# Patient Record
Sex: MALE | Race: BLACK OR AFRICAN AMERICAN | NOT HISPANIC OR LATINO | URBAN - METROPOLITAN AREA
[De-identification: names, ages, dates, MRNs, and addresses within clinical notes are randomized per-mention and may not be internally consistent; named-entity substitution may affect disease eponyms.]

---

## 2022-07-16 ENCOUNTER — INPATIENT (INPATIENT)
Facility: HOSPITAL | Age: 57
LOS: 22 days | Discharge: HOME | End: 2022-08-08
Attending: STUDENT IN AN ORGANIZED HEALTH CARE EDUCATION/TRAINING PROGRAM | Admitting: STUDENT IN AN ORGANIZED HEALTH CARE EDUCATION/TRAINING PROGRAM

## 2022-07-16 ENCOUNTER — TRANSCRIPTION ENCOUNTER (OUTPATIENT)
Age: 57
End: 2022-07-16

## 2022-07-16 VITALS
HEART RATE: 84 BPM | SYSTOLIC BLOOD PRESSURE: 137 MMHG | OXYGEN SATURATION: 100 % | HEIGHT: 73 IN | DIASTOLIC BLOOD PRESSURE: 74 MMHG | RESPIRATION RATE: 16 BRPM | TEMPERATURE: 98 F
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
## 2022-07-16 DIAGNOSIS — F20.0 PARANOID SCHIZOPHRENIA: ICD-10-CM

## 2022-07-16 DIAGNOSIS — Z90.89 ACQUIRED ABSENCE OF OTHER ORGANS: Chronic | ICD-10-CM

## 2022-07-16 DIAGNOSIS — Z29.9 ENCOUNTER FOR PROPHYLACTIC MEASURES, UNSPECIFIED: ICD-10-CM

## 2022-07-16 DIAGNOSIS — R74.01 ELEVATION OF LEVELS OF LIVER TRANSAMINASE LEVELS: ICD-10-CM

## 2022-07-16 DIAGNOSIS — S72.002A FRACTURE OF UNSPECIFIED PART OF NECK OF LEFT FEMUR, INITIAL ENCOUNTER FOR CLOSED FRACTURE: ICD-10-CM

## 2022-07-16 DIAGNOSIS — S72.009A FRACTURE OF UNSPECIFIED PART OF NECK OF UNSPECIFIED FEMUR, INITIAL ENCOUNTER FOR CLOSED FRACTURE: ICD-10-CM

## 2022-07-16 DIAGNOSIS — D69.6 THROMBOCYTOPENIA, UNSPECIFIED: ICD-10-CM

## 2022-07-16 LAB
ALBUMIN SERPL ELPH-MCNC: 4.1 G/DL — SIGNIFICANT CHANGE UP (ref 3.3–5)
ALP SERPL-CCNC: 47 U/L — SIGNIFICANT CHANGE UP (ref 40–120)
ALT FLD-CCNC: 64 U/L — HIGH (ref 4–41)
ANION GAP SERPL CALC-SCNC: 11 MMOL/L — SIGNIFICANT CHANGE UP (ref 7–14)
APTT BLD: 23.3 SEC — LOW (ref 27–36.3)
AST SERPL-CCNC: 112 U/L — HIGH (ref 4–40)
BILIRUB SERPL-MCNC: 1 MG/DL — SIGNIFICANT CHANGE UP (ref 0.2–1.2)
BLD GP AB SCN SERPL QL: NEGATIVE — SIGNIFICANT CHANGE UP
BUN SERPL-MCNC: 34 MG/DL — HIGH (ref 7–23)
CALCIUM SERPL-MCNC: 8.7 MG/DL — SIGNIFICANT CHANGE UP (ref 8.4–10.5)
CHLORIDE SERPL-SCNC: 104 MMOL/L — SIGNIFICANT CHANGE UP (ref 98–107)
CO2 SERPL-SCNC: 22 MMOL/L — SIGNIFICANT CHANGE UP (ref 22–31)
CREAT SERPL-MCNC: 0.93 MG/DL — SIGNIFICANT CHANGE UP (ref 0.5–1.3)
EGFR: 96 ML/MIN/1.73M2 — SIGNIFICANT CHANGE UP
FLUAV AG NPH QL: SIGNIFICANT CHANGE UP
FLUBV AG NPH QL: SIGNIFICANT CHANGE UP
GLUCOSE SERPL-MCNC: 117 MG/DL — HIGH (ref 70–99)
HCT VFR BLD CALC: 32.1 % — LOW (ref 39–50)
HGB BLD-MCNC: 10.3 G/DL — LOW (ref 13–17)
INR BLD: 1.17 RATIO — HIGH (ref 0.88–1.16)
MCHC RBC-ENTMCNC: 26.1 PG — LOW (ref 27–34)
MCHC RBC-ENTMCNC: 32.1 GM/DL — SIGNIFICANT CHANGE UP (ref 32–36)
MCV RBC AUTO: 81.3 FL — SIGNIFICANT CHANGE UP (ref 80–100)
NRBC # BLD: 0 /100 WBCS — SIGNIFICANT CHANGE UP
NRBC # FLD: 0 K/UL — SIGNIFICANT CHANGE UP
PLATELET # BLD AUTO: 105 K/UL — LOW (ref 150–400)
POTASSIUM SERPL-MCNC: 3.9 MMOL/L — SIGNIFICANT CHANGE UP (ref 3.5–5.3)
POTASSIUM SERPL-SCNC: 3.9 MMOL/L — SIGNIFICANT CHANGE UP (ref 3.5–5.3)
PROT SERPL-MCNC: 6.6 G/DL — SIGNIFICANT CHANGE UP (ref 6–8.3)
PROTHROM AB SERPL-ACNC: 13.6 SEC — HIGH (ref 10.5–13.4)
RBC # BLD: 3.95 M/UL — LOW (ref 4.2–5.8)
RBC # FLD: 14.2 % — SIGNIFICANT CHANGE UP (ref 10.3–14.5)
RH IG SCN BLD-IMP: POSITIVE — SIGNIFICANT CHANGE UP
RSV RNA NPH QL NAA+NON-PROBE: SIGNIFICANT CHANGE UP
SARS-COV-2 RNA SPEC QL NAA+PROBE: SIGNIFICANT CHANGE UP
SODIUM SERPL-SCNC: 137 MMOL/L — SIGNIFICANT CHANGE UP (ref 135–145)
WBC # BLD: 11.61 K/UL — HIGH (ref 3.8–10.5)
WBC # FLD AUTO: 11.61 K/UL — HIGH (ref 3.8–10.5)

## 2022-07-16 PROCEDURE — 71045 X-RAY EXAM CHEST 1 VIEW: CPT | Mod: 26

## 2022-07-16 PROCEDURE — 73560 X-RAY EXAM OF KNEE 1 OR 2: CPT | Mod: 26,50

## 2022-07-16 PROCEDURE — 99285 EMERGENCY DEPT VISIT HI MDM: CPT

## 2022-07-16 PROCEDURE — 99223 1ST HOSP IP/OBS HIGH 75: CPT

## 2022-07-16 PROCEDURE — 73552 X-RAY EXAM OF FEMUR 2/>: CPT | Mod: 26,50

## 2022-07-16 PROCEDURE — 73521 X-RAY EXAM HIPS BI 2 VIEWS: CPT | Mod: 26

## 2022-07-16 PROCEDURE — 99222 1ST HOSP IP/OBS MODERATE 55: CPT | Mod: 57

## 2022-07-16 DEVICE — K-WIRE STRYKER 3.2MM X 450MM: Type: IMPLANTABLE DEVICE | Site: LEFT | Status: FUNCTIONAL

## 2022-07-16 DEVICE — STRYKER TROCHANTERIC NAIL 11MM X 180MM 125 DEGREE: Type: IMPLANTABLE DEVICE | Site: LEFT | Status: FUNCTIONAL

## 2022-07-16 DEVICE — SCREW LOCK FULLY THREADED 5X37.5MM: Type: IMPLANTABLE DEVICE | Site: LEFT | Status: FUNCTIONAL

## 2022-07-16 DEVICE — SCREW LAG GAMMA 10.5X105MM: Type: IMPLANTABLE DEVICE | Site: LEFT | Status: FUNCTIONAL

## 2022-07-16 RX ORDER — MORPHINE SULFATE 50 MG/1
4 CAPSULE, EXTENDED RELEASE ORAL ONCE
Refills: 0 | Status: DISCONTINUED | OUTPATIENT
Start: 2022-07-16 | End: 2022-07-16

## 2022-07-16 RX ORDER — MORPHINE SULFATE 50 MG/1
4 CAPSULE, EXTENDED RELEASE ORAL EVERY 4 HOURS
Refills: 0 | Status: DISCONTINUED | OUTPATIENT
Start: 2022-07-16 | End: 2022-07-17

## 2022-07-16 RX ORDER — ACETAMINOPHEN 500 MG
650 TABLET ORAL EVERY 6 HOURS
Refills: 0 | Status: DISCONTINUED | OUTPATIENT
Start: 2022-07-16 | End: 2022-07-17

## 2022-07-16 RX ORDER — ACETAMINOPHEN 500 MG
975 TABLET ORAL ONCE
Refills: 0 | Status: COMPLETED | OUTPATIENT
Start: 2022-07-16 | End: 2022-07-16

## 2022-07-16 RX ORDER — OXYCODONE AND ACETAMINOPHEN 5; 325 MG/1; MG/1
1 TABLET ORAL EVERY 6 HOURS
Refills: 0 | Status: DISCONTINUED | OUTPATIENT
Start: 2022-07-16 | End: 2022-07-17

## 2022-07-16 RX ORDER — SENNA PLUS 8.6 MG/1
2 TABLET ORAL AT BEDTIME
Refills: 0 | Status: DISCONTINUED | OUTPATIENT
Start: 2022-07-16 | End: 2022-08-08

## 2022-07-16 RX ORDER — LANOLIN ALCOHOL/MO/W.PET/CERES
3 CREAM (GRAM) TOPICAL AT BEDTIME
Refills: 0 | Status: DISCONTINUED | OUTPATIENT
Start: 2022-07-16 | End: 2022-08-08

## 2022-07-16 RX ORDER — ONDANSETRON 8 MG/1
4 TABLET, FILM COATED ORAL EVERY 8 HOURS
Refills: 0 | Status: DISCONTINUED | OUTPATIENT
Start: 2022-07-16 | End: 2022-08-08

## 2022-07-16 RX ADMIN — Medication 650 MILLIGRAM(S): at 21:55

## 2022-07-16 RX ADMIN — Medication 975 MILLIGRAM(S): at 09:52

## 2022-07-16 RX ADMIN — MORPHINE SULFATE 4 MILLIGRAM(S): 50 CAPSULE, EXTENDED RELEASE ORAL at 05:11

## 2022-07-16 RX ADMIN — Medication 650 MILLIGRAM(S): at 21:27

## 2022-07-16 RX ADMIN — SENNA PLUS 2 TABLET(S): 8.6 TABLET ORAL at 23:03

## 2022-07-16 RX ADMIN — MORPHINE SULFATE 4 MILLIGRAM(S): 50 CAPSULE, EXTENDED RELEASE ORAL at 19:00

## 2022-07-16 RX ADMIN — MORPHINE SULFATE 4 MILLIGRAM(S): 50 CAPSULE, EXTENDED RELEASE ORAL at 18:40

## 2022-07-16 NOTE — BH CONSULTATION LIAISON ASSESSMENT NOTE - SUMMARY
Patient is a 55 y/o M w PSH of tonsillectomy, no prevenlant PMH, PPHx of schizophrenia, presents with acute left hip fracture. Patient has a hx of inpatient psychiatric admissions, as per chart review patient with multiple inpatient admission, patient has 2 admission to [facility] in 2008 and 2015. It is noted that patient has a hx of being on haldol 5mg TID and cogentin, however currently on no medications. Patient has no known SA, no hx of violence noted in chart review, no reported substance abuse. Patient not in current psychiatric treatment. Psychiatry called for capacity as patient is refusing recommended surgical repair of hip fracture.     PLAN  - at this time patient lacks capacity to refuse surgical intervention. would recommend family involvement in decision making.   - patient refusing standing medication  -- currently calm, behavior in control, chronic delusions  - antipsychotics can only be given if qtc < 500   - PRN for agitation, haldol 5mg q6hrs if qtc < 500  - no SI or HI. defer observational status to primary team - determine physical abilities and if at risk for elopement

## 2022-07-16 NOTE — PATIENT PROFILE ADULT - FALL HARM RISK - HARM RISK INTERVENTIONS

## 2022-07-16 NOTE — CONSULT NOTE ADULT - SUBJECTIVE AND OBJECTIVE BOX
Skip Golden MD  , Orthopaedic Spine Surgery  [facility]  Mercy Saucedo PA-C  Office: [phone]  Fax: [phone]    Spinal Fusion   Preoperative, Postoperative and Home Recovery Instructions   Introduction  The purpose of this guide is to provide you and your family with information regarding your medical condition and planned surgery. This information is part of your medical “Informed Consent”. Please read it  and follow the advice carefully. You should retain the guide for future reference and bring it with you to office appointments and to the hospital for reference.  Family Waiting  After surgery, Dr. Golden will meet with your family in the surgical waiting room unless otherwise arranged. Please have a representative available in the waiting room to gather the family upon completion of surgery.  After Your Operation  Pain  After surgery you will experience pain in the region of the incision. Some leg pain as well as tingling or numbness may also be present. Initially it may be of greater intensity than pre-operatively but will subside over time as the healing process occurs. This discomfort is caused from surgical retraction of tissue as well as inflammation and swelling of the previously compressed nerves. This may occur for several weeks and is NORMAL. Persistent pain should be reported to your physician.  Some patients experience a sore throat and swallowing difficulty after general anesthesia and surgery. This is from manipulation of tissue and the presence of the breathing tube for anesthesia. The sore throat usually will subside within a week. The swallowing difficulty usually takes longer. Using throat lozenges or lemon drops, sipping cool liquids, or sucking ice chips may soothe this pain.  Use of Pain Medication  Narcotic pain medication will be available for pain relief after surgery. Narcotics are very effective for pain relief  but may cause other side effects. The possible effects vary among patients and may include: sleepiness, nausea, constipation, flushing, sweating, and occasionally euphoria or confused feelings are possible. If these occur notify your nurse.   You may have a Patient Controlled Analgesia (PCA) pump. This is preferred by some but not all patients. When you depress a switch the pump will deliver narcotic pain medication directly into your IV without requiring repeated intramuscular injections. The PCA pump is closely monitored by the nursing and  anesthesiology staff. If you feel you may benefit from this method of narcotic medication delivery, ask your nurse for a PCA pump trial.  Activity  Feel free to move about in your bed. IF YOU ARE GIVEN A BRACE, then the head of your bed is elevated above 60 degrees, or you are out of bed, your TLSO brace must be on. The nurse or therapist will assist you in getting out of bed a few hours after surgery. You will be instructed to be up walking every 2 to 3 hours during the day and evening. The nurse will allow you to do this independently once you are steady and feel comfortable.  Early activity after surgery is extremely important to help prevent the complications of prolonged bed rest such as pneumonia and blood clots. It also promotes recovery, relieves muscle stiffness, allows for development of a well-organized scar, and improves your outlook.  Do not start any programs of exercise or physical therapy unless discussed with your doctor.  Diet  Your diet will begin with clear liquids, and be advanced to your normal daily diet as soon as your condition permits. Your IV will be removed as soon as we are reasonably certain it will no longer be required for medications and hydration.  Bowel and Bladder Function  During surgery you may have a catheter (tube) in your bladder to monitor your urine output. Upon its removal you may feel a stinging sensation for 2 to 3 days, which is  normal. Some patients may have difficulty urinating after surgery. If this occurs, notify your nurse who may assist you in voiding  techniques. This may require placing a catheter in your bladder.  After surgery, constipation frequently occurs from inactivity and the side effects of pain medication. Stool softeners and laxatives will be available from your nurse.  Respiratory Hygiene  Deep breathing is very important after surgery to maintain lung expansion and reduce the risk of pneumonia. You will be provided with an incentive spirometer and instructed about its use. This device should be used every 15 to 30 minutes during your wakeful hours initially, then every 1 to 2 hours as your activity returns to normal. This device is yours to take home. Continue to use it at home for at least 1 week after your discharge. (Use it during TV commercial breaks).  Smoking is absolutely forbidden. There is clear evidence that smoking dramatically increases your risk of post-operative complications. There is also evidence that smoking adversely effects bone healing and nerve recovery. Second hand smoke also applies.  Use of Your Brace  A brace may be provided prior to surgery. It serves as a reminder and keeps your back supported. It also reduces discomfort and facilitates healing. The brace should fit snugly, yet comfortably. It should allow only minimal motion. Do not “fight” the brace; cooperate with it. This will assist in bone healing and minimize discomfort and skin irritation.  Home Recovery  Follow-Up Appointment  Patients are generally discharged from the hospital 1 to 2 days after surgery. A follow-up appointment was made for Dr Golden's office 2 weeks from the date of surgery. At your first follow-up visit, you will be evaluated and the incision will be checked. You will then be seen at 6 weeks, 3 months, 6 months, 1 year and 2 years from surgery. X-rays will be taken at each visit to insure appropriate healing is  taking place.   Incision Care and Hygiene  After the 3rd post-operative day, you are encouraged to shower daily. Do not soak your incision. Pat the incision dry. Do not apply any ointments or creams. Cover daily, for the first 5 days, with a clean dressing. A supply will be provided upon discharge. Surgical tapes or Steri-strips may be present to aid in holding the skin edges together. Allow these to fall off on their own. After five days post-operatively you no longer need to wear a bandage. NO BATHS, HOT TUBS, OR POOLS FOR 6 WEEKS AFTER SURGERY, it will increase your risk of infection.   Inflammation  Please take your temperature every afternoon for the first week after you are discharged from the hospital. Call your physician if:  1. Your temperature, taken by thermometer, is more than 101.5 degrees,  OR  2. Your incision becomes reddened, swollen or any increase or change in drainage occurs.  Nutrition  A well-balanced diet is necessary for good healing and recovery. This includes food from the four basic food groups: dairy products, meat, vegetables and fruit. Use of narcotic pain medication and prolonged rest may cause constipation. Drinking plenty of fluids and eating high fiber foods (whole grains, raw fruits and vegetables) will help regain normal bowel function.  Home Pain and Medication  When we surgically relieve pressure from an inflamed, damaged nerve it does not recover instantaneously. The surgical procedure does not heal the nerve, only the body is capable of that. The goal of surgery is to create the best possible environment for the body to heal itself and to prevent further damage. This will take a variable length of time depending on the duration and degree of nerve damage and the body’s own healing abilities. Most of the healing occurs in the first few months. Pain, weakness, or numbness lasting more than six months will likely be permanent.  Everyone has a different pain tolerance that will  56yMale c/o L hip pain s/p mechanical fall 2 days ago while gardening. Patient denies head hit or LOC. Patient denies numbness or tingling in the LLE. Has a superficial abrasion over right knee. Patient denies any other injuries. Reports he wanted to stay home but called EMS due to the pain and only wanted them to provide pain meds, he did not want to come to the hospital. He is a community ambulator and denies any medical history but has a documented medical history of schizophrenia and bipolar disorder. Can only provide the phone number of his mother [phone] but there is no answer on repeated calls and messages left.     ROS: 10 point review of systems otherwise negative unless noted in HPI    PMH:  Paranoid schizophrenia    Bipolar 1 disorder      PSH:  No significant past surgical history      AH:    Meds: See med rec    T(C): 37 (07-16-22 @ 06:17)  HR: 83 (07-16-22 @ 06:17)  BP: 131/66 (07-16-22 @ 06:17)  RR: 16 (07-16-22 @ 06:17)  SpO2: 100% (07-16-22 @ 06:17)  Wt(kg): --                        10.3   11.61 )-----------( 105      ( 16 Jul 2022 05:15 )             32.1     07-16    137  |  104  |  34<H>  ----------------------------<  117<H>  3.9   |  22  |  0.93    Ca    8.7      16 Jul 2022 05:15    TPro  6.6  /  Alb  4.1  /  TBili  1.0  /  DBili  x   /  AST  112<H>  /  ALT  64<H>  /  AlkPhos  47  07-16    PT/INR - ( 16 Jul 2022 05:15 )   PT: 13.6 sec;   INR: 1.17 ratio         PTT - ( 16 Jul 2022 05:15 )  PTT:23.3 sec      PE  Gen: NAD, alert and oriented  Resp: Unlabored breathing  LLE: Skin intact, no ecchymosis,        SILT DP/SP/ Inna/Saph,        +EHL/FHL/TA/Gastroc,        Knee/ankle painless ROM,        hip ROM limited 2/2 pain,       DP+,        soft compartments, no calf ttp,        +log roll.      Secondary:  No TTP over bony landmarks, SILT BL, ROM intact BL, distal pulses palpable.  + superficial abrasion over right knee, no erythema, no discharge, no pain with ROM      Imaging:  XR demonstrating L hip IT fracture dictate the amount of pain medication required. A decreased dose and less frequent use of pain medication will occur during your recovery period. A gradual weaning of medications should begin as soon as possible, generally within 2 to 4 weeks. Conservative use of narcotic pain medication is advised. While using narcotic pain medication you SHOULD NOT drive. One should try non-narcotic medication, such as Tylenol and reserve narcotics for only the difficult times. Do not take anti-inflammatory medicines such as Celebrex, Ibuprofen, Meloxicam, Advil, Aleve, or Motrin, as these may affect your bone healing for 12 weeks following your surgery.   Narcotics will NOT be considered for refills at night or over the weekend, or holiday.  Home Activity  A well-balanced diet is necessary for good healing and recovery. This includes food from the four basic food groups: diary products, meat, vegetables and fruit.  The First Week  Early to bed, late to rise and frequent rest periods throughout the day. Get at least 8 hours of sleep each night. A disrupted sleep pattern is common after discharge from the hospital and will return to normal over time.   You may not drive, but you may be driven, for short distances, using proper restraints such as shoulder and lap belts FOR 4 WEEKS   No lifting of more than 10 pounds   May climb stairs with hand rail   Avoid sitting for longer than 20 minutes at a time   Begin a daily walking program with 1 to 2 blocks initially; schedule a daily time and increase distance daily.   Eat a regular, balanced diet.   Take medications as prescribed, using narcotics as needed. Avoid using NSAIDs such as Motrin or Advil. FOR 12 WEEKS.   The Second Week  Resume normal rising and retiring schedule, but continue to rest throughout the day.   Wear your brace as instructed.   You may not drive.   No lifting of anything weighing more than 10 pounds.   May climb stairs with hand rail   Continue scheduled walking,  increasing distance and frequency as able.   May resume sexual relations when comfortable.   Begin narcotic weaning as pain diminishes, relying mainly on non-narcotic medications  The Third Week  Resume normal rising and retiring schedule, resting as needed.   May resume light work around the home; lifting not to exceed 10 pounds.   Continue scheduled walking.   Wear your brace as instructed.  The Fourth Week  Resume normal rising and retiring schedule, resting as needed.   May resume light work around the home; lifting not to exceed 10 pounds.   Continue scheduled walking.   Wear your brace as instructed.   Disability  Some patients may return to work sooner than others depending on their job, response to surgery, and ability to perform other lighter tasks in the work place. Physician approval is required  prior to returning to work.  If your employer requires documentation of your work status, our office will provide the necessary information to your employer or other concerned parties. All disability matters may be handled by contacting our office.

## 2022-07-16 NOTE — BH CONSULTATION LIAISON ASSESSMENT NOTE - NSBHCHARTREVIEWLAB_PSY_A_CORE FT
10.3   11.61 )-----------( 105      ( 16 Jul 2022 05:15 )             32.1   07-16    137  |  104  |  34<H>  ----------------------------<  117<H>  3.9   |  22  |  0.93    Ca    8.7      16 Jul 2022 05:15    TPro  6.6  /  Alb  4.1  /  TBili  1.0  /  DBili  x   /  AST  112<H>  /  ALT  64<H>  /  AlkPhos  47  07-16

## 2022-07-16 NOTE — H&P ADULT - ASSESSMENT
55 y/o M w PMH of paranoid schizophrenia (with multiple hospitalizations) presents with acute left hip fracture. Pt refusing surgical intervention but appears to have poor insight into condition. Admitted to medicine for further monitoring

## 2022-07-16 NOTE — BH CONSULTATION LIAISON ASSESSMENT NOTE - ATTENDING COMMENTS
Chart reviewed. Pt seen virtually with ACP. On exam he is pleasant, oriented, and cooperative. However grossly psychotic, delusional, thought disordered. Has poor insight into context and lacks capacity to refuse care at this time due to uncontrolled psychosis. Agree w/ above assessment/recs. Will continue to follow.

## 2022-07-16 NOTE — CONSULT NOTE ADULT - ATTENDING COMMENTS
Patient deemed unable to make medical decisions by psychiatry.  Mother has agreed to surgery and signed consent via phone and witness.   For L hip IM nail tomorrow.  Patient seen and discussed with resident.  Films reviewed.  Agree with assessment and plan.
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
## 2022-07-16 NOTE — BH CONSULTATION LIAISON ASSESSMENT NOTE - RISK ASSESSMENT
Risk: male gender; hx of med noncompliance;   Protective factors: no history of suicide attempts; no substance use hx; lives in supervised setting (mother)

## 2022-07-16 NOTE — ED PROVIDER NOTE - ATTENDING CONTRIBUTION TO CARE
agree with resident note    "55 y/o M w PMH of schizophrenia presents with leg pain since Wednesday. Pt was gardening on Wednesday and fell while pulling on a vine.  He fell on his left side and has since been unable to ambulate on left leg. In the same accident, pt also scraped his knee. The pain continued to progress, so he called EMS. In the fall, pt did not hit his head."    PE: well appearing; in no distress; VSS: CTAB/L; s1 s2 no m/r/g abd soft/NT/ND pelvis: stable ext: left leg shortened and externally rotated and pt endorses pain prox femur/hip    Imp: hip/prox femur fracture per exam; pre op labs, xray, ortho consult    ortho saw pt and pt is refusing surgery; attempted to reach mother but could not; pt is not demonstrating capacity in terms of insight in not getting surgery; will sign out needs to be admitted either to medicine to reach family/psych involvement or ortho if he agrees to surgery

## 2022-07-16 NOTE — H&P ADULT - PROBLEM SELECTOR PLAN 3
-as per history, likely mechanical fall  -denies preceding symptoms  -EKG wnl  -PT eval once acute ortho issues intervened on

## 2022-07-16 NOTE — ED ADULT NURSE NOTE - OBJECTIVE STATEMENT
57 y/o male presents to ED with c/o LUE hip s/p fall 2 days ago. Pt was doing yard work prior to falling. He denies being on blood thinners, head strike and LOC. He presents to ED with his LLE externally rotated and swelling noted to his left knee. Skin tear to the right knee. Pt reports 9/10 left hip pain and numbness to the left hip. + LLE sensation, + left pedal pulse, skin warm, dry and normal color. Pt denies fever, chills, SOB, HA, dizziness, lightheadedness. He is A&OX3, spontaneous respirations and equal chest rise. VSS on RA. Bed locked and in lowest position, side rails up and call bell within reach.

## 2022-07-16 NOTE — H&P ADULT - NSHPREVIEWOFSYSTEMS_GEN_ALL_CORE
CONSTITUTIONAL:  No weight loss, fever, chills, weakness or fatigue.  HEENT:  Eyes:  No visual loss, blurred vision, double vision or yellow sclerae. Ears, Nose, Throat:  No hearing loss, sneezing, congestion, runny nose or sore throat.  SKIN:  No rash or itching.  CARDIOVASCULAR:  No chest pain, chest pressure or chest discomfort. No palpitations or edema.  RESPIRATORY:  No shortness of breath, cough or sputum.  GASTROINTESTINAL:  No anorexia, nausea, vomiting or diarrhea. No abdominal pain or blood.  GENITOURINARY:  Denies hematuria, dysuria.   NEUROLOGICAL:  No headache, dizziness, syncope, paralysis, ataxia, numbness or tingling in the extremities. No change in bowel or bladder control.  MUSCULOSKELETAL: +hip pain No muscle, back pain.  PSYCHIATRIC:  No history of depression or anxiety.  ENDOCRINOLOGIC:  No reports of sweating, cold or heat intolerance. No polyuria or polydipsia.  ALLERGIES:  No history of asthma, hives, eczema or rhinitis.

## 2022-07-16 NOTE — H&P ADULT - NSHPPHYSICALEXAM_GEN_ALL_CORE
GENERAL APPEARANCE: Well developed, well nourished, alert and cooperative. NAD.   HEENT:  PERRL, EOMI. External auditory canals normal, hearing grossly intact.  NECK: Neck supple, non-tender without lymphadenopathy, masses or thyromegaly.  CARDIAC: Normal S1 and S2. No S3, S4 or murmurs. Rhythm is regular.  LUNGS: Clear to auscultation and percussion without rales, rhonchi, wheezing or diminished breath sounds.  ABDOMEN: Positive bowel sounds. Soft, nondistended, nontender. No guarding or rebound.   MUSCULOSKELETAL: Limited ROM of left hip, pain on palpation   EXTREMITIES: Left leg shortened and externally rotated. No edema. Peripheral pulses intact. No varicosities.  NEUROLOGICAL: CN II-XII intact. Strength and sensation symmetric and intact throughout.   SKIN: abrasion on right knee with ace wrap   PSYCHIATRIC: AOx3. Oddly related. Circumferential and tangential.
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
## 2022-07-16 NOTE — ED PROVIDER NOTE - CLINICAL SUMMARY MEDICAL DECISION MAKING FREE TEXT BOX
57 y/o M w PMH of schizophrenia presents with inability to bear weight on left leg since fall Wednesday. Physical exam findings of shortened externally rotated leg suggest hip fracture. Xrays confirm fracture. Orthopedics consulted. Pain management, pre-op labs, COVID Swab. Pt will need admission

## 2022-07-16 NOTE — BH CONSULTATION LIAISON ASSESSMENT NOTE - CURRENT MEDICATION
MEDICATIONS  (STANDING):  senna 2 Tablet(s) Oral at bedtime    MEDICATIONS  (PRN):  acetaminophen     Tablet .. 650 milliGRAM(s) Oral every 6 hours PRN Temp greater or equal to 38C (100.4F), Mild Pain (1 - 3)  aluminum hydroxide/magnesium hydroxide/simethicone Suspension 30 milliLiter(s) Oral every 4 hours PRN Dyspepsia  melatonin 3 milliGRAM(s) Oral at bedtime PRN Insomnia  morphine  - Injectable 4 milliGRAM(s) IV Push every 4 hours PRN Severe Pain (7 - 10)  ondansetron Injectable 4 milliGRAM(s) IV Push every 8 hours PRN Nausea and/or Vomiting  oxycodone    5 mG/acetaminophen 325 mG 1 Tablet(s) Oral every 6 hours PRN Moderate Pain (4 - 6)

## 2022-07-16 NOTE — ED ADULT TRIAGE NOTE - CHIEF COMPLAINT QUOTE
Pt c/o left knee and left upper leg pain since wed. pt states he fell wed while working in the back yard. pt left knee noted to be very swollen. Pt was found  by ems on the floor where he was laying for approx 6 hrs. Pt right knee noted to scrapped. Pt denies hitting his head, LOC or any blood thinner use. No complaints of chest pain, headache, nausea, dizziness, vomiting  SOB, fever, chills verbalized..

## 2022-07-16 NOTE — ED PROVIDER NOTE - PHYSICAL EXAMINATION
Vital Signs Last 24 Hrs  T(C): 36.8 (16 Jul 2022 02:21), Max: 36.8 (16 Jul 2022 02:21)  T(F): 98.2 (16 Jul 2022 02:21), Max: 98.2 (16 Jul 2022 02:21)  HR: 84 (16 Jul 2022 02:21) (84 - 84)  BP: 137/74 (16 Jul 2022 02:21) (137/74 - 137/74)  BP(mean): --  RR: 16 (16 Jul 2022 02:21) (16 - 16)  SpO2: 100% (16 Jul 2022 02:21) (100% - 100%)    Parameters below as of 16 Jul 2022 02:21  Patient On (Oxygen Delivery Method): room air    General: Pt in mild distress due to pain, looks appropriate to age  Cards: S1, S2, no M/R/G  Resp: CTA b/l  Abd: soft, non-tender, non-distended, no masses  Ext: Right knee with abrasion, dry, no purulence. Otherwise pulses present, no edema, no cyanosis.   MSK: Left hip externally rotated, significant tendernes to palpation over the lateral thigh. Unable to evaluate strength/ROM due to pain  Neuro: AOx3, no focal neurological deficits   Psych: good mood, appropriate affect

## 2022-07-16 NOTE — H&P ADULT - NSICDXFAMILYHX_GEN_ALL_CORE_FT
FAMILY HISTORY:  Mother  Still living? Unknown  FH: deafness or hearing loss, Age at diagnosis: Age Unknown

## 2022-07-16 NOTE — ED ADULT NURSE REASSESSMENT NOTE - NS ED NURSE REASSESS COMMENT FT1
Received report from night shift RN Yenifer. Pt A&OX4, resting comfortably in stretcher. Ortho at bedside. Pt refusing surgery at this time. Verbalizes understanding of risks/benefits. Appears comfortable. R knee wrapped by ortho. Awaiting dispo.

## 2022-07-16 NOTE — ED PROVIDER NOTE - NS ED ROS FT
General: No fever, chills, weight loss  Cards: No CP, palpitations  Resp: No cough, SOB, CORONADO  GI: No N/V/C/D, abdominal pain, melena, hematochezia, hematemesis, melena  Ext: + left hip pain, + right knee abrasion, no swelling  : no dysuria, hematuria   Neuro: A&O x3, no focal neurological deficits   Psych: no depression, anxiety

## 2022-07-16 NOTE — H&P ADULT - PROBLEM SELECTOR PLAN 2
-appears decompensated. Not on medications  -currently calm and re-directable  -f/u BH recs  -haldol prn for acute agitation

## 2022-07-16 NOTE — H&P ADULT - PROBLEM SELECTOR PLAN 5
-no prior labs for comparison  -alcohol use? Pt denies but poor historian  -check acute viral panel  -if persistently elevated, check abdominal US to evaluate

## 2022-07-16 NOTE — H&P ADULT - NSHPLABSRESULTS_GEN_ALL_CORE
(07-16 @ 05:15)                      10.3  11.61 )-----------( 105                 32.1    Neutrophils = -- (--%)  Lymphocytes = -- (--%)  Eosinophils = -- (--%)  Basophils = -- (--%)  Monocytes = -- (--%)  Bands = --%    07-16    137  |  104  |  34<H>  ----------------------------<  117<H>  3.9   |  22  |  0.93    Ca    8.7      16 Jul 2022 05:15    TPro  6.6  /  Alb  4.1  /  TBili  1.0  /  DBili  x   /  AST  112<H>  /  ALT  64<H>  /  AlkPhos  47  07-16    ( 16 Jul 2022 05:15 )   PT: 13.6 sec;   INR: 1.17 ratio;       PTT:23.3 sec    < from: Xray Chest 1 View AP/PA (07.16.22 @ 05:07) >    IMPRESSION:    No focal consolidation.    < end of copied text >    < from: Xray Femur 2 Views, Bilat (07.16.22 @ 05:07) >      IMPRESSION:  Acute comminuted intertrochanteric fracture of the LEFT proximal femur.    < end of copied text >        Labs and imaging reviewed  EKG: NSR, no acute ST changes. QTc 438

## 2022-07-16 NOTE — ED ADULT NURSE NOTE - NSIMPLEMENTINTERV_GEN_ALL_ED
Implemented All Fall Risk Interventions:  [place] to call system. Call bell, personal items and telephone within reach. Instruct patient to call for assistance. Room bathroom lighting operational. Non-slip footwear when patient is off stretcher. Physically safe environment: no spills, clutter or unnecessary equipment. Stretcher in lowest position, wheels locked, appropriate side rails in place. Provide visual cue, wrist band, yellow gown, etc. Monitor gait and stability. Monitor for mental status changes and reorient to person, place, and time. Review medications for side effects contributing to fall risk. Reinforce activity limits and safety measures with patient and family.

## 2022-07-16 NOTE — BH CONSULTATION LIAISON ASSESSMENT NOTE - NSBHMSEBEHAV_PSY_A_CORE
MANTOUX TUBERCULIN (a.k.a. TB) SKIN TEST      What is Tuberculosis/TB?  Tuberculosis/TB is an infectious disease, which is spread through the air by tiny germs when people cough, sneeze, speak or sing. TB germs are very small and can remain in the air for a long period of time. TB germs most oft  en settle in your lungs, but may also settle in other organs of your body. TB germs can be present in your body without making you ill. This is called TB INFECTION. TB infection cannot be spread to other people. When your  body’s defenses become weak and can no longer control the TB germs they multiply, this is called TB DISEASE. It can take month(s) to year(s) for TB infection to become TB disease. The TB SKIN TEST can show if a person has  been “infected” by TB germs.    How is the Mantoux Tuberculin/TB skin test given?  A very small amount of a product called Purified Protein Derivative (PPD) is injected just under the top layer of the skin on the forearm. Persons who have been infected by the TB germs usually react to the test by developing swelling at the injection site. The skin test results must be read 48 to 72 hours after given. Failure to return within this time frame means the test will need to be repeated.    Side effects…  Side effects from a skin test are rare and may include itching and discomfort at the injection site and very rarely the possibility of a blister, ulceration or necrosis (dead tissue) at the site if the injection.  
Cooperative

## 2022-07-16 NOTE — H&P ADULT - HISTORY OF PRESENT ILLNESS
57 y/o M w PMH of paranoid schizophrenia (with multiple hospitalizations) presents with leg pain since Wednesday. Pt was gardening on Wednesday and fell while pulling on a vine.  He fell on his left side and has since been unable to ambulate on left leg. In the same accident, pt also scraped his right knee. The pain continued to progress, so he called EMS. In the fall, pt did not hit his head. Denies LOC. Prior to incident, he was able to climb stairs and perform ADLs without chest pain or SOB. Denies cardiac history. Denies A/V hallucinations. Denies SI/HI.   Extensive discussion with pt regarding hip fracture seen on xray and need for emergent surgery. He did not believe he had a fracture and instead stated "It is a tendon strain, I’ve had this before. I’ll wait two weeks and get a second opinion before pursing surgery." He was not able to repeat back to me the risks involved with delaying surgery. On exam he was circumferential and tangential, and with possibly paranoid thinking. When asked if I could reach out to his mother (next of kin), he gave me permission but stated "I’m in an active case against her for all she has done to me." He asked I reach out to his step uncle Brett Wadsworth but was unable to provide a number to get in contact with him. When asked about schizophrenia diagnosis, he states it was a misdiagnosis and he is not taking medications at the time. I attempted to reach out to his mother Tiffanie without response. As per prior documentation, she is hard of hearing and only able to communicate via email/text.

## 2022-07-16 NOTE — CHART NOTE - NSCHARTNOTEFT_GEN_A_CORE
Patient seen by psychiatry. Deemed to no have capacity to make decision about surgical fixation of his fracture. Patient's mother Tiffanie Lazo was consented over the phone for surgery. She is hard of hearing to the point that she cannot hear her home phone ring and cannot hear her own cell phone if it is called. She is currently at home alone right now. In order to reach her, she needs to be texted on her cell phone at [phone] with a direct call back number. She will then be able to call back on the special hearing impaired phone that she has at home.  Patient is added on as the first case for tomorrow.    Updated recs:    - NPO after midnight for OR 7/17  - Going to OR for L IMN  - Pre-op labs: CBC, BMP, coags, T&S   - Non-weightbearing LLE  - Pain control

## 2022-07-16 NOTE — H&P ADULT - PROBLEM SELECTOR PLAN 1
-pt p/w acute left hip fracture. Plan for OR however pt refusing surgery. Does not appear to have insight into condition or need for treatment. Attempted to reach mother Tiffanie (next of kin) at multiple numbers and email, no response as of yet  - consulted for assessment of insight/capacity  -in regards to medical clearance, pt with METs at least >4. Denies chest pain or resp symptoms. No cardiac history. EKG non ischemic. RCRI score 0. Pt medically optimized for mod risk procedure  -will keep pt NPO for now pending further collateral/discussion with mother   -pain control  -bowel regimen  -dvt ppx if no plans for surgery

## 2022-07-16 NOTE — ED PROVIDER NOTE - OBJECTIVE STATEMENT
55 y/o M w PMH of schizophrenia presents with leg pain since Wednesday. Pt was gardening on Wednesday and fell while pulling on a vine.  He fell on his left side and has since been unable to ambulate on left leg. In the same accident, pt also scraped his knee. The pain continued to progress, so he called EMS. In the fall, pt did not hit his head.

## 2022-07-16 NOTE — BH CONSULTATION LIAISON ASSESSMENT NOTE - NSBHCHARTREVIEWVS_PSY_A_CORE FT
Vital Signs Last 24 Hrs  T(C): 37.6 (16 Jul 2022 10:11), Max: 37.6 (16 Jul 2022 10:11)  T(F): 99.6 (16 Jul 2022 10:11), Max: 99.6 (16 Jul 2022 10:11)  HR: 70 (16 Jul 2022 10:11) (70 - 84)  BP: 120/72 (16 Jul 2022 10:11) (120/72 - 137/74)  BP(mean): --  RR: 16 (16 Jul 2022 10:11) (16 - 16)  SpO2: 100% (16 Jul 2022 10:11) (100% - 100%)    Parameters below as of 16 Jul 2022 10:11  Patient On (Oxygen Delivery Method): room air

## 2022-07-16 NOTE — BH CONSULTATION LIAISON ASSESSMENT NOTE - OTHER PAST PSYCHIATRIC HISTORY (INCLUDE DETAILS REGARDING ONSET, COURSE OF ILLNESS, INPATIENT/OUTPATIENT TREATMENT)
History of Schizophrenia vs schizoaffective disorder, bipolar type. Long history of treatment at [facility]. Multiple hospitalizations (last at [facility] from 3/19/15) through 3/30/15 for paranoid decompensations and failing to care for self. No prior suicide attempts. No history of violence.

## 2022-07-16 NOTE — H&P ADULT - NSHPADDITIONALINFOADULT_GEN_ALL_CORE
able to reach Tiffanie Wadsworth on the following number [phone]. She had a family friend relay messages between us. Is asking we text  [phone] with any information as she cannot hear over the phone. She is in agreement with surgery and giving consent to proceed if pt deemed to lack insight/capacity to make medical decisions. able to reach Tiffanie Wadsworth on the following number [phone]. She had a family friend relay messages between us. Is asking we text  [phone] with any information as she cannot hear over the phone. She is in agreement with surgery and gives consent to proceed if pt deemed to lack insight/capacity to make medical decisions.

## 2022-07-17 LAB
ALBUMIN SERPL ELPH-MCNC: 3.6 G/DL — SIGNIFICANT CHANGE UP (ref 3.3–5)
ALP SERPL-CCNC: 46 U/L — SIGNIFICANT CHANGE UP (ref 40–120)
ALT FLD-CCNC: 51 U/L — HIGH (ref 4–41)
ANION GAP SERPL CALC-SCNC: 14 MMOL/L — SIGNIFICANT CHANGE UP (ref 7–14)
ANION GAP SERPL CALC-SCNC: 8 MMOL/L — SIGNIFICANT CHANGE UP (ref 7–14)
APTT BLD: 22.2 SEC — LOW (ref 27–36.3)
AST SERPL-CCNC: 56 U/L — HIGH (ref 4–40)
BASOPHILS # BLD AUTO: 0.01 K/UL — SIGNIFICANT CHANGE UP (ref 0–0.2)
BASOPHILS NFR BLD AUTO: 0.1 % — SIGNIFICANT CHANGE UP (ref 0–2)
BILIRUB DIRECT SERPL-MCNC: 0.2 MG/DL — SIGNIFICANT CHANGE UP (ref 0–0.3)
BILIRUB INDIRECT FLD-MCNC: 0.4 MG/DL — SIGNIFICANT CHANGE UP (ref 0–1)
BILIRUB SERPL-MCNC: 0.6 MG/DL — SIGNIFICANT CHANGE UP (ref 0.2–1.2)
BUN SERPL-MCNC: 24 MG/DL — HIGH (ref 7–23)
BUN SERPL-MCNC: 25 MG/DL — HIGH (ref 7–23)
CALCIUM SERPL-MCNC: 8.5 MG/DL — SIGNIFICANT CHANGE UP (ref 8.4–10.5)
CALCIUM SERPL-MCNC: 8.6 MG/DL — SIGNIFICANT CHANGE UP (ref 8.4–10.5)
CHLORIDE SERPL-SCNC: 104 MMOL/L — SIGNIFICANT CHANGE UP (ref 98–107)
CHLORIDE SERPL-SCNC: 107 MMOL/L — SIGNIFICANT CHANGE UP (ref 98–107)
CO2 SERPL-SCNC: 20 MMOL/L — LOW (ref 22–31)
CO2 SERPL-SCNC: 24 MMOL/L — SIGNIFICANT CHANGE UP (ref 22–31)
CREAT SERPL-MCNC: 0.73 MG/DL — SIGNIFICANT CHANGE UP (ref 0.5–1.3)
CREAT SERPL-MCNC: 0.73 MG/DL — SIGNIFICANT CHANGE UP (ref 0.5–1.3)
EGFR: 106 ML/MIN/1.73M2 — SIGNIFICANT CHANGE UP
EGFR: 106 ML/MIN/1.73M2 — SIGNIFICANT CHANGE UP
EOSINOPHIL # BLD AUTO: 0 K/UL — SIGNIFICANT CHANGE UP (ref 0–0.5)
EOSINOPHIL NFR BLD AUTO: 0 % — SIGNIFICANT CHANGE UP (ref 0–6)
FERRITIN SERPL-MCNC: 220 NG/ML — SIGNIFICANT CHANGE UP (ref 30–400)
FOLATE SERPL-MCNC: 11.5 NG/ML — SIGNIFICANT CHANGE UP (ref 3.1–17.5)
GLUCOSE SERPL-MCNC: 123 MG/DL — HIGH (ref 70–99)
GLUCOSE SERPL-MCNC: 95 MG/DL — SIGNIFICANT CHANGE UP (ref 70–99)
HAV IGM SER-ACNC: SIGNIFICANT CHANGE UP
HBV CORE IGM SER-ACNC: SIGNIFICANT CHANGE UP
HBV SURFACE AG SER-ACNC: SIGNIFICANT CHANGE UP
HCT VFR BLD CALC: 28.5 % — LOW (ref 39–50)
HCT VFR BLD CALC: 31 % — LOW (ref 39–50)
HCV AB S/CO SERPL IA: 0.07 S/CO — SIGNIFICANT CHANGE UP (ref 0–0.99)
HCV AB SERPL-IMP: SIGNIFICANT CHANGE UP
HGB BLD-MCNC: 9.4 G/DL — LOW (ref 13–17)
HGB BLD-MCNC: 9.7 G/DL — LOW (ref 13–17)
IANC: 7.33 K/UL — SIGNIFICANT CHANGE UP (ref 1.8–7.4)
IMM GRANULOCYTES NFR BLD AUTO: 0.6 % — SIGNIFICANT CHANGE UP (ref 0–1.5)
INR BLD: 1.13 RATIO — SIGNIFICANT CHANGE UP (ref 0.88–1.16)
IRON SATN MFR SERPL: 11 % — LOW (ref 14–50)
IRON SATN MFR SERPL: 25 UG/DL — LOW (ref 45–165)
LYMPHOCYTES # BLD AUTO: 0.53 K/UL — LOW (ref 1–3.3)
LYMPHOCYTES # BLD AUTO: 6.6 % — LOW (ref 13–44)
MCHC RBC-ENTMCNC: 26.4 PG — LOW (ref 27–34)
MCHC RBC-ENTMCNC: 26.6 PG — LOW (ref 27–34)
MCHC RBC-ENTMCNC: 31.3 GM/DL — LOW (ref 32–36)
MCHC RBC-ENTMCNC: 33 GM/DL — SIGNIFICANT CHANGE UP (ref 32–36)
MCV RBC AUTO: 80.5 FL — SIGNIFICANT CHANGE UP (ref 80–100)
MCV RBC AUTO: 84.2 FL — SIGNIFICANT CHANGE UP (ref 80–100)
MONOCYTES # BLD AUTO: 0.16 K/UL — SIGNIFICANT CHANGE UP (ref 0–0.9)
MONOCYTES NFR BLD AUTO: 2 % — SIGNIFICANT CHANGE UP (ref 2–14)
NEUTROPHILS # BLD AUTO: 7.33 K/UL — SIGNIFICANT CHANGE UP (ref 1.8–7.4)
NEUTROPHILS NFR BLD AUTO: 90.7 % — HIGH (ref 43–77)
NRBC # BLD: 0 /100 WBCS — SIGNIFICANT CHANGE UP
NRBC # BLD: 0 /100 WBCS — SIGNIFICANT CHANGE UP
NRBC # FLD: 0 K/UL — SIGNIFICANT CHANGE UP
NRBC # FLD: 0 K/UL — SIGNIFICANT CHANGE UP
PLATELET # BLD AUTO: 111 K/UL — LOW (ref 150–400)
PLATELET # BLD AUTO: 113 K/UL — LOW (ref 150–400)
POTASSIUM SERPL-MCNC: 4.1 MMOL/L — SIGNIFICANT CHANGE UP (ref 3.5–5.3)
POTASSIUM SERPL-MCNC: 4.1 MMOL/L — SIGNIFICANT CHANGE UP (ref 3.5–5.3)
POTASSIUM SERPL-SCNC: 4.1 MMOL/L — SIGNIFICANT CHANGE UP (ref 3.5–5.3)
POTASSIUM SERPL-SCNC: 4.1 MMOL/L — SIGNIFICANT CHANGE UP (ref 3.5–5.3)
PROT SERPL-MCNC: 6.8 G/DL — SIGNIFICANT CHANGE UP (ref 6–8.3)
PROTHROM AB SERPL-ACNC: 13.1 SEC — SIGNIFICANT CHANGE UP (ref 10.5–13.4)
RBC # BLD: 3.54 M/UL — LOW (ref 4.2–5.8)
RBC # BLD: 3.68 M/UL — LOW (ref 4.2–5.8)
RBC # FLD: 14.3 % — SIGNIFICANT CHANGE UP (ref 10.3–14.5)
RBC # FLD: 14.4 % — SIGNIFICANT CHANGE UP (ref 10.3–14.5)
SODIUM SERPL-SCNC: 136 MMOL/L — SIGNIFICANT CHANGE UP (ref 135–145)
SODIUM SERPL-SCNC: 141 MMOL/L — SIGNIFICANT CHANGE UP (ref 135–145)
TIBC SERPL-MCNC: 222 UG/DL — SIGNIFICANT CHANGE UP (ref 220–430)
UIBC SERPL-MCNC: 197 UG/DL — SIGNIFICANT CHANGE UP (ref 110–370)
VIT B12 SERPL-MCNC: 684 PG/ML — SIGNIFICANT CHANGE UP (ref 200–900)
WBC # BLD: 8.08 K/UL — SIGNIFICANT CHANGE UP (ref 3.8–10.5)
WBC # BLD: 9.56 K/UL — SIGNIFICANT CHANGE UP (ref 3.8–10.5)
WBC # FLD AUTO: 8.08 K/UL — SIGNIFICANT CHANGE UP (ref 3.8–10.5)
WBC # FLD AUTO: 9.56 K/UL — SIGNIFICANT CHANGE UP (ref 3.8–10.5)

## 2022-07-17 PROCEDURE — 99233 SBSQ HOSP IP/OBS HIGH 50: CPT

## 2022-07-17 PROCEDURE — 99223 1ST HOSP IP/OBS HIGH 75: CPT

## 2022-07-17 PROCEDURE — 27245 TREAT THIGH FRACTURE: CPT | Mod: LT

## 2022-07-17 RX ORDER — HYDROMORPHONE HYDROCHLORIDE 2 MG/ML
1 INJECTION INTRAMUSCULAR; INTRAVENOUS; SUBCUTANEOUS
Refills: 0 | Status: DISCONTINUED | OUTPATIENT
Start: 2022-07-17 | End: 2022-07-17

## 2022-07-17 RX ORDER — CEFAZOLIN SODIUM 1 G
2000 VIAL (EA) INJECTION EVERY 8 HOURS
Refills: 0 | Status: COMPLETED | OUTPATIENT
Start: 2022-07-17 | End: 2022-07-18

## 2022-07-17 RX ORDER — POLYETHYLENE GLYCOL 3350 17 G/17G
17 POWDER, FOR SOLUTION ORAL DAILY
Refills: 0 | Status: DISCONTINUED | OUTPATIENT
Start: 2022-07-17 | End: 2022-07-20

## 2022-07-17 RX ORDER — ACETAMINOPHEN 500 MG
975 TABLET ORAL EVERY 8 HOURS
Refills: 0 | Status: DISCONTINUED | OUTPATIENT
Start: 2022-07-17 | End: 2022-08-08

## 2022-07-17 RX ORDER — SODIUM CHLORIDE 9 MG/ML
1000 INJECTION INTRAMUSCULAR; INTRAVENOUS; SUBCUTANEOUS
Refills: 0 | Status: DISCONTINUED | OUTPATIENT
Start: 2022-07-17 | End: 2022-07-18

## 2022-07-17 RX ORDER — ONDANSETRON 8 MG/1
4 TABLET, FILM COATED ORAL ONCE
Refills: 0 | Status: DISCONTINUED | OUTPATIENT
Start: 2022-07-17 | End: 2022-07-17

## 2022-07-17 RX ORDER — OXYCODONE HYDROCHLORIDE 5 MG/1
5 TABLET ORAL EVERY 4 HOURS
Refills: 0 | Status: DISCONTINUED | OUTPATIENT
Start: 2022-07-17 | End: 2022-07-23

## 2022-07-17 RX ORDER — FENTANYL CITRATE 50 UG/ML
25 INJECTION INTRAVENOUS
Refills: 0 | Status: DISCONTINUED | OUTPATIENT
Start: 2022-07-17 | End: 2022-07-17

## 2022-07-17 RX ORDER — HALOPERIDOL DECANOATE 100 MG/ML
5 INJECTION INTRAMUSCULAR ONCE
Refills: 0 | Status: COMPLETED | OUTPATIENT
Start: 2022-07-17 | End: 2023-06-15

## 2022-07-17 RX ORDER — ENOXAPARIN SODIUM 100 MG/ML
40 INJECTION SUBCUTANEOUS EVERY 24 HOURS
Refills: 0 | Status: DISCONTINUED | OUTPATIENT
Start: 2022-07-17 | End: 2022-08-08

## 2022-07-17 RX ORDER — HALOPERIDOL DECANOATE 100 MG/ML
5 INJECTION INTRAMUSCULAR ONCE
Refills: 0 | Status: COMPLETED | OUTPATIENT
Start: 2022-07-17 | End: 2022-07-17

## 2022-07-17 RX ORDER — OXYCODONE HYDROCHLORIDE 5 MG/1
10 TABLET ORAL EVERY 4 HOURS
Refills: 0 | Status: DISCONTINUED | OUTPATIENT
Start: 2022-07-17 | End: 2022-07-24

## 2022-07-17 RX ORDER — HYDROMORPHONE HYDROCHLORIDE 2 MG/ML
0.5 INJECTION INTRAMUSCULAR; INTRAVENOUS; SUBCUTANEOUS
Refills: 0 | Status: DISCONTINUED | OUTPATIENT
Start: 2022-07-17 | End: 2022-07-17

## 2022-07-17 RX ADMIN — OXYCODONE HYDROCHLORIDE 5 MILLIGRAM(S): 5 TABLET ORAL at 18:48

## 2022-07-17 RX ADMIN — Medication 975 MILLIGRAM(S): at 14:28

## 2022-07-17 RX ADMIN — Medication 650 MILLIGRAM(S): at 05:40

## 2022-07-17 RX ADMIN — ENOXAPARIN SODIUM 40 MILLIGRAM(S): 100 INJECTION SUBCUTANEOUS at 18:37

## 2022-07-17 RX ADMIN — Medication 100 MILLIGRAM(S): at 18:37

## 2022-07-17 RX ADMIN — Medication 975 MILLIGRAM(S): at 22:48

## 2022-07-17 RX ADMIN — Medication 650 MILLIGRAM(S): at 05:10

## 2022-07-17 RX ADMIN — HALOPERIDOL DECANOATE 5 MILLIGRAM(S): 100 INJECTION INTRAMUSCULAR at 08:24

## 2022-07-17 RX ADMIN — SENNA PLUS 2 TABLET(S): 8.6 TABLET ORAL at 22:48

## 2022-07-17 RX ADMIN — SODIUM CHLORIDE 75 MILLILITER(S): 9 INJECTION INTRAMUSCULAR; INTRAVENOUS; SUBCUTANEOUS at 12:20

## 2022-07-17 RX ADMIN — OXYCODONE HYDROCHLORIDE 5 MILLIGRAM(S): 5 TABLET ORAL at 20:10

## 2022-07-17 NOTE — PROGRESS NOTE ADULT - PROBLEM SELECTOR PLAN 5
-no prior labs for comparison  -alcohol use? Pt denies but poor historian, twice denied  -check acute viral panel  -if persistently elevated, check abdominal US to evaluate

## 2022-07-17 NOTE — PROGRESS NOTE ADULT - SUBJECTIVE AND OBJECTIVE BOX
Ortho Progress Note    S: Patient seen and examined. No acute events overnight. Patient still complaining of pain. Going to OR today for IMN. Received Haldol 5mg IM to sedate for OR.      O:  Physical Exam:  Gen: Laying in bed, NAD  Resp: Unlabored breathing  Ext: RLE          EHL/FHL/TA/Sol intact          + SILT DP/SP/SHULTZ/Sa/T          +DP, extremity WWP    Vital Signs Last 24 Hrs  T(C): 36.9 (17 Jul 2022 05:22), Max: 37.6 (16 Jul 2022 10:11)  T(F): 98.4 (17 Jul 2022 05:22), Max: 99.6 (16 Jul 2022 10:11)  HR: 74 (17 Jul 2022 05:22) (70 - 81)  BP: 128/75 (17 Jul 2022 05:22) (120/72 - 128/75)  BP(mean): --  RR: 18 (17 Jul 2022 05:22) (16 - 18)  SpO2: 100% (17 Jul 2022 05:22) (100% - 100%)    Parameters below as of 17 Jul 2022 05:22  Patient On (Oxygen Delivery Method): room air                              9.4    9.56  )-----------( 111      ( 17 Jul 2022 00:56 )             28.5                         10.3   11.61 )-----------( 105      ( 16 Jul 2022 05:15 )             32.1       07-17    136  |  104  |  25<H>  ----------------------------<  95  4.1   |  24  |  0.73        PT/INR - ( 17 Jul 2022 00:56 )   PT: 13.1 sec;   INR: 1.13 ratio         PTT - ( 17 Jul 2022 00:56 )  PTT:22.2 sec         Ortho Progress Note    S: Patient seen and examined. No acute events overnight. Patient still complaining of pain. Going to OR today for IMN. Received Haldol 5mg IM to sedate for OR.      O:  Physical Exam:  Gen: Laying in bed, NAD  Resp: Unlabored breathing  Ext: LLE          EHL/FHL/TA/Sol intact          + SILT DP/SP/SHULTZ/Sa/T          +DP, extremity WWP    Vital Signs Last 24 Hrs  T(C): 36.9 (17 Jul 2022 05:22), Max: 37.6 (16 Jul 2022 10:11)  T(F): 98.4 (17 Jul 2022 05:22), Max: 99.6 (16 Jul 2022 10:11)  HR: 74 (17 Jul 2022 05:22) (70 - 81)  BP: 128/75 (17 Jul 2022 05:22) (120/72 - 128/75)  BP(mean): --  RR: 18 (17 Jul 2022 05:22) (16 - 18)  SpO2: 100% (17 Jul 2022 05:22) (100% - 100%)    Parameters below as of 17 Jul 2022 05:22  Patient On (Oxygen Delivery Method): room air                              9.4    9.56  )-----------( 111      ( 17 Jul 2022 00:56 )             28.5                         10.3   11.61 )-----------( 105      ( 16 Jul 2022 05:15 )             32.1       07-17    136  |  104  |  25<H>  ----------------------------<  95  4.1   |  24  |  0.73        PT/INR - ( 17 Jul 2022 00:56 )   PT: 13.1 sec;   INR: 1.13 ratio         PTT - ( 17 Jul 2022 00:56 )  PTT:22.2 sec

## 2022-07-17 NOTE — CHART NOTE - NSCHARTNOTEFT_GEN_A_CORE
Post Operative Note  Patient: CORINNE FARMER 56y (1965) Male   MRN: [id number]  Location: [facility]  Visit: 07-16-22 Inpatient  Date: 07-17-22 @ 13:53    Procedure: S/P L femur IMN    Subjective:   Doing well, pain controlled, tolerating PO.     Objective:  Vitals: T(F): 97.7 (07-17-22 @ 13:00), Max: 98.6 (07-16-22 @ 17:32)  HR: 88 (07-17-22 @ 13:30)  BP: 132/73 (07-17-22 @ 13:30) (121/70 - 142/80)  RR: 13 (07-17-22 @ 13:30)  SpO2: 100% (07-17-22 @ 13:30)  Vent Settings:     In:   07-16-22 @ 07:01  -  07-17-22 @ 07:00  --------------------------------------------------------  IN: 0 mL    07-17-22 @ 07:01  -  07-17-22 @ 13:53  --------------------------------------------------------  IN: 0 mL      IV Fluids: sodium chloride 0.9%. 1000 milliLiter(s) (75 mL/Hr) IV Continuous <Continuous>      Out:   07-16-22 @ 07:01  -  07-17-22 @ 07:00  --------------------------------------------------------  OUT: 400 mL    07-17-22 @ 07:01  -  07-17-22 @ 13:53  --------------------------------------------------------  OUT: 500 mL      EBL:     Voided Urine:   07-16-22 @ 07:01  -  07-17-22 @ 07:00  --------------------------------------------------------  OUT: 400 mL    07-17-22 @ 07:01  -  07-17-22 @ 13:53  --------------------------------------------------------  OUT: 500 mL      Brewer Catheter: no   Drains: no      Physical Examination:  General: NAD, resting comfortably in bed  Respiratory: Nonlabored respirations, normal CW expansion.  LLE:  dressings c/d/i  motor intact TA/GSC/EHL  SILT s/s/sp/dp/t  WWP, 2+ DP      Imaging:  No post-op imaging studies    Assessment:  56yMale patient S/P L femur IMN for L IT fx    Plan:  - IV Abx: Ancef periop  - Pain control  - Labs: CBC in RR and AM  - Activity: WBAT, PT/OT/OOB  - DVT ppx: Lovenox in house, dc on  BID x6wks    Date/Time: 07-17-22 @ 13:53

## 2022-07-17 NOTE — PROGRESS NOTE ADULT - PROBLEM SELECTOR PLAN 4
-mild thrombocytopenia noted  -check b12, folate, hep c  -check peripheral smear if continues to downtrend  -hold aspirin for now

## 2022-07-17 NOTE — PROGRESS NOTE ADULT - ASSESSMENT
ASSESSMENT/PLAN:   CORINNE FARMER is a 56yMale presenting with L hip IT, with plan to go to OR for IMN     - NPO for OR   - Pain control  -  NWB [id number]

## 2022-07-17 NOTE — CHART NOTE - NSCHARTNOTEFT_GEN_A_CORE
Psychiatry resident came to see patient today, but patient was in operating room for hip surgery, as planned. Psychiatry will continue to follow.

## 2022-07-17 NOTE — PROGRESS NOTE ADULT - SUBJECTIVE AND OBJECTIVE BOX
[facility] Division of Hospital Medicine  Dhara Hicks MD  Pager (DREW, 8A-5P): [phone]  Other Times:  [phone]      SUBJECTIVE / OVERNIGHT EVENTS: Pt denies pain currently. Able to recount the events of the fall- says he was "pulling some beltran" and then went down. Denies SOB, chest pain, dizziness prior to the event. Urinating without issue post op, no BM since admission. Then started to say that  "the [employer] has pills that will make you stop peeing and stop pooping...they're giving them to the [facility] news anchors." Asked pt if he was taking is psych medications and he said "No not currently."    MEDICATIONS  (STANDING):  acetaminophen     Tablet .. 975 milliGRAM(s) Oral every 8 hours  ceFAZolin   IVPB 2000 milliGRAM(s) IV Intermittent every 8 hours  enoxaparin Injectable 40 milliGRAM(s) SubCutaneous every 24 hours  polyethylene glycol 3350 17 Gram(s) Oral daily  senna 2 Tablet(s) Oral at bedtime  sodium chloride 0.9%. 1000 milliLiter(s) (75 mL/Hr) IV Continuous <Continuous>    MEDICATIONS  (PRN):  aluminum hydroxide/magnesium hydroxide/simethicone Suspension 30 milliLiter(s) Oral every 4 hours PRN Dyspepsia  fentaNYL    Injectable 25 MICROGram(s) IV Push every 5 minutes PRN Moderate Pain (4 - 6)  HYDROmorphone  Injectable 0.5 milliGRAM(s) IV Push every 10 minutes PRN Moderate Pain (4 - 6)  HYDROmorphone  Injectable 1 milliGRAM(s) IV Push every 10 minutes PRN Severe Pain (7 - 10)  melatonin 3 milliGRAM(s) Oral at bedtime PRN Insomnia  ondansetron Injectable 4 milliGRAM(s) IV Push once PRN Nausea and/or Vomiting  ondansetron Injectable 4 milliGRAM(s) IV Push every 8 hours PRN Nausea and/or Vomiting  ondansetron Injectable 4 milliGRAM(s) IV Push once PRN Nausea and/or Vomiting  oxyCODONE    IR 10 milliGRAM(s) Oral every 4 hours PRN Severe Pain (7 - 10)  oxyCODONE    IR 5 milliGRAM(s) Oral every 4 hours PRN Moderate Pain (4 - 6)      I&O's Summary    16 Jul 2022 07:01  -  17 Jul 2022 07:00  --------------------------------------------------------  IN: 0 mL / OUT: 400 mL / NET: -400 mL    17 Jul 2022 07:01  -  17 Jul 2022 17:34  --------------------------------------------------------  IN: 705 mL / OUT: 675 mL / NET: 30 mL        PHYSICAL EXAM:  Vital Signs Last 24 Hrs  T(C): 36.4 (17 Jul 2022 16:00), Max: 36.9 (17 Jul 2022 05:22)  T(F): 97.5 (17 Jul 2022 16:00), Max: 98.4 (17 Jul 2022 05:22)  HR: 73 (17 Jul 2022 16:00) (71 - 94)  BP: 133/69 (17 Jul 2022 16:00) (128/75 - 142/80)  BP(mean): 88 (17 Jul 2022 16:00) (86 - 102)  RR: 13 (17 Jul 2022 16:00) (10 - 19)  SpO2: 100% (17 Jul 2022 16:00) (96% - 100%)    Parameters below as of 17 Jul 2022 15:00  Patient On (Oxygen Delivery Method): room air      CONSTITUTIONAL: NAD, thin  EYES: PERRLA; conjunctiva and sclera clear  ENMT: Moist oral mucosa, no pharyngeal injection or exudates; normal dentition  RESPIRATORY: Normal respiratory effort; lungs are clear to auscultation bilaterally  CARDIOVASCULAR: Regular rate and rhythm, normal S1 and S2, no murmur/rub/gallop; No lower extremity edema; Peripheral pulses are 2+ bilaterally  ABDOMEN: Nontender to palpation, normoactive bowel sounds  PSYCH: A+O to person, place, and time  MSKL: DP pulses 2+ bilaterally, extremities warm, dressing in place of lateral L upper thigh s/p IMN  SKIN: No rashes; no palpable lesions    LABS:                        9.7    8.08  )-----------( 113      ( 17 Jul 2022 12:30 )             31.0     07-17    141  |  107  |  24<H>  ----------------------------<  123<H>  4.1   |  20<L>  |  0.73    Ca    8.6      17 Jul 2022 12:30    TPro  6.6  /  Alb  4.1  /  TBili  1.0  /  DBili  x   /  AST  112<H>  /  ALT  64<H>  /  AlkPhos  47  07-16    PT/INR - ( 17 Jul 2022 00:56 )   PT: 13.1 sec;   INR: 1.13 ratio         PTT - ( 17 Jul 2022 00:56 )  PTT:22.2 sec            RADIOLOGY & ADDITIONAL TESTS:  Results Reviewed:   Imaging Personally Reviewed:  Electrocardiogram Personally Reviewed:    COORDINATION OF CARE:  Care Discussed with Consultants/Other Providers [Y/N]:  Prior or Outpatient Records Reviewed [Y/N]:   [facility] Division of Hospital Medicine  Dhara Hicks MD  Pager (DREW, 8A-5P): [phone]  Other Times:  [phone]      SUBJECTIVE / OVERNIGHT EVENTS: Pt denies pain currently. Able to recount the events of the fall- says he was "pulling some beltran" and then went down. Denies SOB, chest pain, dizziness prior to the event. Urinating without issue post op, no BM since admission. Then started to say that  "the [employer] has pills that will make you stop peeing and stop pooping...they're giving them to the [employer] news anchors." Asked pt if he was taking is psych medications and he said "No not currently."    MEDICATIONS  (STANDING):  acetaminophen     Tablet .. 975 milliGRAM(s) Oral every 8 hours  ceFAZolin   IVPB 2000 milliGRAM(s) IV Intermittent every 8 hours  enoxaparin Injectable 40 milliGRAM(s) SubCutaneous every 24 hours  polyethylene glycol 3350 17 Gram(s) Oral daily  senna 2 Tablet(s) Oral at bedtime  sodium chloride 0.9%. 1000 milliLiter(s) (75 mL/Hr) IV Continuous <Continuous>    MEDICATIONS  (PRN):  aluminum hydroxide/magnesium hydroxide/simethicone Suspension 30 milliLiter(s) Oral every 4 hours PRN Dyspepsia  fentaNYL    Injectable 25 MICROGram(s) IV Push every 5 minutes PRN Moderate Pain (4 - 6)  HYDROmorphone  Injectable 0.5 milliGRAM(s) IV Push every 10 minutes PRN Moderate Pain (4 - 6)  HYDROmorphone  Injectable 1 milliGRAM(s) IV Push every 10 minutes PRN Severe Pain (7 - 10)  melatonin 3 milliGRAM(s) Oral at bedtime PRN Insomnia  ondansetron Injectable 4 milliGRAM(s) IV Push once PRN Nausea and/or Vomiting  ondansetron Injectable 4 milliGRAM(s) IV Push every 8 hours PRN Nausea and/or Vomiting  ondansetron Injectable 4 milliGRAM(s) IV Push once PRN Nausea and/or Vomiting  oxyCODONE    IR 10 milliGRAM(s) Oral every 4 hours PRN Severe Pain (7 - 10)  oxyCODONE    IR 5 milliGRAM(s) Oral every 4 hours PRN Moderate Pain (4 - 6)      I&O's Summary    16 Jul 2022 07:01  -  17 Jul 2022 07:00  --------------------------------------------------------  IN: 0 mL / OUT: 400 mL / NET: -400 mL    17 Jul 2022 07:01  -  17 Jul 2022 17:34  --------------------------------------------------------  IN: 705 mL / OUT: 675 mL / NET: 30 mL        PHYSICAL EXAM:  Vital Signs Last 24 Hrs  T(C): 36.4 (17 Jul 2022 16:00), Max: 36.9 (17 Jul 2022 05:22)  T(F): 97.5 (17 Jul 2022 16:00), Max: 98.4 (17 Jul 2022 05:22)  HR: 73 (17 Jul 2022 16:00) (71 - 94)  BP: 133/69 (17 Jul 2022 16:00) (128/75 - 142/80)  BP(mean): 88 (17 Jul 2022 16:00) (86 - 102)  RR: 13 (17 Jul 2022 16:00) (10 - 19)  SpO2: 100% (17 Jul 2022 16:00) (96% - 100%)    Parameters below as of 17 Jul 2022 15:00  Patient On (Oxygen Delivery Method): room air      CONSTITUTIONAL: NAD, thin  EYES: PERRLA; conjunctiva and sclera clear  ENMT: Moist oral mucosa, no pharyngeal injection or exudates; normal dentition  RESPIRATORY: Normal respiratory effort; lungs are clear to auscultation bilaterally  CARDIOVASCULAR: Regular rate and rhythm, normal S1 and S2, no murmur/rub/gallop; No lower extremity edema; Peripheral pulses are 2+ bilaterally  ABDOMEN: Nontender to palpation, normoactive bowel sounds  PSYCH: A+O to person, place, and time  MSKL: DP pulses 2+ bilaterally, extremities warm, dressing in place of lateral L upper thigh s/p IMN  SKIN: No rashes; no palpable lesions    LABS:                        9.7    8.08  )-----------( 113      ( 17 Jul 2022 12:30 )             31.0     07-17    141  |  107  |  24<H>  ----------------------------<  123<H>  4.1   |  20<L>  |  0.73    Ca    8.6      17 Jul 2022 12:30    TPro  6.6  /  Alb  4.1  /  TBili  1.0  /  DBili  x   /  AST  112<H>  /  ALT  64<H>  /  AlkPhos  47  07-16    PT/INR - ( 17 Jul 2022 00:56 )   PT: 13.1 sec;   INR: 1.13 ratio         PTT - ( 17 Jul 2022 00:56 )  PTT:22.2 sec            RADIOLOGY & ADDITIONAL TESTS:  Results Reviewed:   Imaging Personally Reviewed:  Electrocardiogram Personally Reviewed:    COORDINATION OF CARE:  Care Discussed with Consultants/Other Providers [Y/N]: Ortho, psych  Prior or Outpatient Records Reviewed [Y/N]:

## 2022-07-17 NOTE — PROGRESS NOTE ADULT - ASSESSMENT
57 y/o M w PMH of paranoid schizophrenia (with multiple hospitalizations) presents with acute left hip fracture, s/p IMN 7/17. Psych consult for medication mgmt of schizophrenia.

## 2022-07-18 ENCOUNTER — TRANSCRIPTION ENCOUNTER (OUTPATIENT)
Age: 57
End: 2022-07-18

## 2022-07-18 DIAGNOSIS — D72.829 ELEVATED WHITE BLOOD CELL COUNT, UNSPECIFIED: ICD-10-CM

## 2022-07-18 LAB
ANION GAP SERPL CALC-SCNC: 9 MMOL/L — SIGNIFICANT CHANGE UP (ref 7–14)
BUN SERPL-MCNC: 22 MG/DL — SIGNIFICANT CHANGE UP (ref 7–23)
CALCIUM SERPL-MCNC: 8.2 MG/DL — LOW (ref 8.4–10.5)
CHLORIDE SERPL-SCNC: 104 MMOL/L — SIGNIFICANT CHANGE UP (ref 98–107)
CO2 SERPL-SCNC: 25 MMOL/L — SIGNIFICANT CHANGE UP (ref 22–31)
CREAT SERPL-MCNC: 0.79 MG/DL — SIGNIFICANT CHANGE UP (ref 0.5–1.3)
EGFR: 104 ML/MIN/1.73M2 — SIGNIFICANT CHANGE UP
GLUCOSE SERPL-MCNC: 110 MG/DL — HIGH (ref 70–99)
HCT VFR BLD CALC: 26.1 % — LOW (ref 39–50)
HGB BLD-MCNC: 8.2 G/DL — LOW (ref 13–17)
MAGNESIUM SERPL-MCNC: 2.2 MG/DL — SIGNIFICANT CHANGE UP (ref 1.6–2.6)
MCHC RBC-ENTMCNC: 26.5 PG — LOW (ref 27–34)
MCHC RBC-ENTMCNC: 31.4 GM/DL — LOW (ref 32–36)
MCV RBC AUTO: 84.2 FL — SIGNIFICANT CHANGE UP (ref 80–100)
NRBC # BLD: 0 /100 WBCS — SIGNIFICANT CHANGE UP
NRBC # FLD: 0 K/UL — SIGNIFICANT CHANGE UP
PHOSPHATE SERPL-MCNC: 2.3 MG/DL — LOW (ref 2.5–4.5)
PLATELET # BLD AUTO: 116 K/UL — LOW (ref 150–400)
POTASSIUM SERPL-MCNC: 3.9 MMOL/L — SIGNIFICANT CHANGE UP (ref 3.5–5.3)
POTASSIUM SERPL-SCNC: 3.9 MMOL/L — SIGNIFICANT CHANGE UP (ref 3.5–5.3)
RBC # BLD: 3.1 M/UL — LOW (ref 4.2–5.8)
RBC # FLD: 14.3 % — SIGNIFICANT CHANGE UP (ref 10.3–14.5)
SODIUM SERPL-SCNC: 138 MMOL/L — SIGNIFICANT CHANGE UP (ref 135–145)
WBC # BLD: 14.04 K/UL — HIGH (ref 3.8–10.5)
WBC # FLD AUTO: 14.04 K/UL — HIGH (ref 3.8–10.5)

## 2022-07-18 PROCEDURE — 99233 SBSQ HOSP IP/OBS HIGH 50: CPT

## 2022-07-18 PROCEDURE — 99222 1ST HOSP IP/OBS MODERATE 55: CPT

## 2022-07-18 PROCEDURE — 99233 SBSQ HOSP IP/OBS HIGH 50: CPT | Mod: GC

## 2022-07-18 RX ORDER — MAGNESIUM OXIDE 400 MG ORAL TABLET 241.3 MG
400 TABLET ORAL
Refills: 0 | Status: DISCONTINUED | OUTPATIENT
Start: 2022-07-18 | End: 2022-07-18

## 2022-07-18 RX ORDER — SENNA PLUS 8.6 MG/1
2 TABLET ORAL
Qty: 0 | Refills: 0 | DISCHARGE
Start: 2022-07-18

## 2022-07-18 RX ORDER — SODIUM,POTASSIUM PHOSPHATES 278-250MG
1 POWDER IN PACKET (EA) ORAL ONCE
Refills: 0 | Status: COMPLETED | OUTPATIENT
Start: 2022-07-18 | End: 2022-07-18

## 2022-07-18 RX ORDER — ACETAMINOPHEN 500 MG
3 TABLET ORAL
Qty: 0 | Refills: 0 | DISCHARGE
Start: 2022-07-18

## 2022-07-18 RX ORDER — POLYETHYLENE GLYCOL 3350 17 G/17G
17 POWDER, FOR SOLUTION ORAL
Qty: 0 | Refills: 0 | DISCHARGE
Start: 2022-07-18

## 2022-07-18 RX ORDER — HALOPERIDOL DECANOATE 100 MG/ML
5 INJECTION INTRAMUSCULAR EVERY 6 HOURS
Refills: 0 | Status: DISCONTINUED | OUTPATIENT
Start: 2022-07-18 | End: 2022-08-03

## 2022-07-18 RX ORDER — ASPIRIN/CALCIUM CARB/MAGNESIUM 324 MG
1 TABLET ORAL
Qty: 84 | Refills: 0
Start: 2022-07-18 | End: 2022-08-28

## 2022-07-18 RX ORDER — LANOLIN ALCOHOL/MO/W.PET/CERES
1 CREAM (GRAM) TOPICAL
Qty: 0 | Refills: 0 | DISCHARGE
Start: 2022-07-18

## 2022-07-18 RX ORDER — ASPIRIN/CALCIUM CARB/MAGNESIUM 324 MG
1 TABLET ORAL
Qty: 0 | Refills: 0 | DISCHARGE

## 2022-07-18 RX ORDER — RISPERIDONE 4 MG/1
1 TABLET ORAL AT BEDTIME
Refills: 0 | Status: DISCONTINUED | OUTPATIENT
Start: 2022-07-18 | End: 2022-07-21

## 2022-07-18 RX ADMIN — Medication 975 MILLIGRAM(S): at 05:24

## 2022-07-18 RX ADMIN — ENOXAPARIN SODIUM 40 MILLIGRAM(S): 100 INJECTION SUBCUTANEOUS at 17:24

## 2022-07-18 RX ADMIN — RISPERIDONE 1 MILLIGRAM(S): 4 TABLET ORAL at 21:44

## 2022-07-18 RX ADMIN — Medication 100 MILLIGRAM(S): at 01:50

## 2022-07-18 RX ADMIN — Medication 975 MILLIGRAM(S): at 12:08

## 2022-07-18 RX ADMIN — Medication 975 MILLIGRAM(S): at 21:44

## 2022-07-18 RX ADMIN — SENNA PLUS 2 TABLET(S): 8.6 TABLET ORAL at 21:44

## 2022-07-18 RX ADMIN — Medication 1 PACKET(S): at 08:55

## 2022-07-18 RX ADMIN — POLYETHYLENE GLYCOL 3350 17 GRAM(S): 17 POWDER, FOR SOLUTION ORAL at 12:08

## 2022-07-18 NOTE — PHYSICAL THERAPY INITIAL EVALUATION ADULT - ADL SKILLS, REHAB EVAL
PHARYNGITIS (Strep Throat)    Your rapid-strep test was positive in the office.     Please take Azithromycin daily with food x 5 days.    You can take Tylenol or Motrin as needed for pain & fevers.    Drink plenty of fluids ~64 oz of water daily. Get lots of sleep.    Avoid sharing cups/plates/utensils, etc. to prevent spreading infection.    Throw out your toothbrush in 2 days after starting antibiotics, get a fresh one. If anyone in your home shares a toothbrush fraser with yours- have them throw out their toothbrush now & get a fresh one to keep separate from yours until antibiotics are finished.    Follow up with your PCP in 1 week if symptoms are not resolving.      What is pharyngitis? Pharyngitis, or sore throat, is inflammation of the tissues and structures in your child's pharynx (throat).    What causes pharyngitis?    A virus such as the cold or flu virus causes viral pharyngitis. Pharyngitis is common in adolescents who have an illness called infectious mononucleosis (mono). Mono is caused by the Radha-Barr virus.     Bacteria cause bacterial pharyngitis. The most common type of bacteria that causes pharyngitis is group A streptococcus (strep throat).  How is pharyngitis spread to other people? Pharyngitis can spread when an infected person coughs or sneezes. Pharyngitis can also be spread if the person shares food and drinks. A carrier can also spread pharyngitis. A carrier is a person who has the bacteria in his or her throat but does not have symptoms. Germs are easily spread in schools,  centers, work, and at home.    CALL YOUR PRIMARY CARE PROVIDER IF:  new symptoms occur.  the fever comes back after being normal for 2 days.  the fever has not gone away in 2 days.  your sore throat is not better in 2 days.  you have severe difficulty swallowing and is drooling.  your are short of breath.  your child\"s drooling is getting worse.    Eat only soft foods.  Gargle with warm salt water for 5  minutes every 4 hours.  Gargle with warm water for 5 minutes every 4 hour.  Good had washing is one of the best ways to control the spread of germs.  Make sure you drink plenty of fluids    SEEK IMMEDIATE MEDICAL ATTENTION IF:  you have painful joints.  you  shows signs of dehydration.  your dehydration is getting worse.  your swallowing difficulty is worse.    Take steps to prevent passing germs to others.    Please take the antibiotic as prescribed.  Do NOT stop taking the antibiotic early even if you feel better unless it makes you sick.  If the antibiotic makes you sick, call your primary care provider for a different antibiotic.  Take ibuprofen or acetaminophen for pain and/or fever.  Discard your  tooth brush in 3 days and replace with a new one.  Avoid contact with others until your child has been on an antibiotic for at least 24 hours.    Please follow up with your primary care provider.    Patient Education     Coronavirus Disease 2019 (COVID-19): Overview  Coronavirus disease 2019 (COVID-19) is a respiratory illness. It's caused by a new (novel) coronavirus. There are many types of coronavirus. Coronaviruses are a very common cause of colds and bronchitis. They may sometimes cause lung infection (pneumonia). Symptoms can range from mild to severe. Some people have no symptoms. These viruses are also found in some animals.   All 50 states in the [place] have reported cases of COVID-19. Most states report \"community spread\" of COVID-19. This means the source of the illness is not known. COVID-19 is a rapidly-emerging infectious disease. This means that scientists are actively researching it. There are information updates regularly.   Public health officials are working to find the source. How the virus spreads is not yet fully understood, but it seems to spread and infect people fairly easily. Some people who have been infected in an area may not be sure how or where they were infected. The virus may be spread  through droplets of fluid that a person coughs or sneezes into the air. It may be spread if you touch a surface with the virus on it, such as a handle or object, and then touch your eyes, nose, or mouth.   For the latest information, visit the CDC website at www.cdc.gov/coronavirus/2019-ncov. Or call 349-SNJ-PIEL (480-052-8590).   What are the symptoms of COVID-19?  Some people have no symptoms or mild symptoms. Symptoms can also vary from person to person. As experts learn more about COVID-19, other symptoms are being reported. Symptoms may appear 2 to 14 days after contact with the virus:   · Fever  · Coughing  · Trouble breathing or feeling short of breath  · Sore throat  · Runny nose  · Headache and body aches  · Chills or repeated shaking with chills  · Fatigue  · Loss of appetite  · Nausea, vomiting, diarrhea, or abdominal pain  · Loss of sense of smell and taste  You can check your symptoms with the CDC’s Coronavirus Self-.   What are possible complications from COVID-19?  In many cases, this virus can cause infection (pneumonia) in both lungs. In some cases, this can cause death. Certain people are at higher risk for complications. This includes older adults and people with serious chronic health conditions such as heart or lung disease, diabetes, or kidney disease. It includes people with health conditions that suppress the immune system. And it includes people taking medicines that suppress the immune system.   As experts learn more about COVID-19, other complications are being reported that may be linked to COVID-19. Rarely, some children have developed severe complications called multisystem inflammatory syndrome in children (MIS-C). MIS-C seems to be similar to [place] disease, a rare condition causing inflammation of blood vessels and body organs. It's not yet known if MIS-C happens only in children, or if adults are also at risk. It's also not known if it's related to COVID-19, because many  children, but not all, have tested positive for the virus. Experts continue to study MIS-C. The CDC advises healthcare providers to report to local health departments any person under age 21 years old who is ill enough to be in the hospital and has all of the following:   · A fever over 100.4°F (38.0°C) for more than 24 hours and a positive SARS-CoV-2 test or exposure to the virus in the last 4 weeks  · Inflammation in at least 2 organs such as the heart, lungs, or kidneys with lab tests that show inflammation  · No other diagnoses besides COVID-19 explain the child's symptoms  How is COVID-19 diagnosed?  Your healthcare provider will ask about your symptoms. He or she will ask where you live, and about your recent travel, and any contact with sick people. If your healthcare provider thinks you may have COVID-19, he or she will consider whether to test you for COVID-19. This depends on the availability of testing in your area, and how sick you are. Follow all instructions from your healthcare provider. Guidelines for testing may change as more information about the virus becomes available. Currently, COVID-19 is diagnosed by:   · Nose-throat swab.  A swab is wiped inside your nose to the back of your throat. This is a viral test to tell you if you have a current COVID-19 infection.  If your healthcare provider thinks or confirms that you have COVID-19, you may have other tests. These tests may include:   · Antibody blood test.  Antibody tests are being looked at to find out if a person has previously been infected with the virus and may now have antibodies such as SARS AB IgG in their blood to give some immunity. The accuracy and availability of antibody tests vary. An antibody test may not be able to show if you have a current infection because it can take up to a few weeks after infection to make antibodies. It's not yet known how long immunity lasts after being infected with the virus.  · Sputum culture.  A small  sample of mucus coughed from your lungs (sputum) may be collected if you have a moist cough. It may be checked for the virus or to look for pneumonia.  · Imaging tests.  You may have a chest X-ray or CT scan.  How is COVID-19 treated?  There is currently no medicine proven to prevent or treat the virus. Some experimental medicines are being tested for COVID-19. Other medicines used to treat other conditions are being looked at for COVID-19, but these are not currently approved to treat it.   The most proven treatments right now are those to help your body while it fights the virus. This is known as supportive care. Supportive care may include:   · Getting rest.  This helps your body fight the illness.  · Staying hydrated.  Drinking liquids is the best way to prevent dehydration.. Try to drink 6 to 8 glasses of liquids every day, or as advised by your provider. Also check with your provider about which fluids are best for you. Don't drink fluids that contain caffeine or alcohol.  · Taking over-the-counter (OTC) pain medicine.  These are used to help ease pain and reduce fever. Follow your healthcare provider's instructions for which OTC medicine to use.  For severe illness, you may need to stay in the hospital. Care during severe illness may include:   · IV (intravenous) fluids.  These are given through a vein to help keep your body hydrated.  · Oxygen. You may be given supplemental oxygen or ventilation with a breathing machine (ventilator). This is done so you get enough oxygen in your body.  · Prone positioning.  Depending on how sick you are during your hospital stay, your healthcare team may turn you regularly on your stomach. This is called prone positioning. It helps increase the amount of oxygen you get to your lungs. Follow your healthcare team's instructions on position changes while you're in the hospital. Also follow their discharge advice on the best positions to help your breathing once you go  home.  People who have had COVID-19 and are fully recovered may be asked by their healthcare team to consider donating plasma. This is called COVID-19 convalescent plasma donation. Plasma from people fully recovered from COVID-19 may contain antibodies to help fight COVID-19 in people who are currently seriously ill with the disease. It's not fully known if the donated plasma will work well as a treatment, but the FDA is looking at it and has asked the [employer] [place] to help with plasma donation and collection. Talk with your provider to learn more about convalescent plasma donation and whether you qualify to donate.   Are you at risk for COVID-19?  You are at risk for COVID-19 if you have had close contact with someone with the virus, or if you live in or traveled to an area with cases of it. Close contact means being within about 6 feet of someone, or living in the same house or visiting a person who has or may have COVID-19. Some recent studies suggest that COVID-19 may be spread by people who are not showing symptoms.   Date last modified: 5/20/2020  [employer] last reviewed this educational content on 1/1/2020  © 7770-8153 [employer]. [address] [place] [address], [place] [address]. All rights reserved. This information is not intended as a substitute for professional medical care. Always follow your healthcare professional's instructions.            independent

## 2022-07-18 NOTE — OCCUPATIONAL THERAPY INITIAL EVALUATION ADULT - RANGE OF MOTION EXAMINATION, LOWER EXTREMITY
except Left Hip Flexion limited secondary to surgery/bilateral LE Active ROM was WFL  (within functional limits)

## 2022-07-18 NOTE — PHYSICAL THERAPY INITIAL EVALUATION ADULT - ADDITIONAL COMMENTS
Pt reports that he lives in a private house with his mother with ~5 steps to enter; (+)bilateral handrails; and a flight of stairs to negotiate to bedroom; (+)1 banister that according to the patient is not sturdy. Prior to hospital admission pt was completely independent and used no assistive device with ambulation. He was however given a single axis cane on Friday by his mother.    Pt left comfortable in bed, NAD, all lines intact, all precautions maintained, with call bell in reach, bed alarm on, and RN aware of PT evaluation and pt's pain.

## 2022-07-18 NOTE — PHYSICAL THERAPY INITIAL EVALUATION ADULT - MANUAL MUSCLE TESTING RESULTS, REHAB EVAL
Bilateral UE 5/5, right LE 3+/5, Left Hamstring 3-/5, left quad 2/5, left anterior tibialis 3/5 Bilateral UE 5/5, right LE 3+/5, Left Hamstring 2/5, left quad 2/5, left anterior tibialis 3/5

## 2022-07-18 NOTE — DISCHARGE NOTE PROVIDER - NSDCCPTREATMENT_GEN_ALL_CORE_FT
PRINCIPAL PROCEDURE  Procedure: Intramedullary nailing of left femur  Findings and Treatment:       SECONDARY PROCEDURE  Procedure: XR hip, 2 views  Findings and Treatment: XR KNEE 1-2 VIEWS, FEMUR 2 VIEWS, HIPS BI WITH PELV 2V                        PROCEDURE DATE:  07/16/2022    IMPRESSION:  Acute comminuted intertrochanteric fracture of the LEFT proximal femur.

## 2022-07-18 NOTE — DISCHARGE NOTE PROVIDER - HOSPITAL COURSE
55 y/o M w PMH of paranoid schizophrenia (with multiple hospitalizations) presents with acute left hip fracture, s/p IMN 7/17.     On admission, patient found to not have capacity to make medical decisions per primary and psych evaluations. Mother is Tiffanie Wadsworth who is next of kin and was able to give consent for IMN of left femur. Patient s/p IMN on 7/17 with no complications. Evaluated by PT and recommended [facility] for strength training. Per ortho, patient to get aspirin 325 BID for 4-6 weeks. Recommend outpatient followup with orthopedic for continued care. Per psych, no current intervention as patient stable during hospitalization. Patient otherwise medically stable for discharge to [facility].   57 y/o M w PMH of paranoid schizophrenia (with multiple hospitalizations) presents with acute left hip fracture, s/p IMN 7/17.     On admission, patient found to not have capacity to make medical decisions per primary and psych evaluations. Mother is Tiffanie Wadsworth who is next of kin and was able to give consent for IMN of left femur. Patient s/p IMN on 7/17 with no complications. Evaluated by PT and recommended [facility] for strength training. Per ortho, patient to get aspirin 325 BID for 4-6 weeks. Recommend outpatient followup with orthopedic for continued care. Per psych, patient started on risperdal 1mg qhs; patient otherwise noncompliant with psych medications at home. Patient otherwise medically stable for discharge to acute rehab.   55 y/o M w PMH of paranoid schizophrenia (with multiple hospitalizations) presents with acute left hip fracture, s/p IMN 7/17.     On admission, patient found to not have capacity to make medical decisions per primary and psych evaluations. Mother is Tiffanie Wadsworth who is next of kin and was able to give consent for IMN of left femur. Patient s/p IMN on 7/17 with no complications. Evaluated by PT and recommended [facility] for strength training. Per ortho, patient to get aspirin 325 BID for 4-6 weeks. Recommend outpatient followup with orthopedic for continued care; Dr. Stafford in 1-2 after discharge; RIP emailed to coordinate discharge appointments. Per psych, patient started on risperdal 1mg qhs; patient otherwise noncompliant with psych medications at home. Patient compliant with risperdal in the inpatient setting and would benefit from continuing psych therapy in rehab and in the outpatient setting. Patient otherwise medically stable for discharge to acute rehab.   57 y/o M w PMH of paranoid schizophrenia (with multiple hospitalizations) presents with acute left hip fracture, s/p IMN 7/17.     On admission, patient found to not have capacity to make medical decisions per primary and psych evaluations. Mother is Tiffanie Wadsworth who is next of kin and was able to give consent for IMN of left femur. Patient s/p IMN on 7/17 with no complications. Evaluated by PT and recommended acute rehab for strength training. Per ortho, patient to get aspirin 325 BID for 4-6 weeks. Recommend outpatient followup with orthopedic for continued care; Dr. Stafford in 1-2 after discharge; RIP emailed to coordinate discharge appointments. Per psych, patient started on risperdal 1mg qhs; patient otherwise noncompliant with psych medications at home. Patient compliant with risperdal in the inpatient setting and would benefit from continuing psych therapy in rehab and in the outpatient setting. Patient also with Hgb 8.4-7.8 after surgery which was believed to be due to hypovolemia 2/2 anesthetics. Had no symptoms otherwise; PE with no signs of compartment syndrome of the leg. No indications to transfuse at the time. Iron studies, B12, and folate all wnl; counseled patient on importance of screening colonoscopy in the outpatient setting. RIP emailed for followup appointments with PCP and GI. Patient otherwise medically stable for discharge to acute rehab.   57 y/o M w PMH of paranoid schizophrenia (with multiple hospitalizations) presents with acute left hip fracture, s/p IMN 7/17.     On admission, patient found to not have capacity to make medical decisions per primary and psych evaluations. Mother is Tiffanie Wadsworth who is next of kin and was able to give consent for IMN of left femur. Patient s/p IMN on 7/17 with no complications. Evaluated by PT and recommended acute rehab for strength training. Per ortho, patient to get aspirin 325 BID for 4-6 weeks. Recommend outpatient followup with orthopedic for continued care; Dr. Stafford in 1-2 after discharge; RIP emailed to coordinate discharge appointments. Pt evaluated by psychiatry and offered risperdal 1mg QHS, but patient declined. Per psych, patient is calm, AAOx3, and not a danger to himself or others and therefore, has capacity to decline.    Patient also with Hgb 8.4-7.8 after surgery which was believed to be due to hypovolemia 2/2 anesthetics. Had no symptoms otherwise; PE with no signs of compartment syndrome of the leg. No indications to transfuse at the time. Iron studies, B12, and folate all wnl.     Since surgery, patient has been compliant with physical therapy and continues to improve with daily PT. PT cleared pt for d/c home with outpatient PT 7/31. Pt medically stable for d/c.    57 y/o M w PMH of paranoid schizophrenia (with multiple hospitalizations) presents with acute left hip fracture, s/p IMN 7/17.     On admission, patient found to not have capacity to make medical decisions per primary and psych evaluations. Mother is Tiffanie Wadsworth who is next of kin and was able to give consent for IMN of left femur. Patient s/p IMN on 7/17 with no complications. Evaluated by PT and recommended acute rehab for strength training. Per ortho, patient to get aspirin 325 BID for 4-6 weeks. Recommend outpatient followup with orthopedic for continued care; Dr. Stafford in 1-2 after discharge; RIP emailed to coordinate discharge appointments. Pt evaluated by psychiatry and offered risperdal 1mg QHS, but patient declined. Per psych, patient is calm, AAOx3, and not a danger to himself or others and therefore, has capacity to decline.    Patient also with Hgb 8.4-7.8 after surgery which was believed to be due to hypovolemia 2/2 anesthetics. Had no symptoms otherwise; PE with no signs of compartment syndrome of the leg. No indications to transfuse at the time. Iron studies, B12, and folate all wnl.     Since surgery, patient has been compliant with physical therapy and continues to improve with daily PT. PT cleared pt for d/c home with outpatient PT 7/31. Per psych, he is not eligible for [facility] hospitalization. Per is mom, he is not welcomed at home as she fears for her safety. He is refusing discharge to shelter and is not eligible for [employer] coverage with is new insurance. He states that he would like to go to a hotel, thus he will be discharged.
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
## 2022-07-18 NOTE — DISCHARGE NOTE PROVIDER - CARE PROVIDER_API CALL
Lety Stafford (MD; MPH)  Orthopaedic Surgery  [address], [address]  [place] [address]  Phone: [phone]  Fax: [phone]  Follow Up Time: 2 weeks

## 2022-07-18 NOTE — PROGRESS NOTE ADULT - PROBLEM SELECTOR PLAN 4
-mild thrombocytopenia noted  -check b12, folate, hep c  -check peripheral smear if continues to downtrend  -hold aspirin for now -mild thrombocytopenia noted  -B12 and folate wnl  -check peripheral smear if continues to downtrend  -hold aspirin for now
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
## 2022-07-18 NOTE — PHYSICAL THERAPY INITIAL EVALUATION ADULT - DIAGNOSIS, PT EVAL
Pt admitted for fall; X-ray (+)Acute comminuted intertrochanteric fx of left proximal Femur; s/p Left Hip IMN on 07/17/2022; pt presents with decreased strength, decreased balance, and difficulty with ambulation.

## 2022-07-18 NOTE — DISCHARGE NOTE PROVIDER - NSDCFUADDAPPT_GEN_ALL_CORE_FT
For outpatient follow up with the psychiatric team, please make an appointment at [phone] with LISA FAY

## 2022-07-18 NOTE — PROGRESS NOTE ADULT - SUBJECTIVE AND OBJECTIVE BOX
SUBJECTIVE  No acute events overnight. Pain controlled.    OBJECTIVE  Vital Signs Last 24 Hrs  T(C): 36.8 (17 Jul 2022 20:20), Max: 36.9 (17 Jul 2022 05:22)  T(F): 98.2 (17 Jul 2022 20:20), Max: 98.4 (17 Jul 2022 05:22)  HR: 74 (17 Jul 2022 20:20) (66 - 94)  BP: 110/64 (17 Jul 2022 20:20) (110/64 - 142/80)  BP(mean): 88 (17 Jul 2022 19:00) (71 - 102)  RR: 18 (17 Jul 2022 20:20) (10 - 19)  SpO2: 99% (17 Jul 2022 20:20) (96% - 100%)  Parameters below as of 17 Jul 2022 20:20  Patient On (Oxygen Delivery Method): room air    PHYSICAL EXAM  Gen: Lying in bed, NAD  Resp: No increased WOB  LLE:  Dressing c/d/i, compartments soft, no calf TTP b/l  Motor: TA/EHL/GS/FHL intact  Sensory: DP/SP/Tib/Inna/Saph SILT  +DP pulse, WWP    LABS                        9.7    8.08  )-----------( 113      ( 17 Jul 2022 12:30 )             31.0     07-17    141  |  107  |  24<H>  ----------------------------<  123<H>  4.1   |  20<L>  |  0.73    Ca    8.6      17 Jul 2022 12:30    TPro  6.8  /  Alb  3.6  /  TBili  0.6  /  DBili  0.2  /  AST  56<H>  /  ALT  51<H>  /  AlkPhos  46  07-17    PT/INR - ( 17 Jul 2022 00:56 )   PT: 13.1 sec;   INR: 1.13 ratio      PTT - ( 17 Jul 2022 00:56 )  PTT:22.2 sec    ASSESSMENT & PLAN  56yMale s/p L femoral IMN on 7/17/22.  -WBAT LLE  -pain control  -ice/cold compress  -incentive spirometry  -DVT ppx  -PT/OT SUBJECTIVE  No acute events overnight. Pain controlled.    OBJECTIVE  Vital Signs Last 24 Hrs  T(C): 36.8 (17 Jul 2022 20:20), Max: 36.9 (17 Jul 2022 05:22)  T(F): 98.2 (17 Jul 2022 20:20), Max: 98.4 (17 Jul 2022 05:22)  HR: 74 (17 Jul 2022 20:20) (66 - 94)  BP: 110/64 (17 Jul 2022 20:20) (110/64 - 142/80)  BP(mean): 88 (17 Jul 2022 19:00) (71 - 102)  RR: 18 (17 Jul 2022 20:20) (10 - 19)  SpO2: 99% (17 Jul 2022 20:20) (96% - 100%)  Parameters below as of 17 Jul 2022 20:20  Patient On (Oxygen Delivery Method): room air    PHYSICAL EXAM  Gen: Lying in bed, NAD  Resp: No increased WOB  LLE:  Dressing c/d/i, compartments soft, no calf TTP b/l  Motor: TA/EHL/GS/FHL intact  Sensory: DP/SP/Tib/Inna/Saph SILT  +DP pulse, WWP    LABS                        9.7    8.08  )-----------( 113      ( 17 Jul 2022 12:30 )             31.0     07-17    141  |  107  |  24<H>  ----------------------------<  123<H>  4.1   |  20<L>  |  0.73    Ca    8.6      17 Jul 2022 12:30    TPro  6.8  /  Alb  3.6  /  TBili  0.6  /  DBili  0.2  /  AST  56<H>  /  ALT  51<H>  /  AlkPhos  46  07-17    PT/INR - ( 17 Jul 2022 00:56 )   PT: 13.1 sec;   INR: 1.13 ratio      PTT - ( 17 Jul 2022 00:56 )  PTT:22.2 sec    ASSESSMENT & PLAN  56yMale s/p L femoral IMN on 7/17/22.  -WBAT LLE  -pain control  -ice/cold compress  -incentive spirometry  -DVT ppx: Lovenox (when discharged, please send out on aspirin 325 BID for 4-6 weeks  -PT/OT SUBJECTIVE  No acute events overnight. Pain controlled.    OBJECTIVE  Vital Signs Last 24 Hrs  T(C): 36.8 (17 Jul 2022 20:20), Max: 36.9 (17 Jul 2022 05:22)  T(F): 98.2 (17 Jul 2022 20:20), Max: 98.4 (17 Jul 2022 05:22)  HR: 74 (17 Jul 2022 20:20) (66 - 94)  BP: 110/64 (17 Jul 2022 20:20) (110/64 - 142/80)  BP(mean): 88 (17 Jul 2022 19:00) (71 - 102)  RR: 18 (17 Jul 2022 20:20) (10 - 19)  SpO2: 99% (17 Jul 2022 20:20) (96% - 100%)  Parameters below as of 17 Jul 2022 20:20  Patient On (Oxygen Delivery Method): room air    PHYSICAL EXAM  Gen: Lying in bed, NAD  Resp: No increased WOB  LLE:  Dressings c/d/i, compartments soft, no calf TTP b/l  Motor: TA/EHL/GS/FHL intact  Sensory: DP/SP/Tib/Inna/Saph SILT  +DP pulse, WWP    LABS                        9.7    8.08  )-----------( 113      ( 17 Jul 2022 12:30 )             31.0     07-17    141  |  107  |  24<H>  ----------------------------<  123<H>  4.1   |  20<L>  |  0.73    Ca    8.6      17 Jul 2022 12:30    TPro  6.8  /  Alb  3.6  /  TBili  0.6  /  DBili  0.2  /  AST  56<H>  /  ALT  51<H>  /  AlkPhos  46  07-17    PT/INR - ( 17 Jul 2022 00:56 )   PT: 13.1 sec;   INR: 1.13 ratio      PTT - ( 17 Jul 2022 00:56 )  PTT:22.2 sec    ASSESSMENT & PLAN  56yMale s/p L femoral IMN on 7/17/22.  -WBAT LLE  -pain control  -ice/cold compress  -incentive spirometry  -DVT ppx: Lovenox (when discharged, please send out on aspirin 325 BID for 4-6 weeks  -PT/OT

## 2022-07-18 NOTE — PROGRESS NOTE ADULT - PROBLEM SELECTOR PLAN 1
-pt p/w acute left hip fracture. Initially pt refusing surgery; then agreeable and spoke with mother. IMN on 7/17  - consulted for assessment of insight/capacity  -in regards to medical clearance, pt with METs at least >4. Denies chest pain or resp symptoms. No cardiac history. EKG non ischemic. RCRI score 0. Pt medically optimized for mod risk procedure  -pain control  -bowel regimen  -dvt ppx - lovenox -pt p/w acute left hip fracture. Initially pt refusing surgery; then agreeable and spoke with mother. IMN on 7/17  - c/w pain control  - c/w bowel regimen  - dvt ppx - lovenox  - pending OT recs  - PMNR suggesting rehab

## 2022-07-18 NOTE — OCCUPATIONAL THERAPY INITIAL EVALUATION ADULT - DIAGNOSIS, OT EVAL
Patient has the following symptoms  Patient called said he is having trouble sleeping. Patient said he had the Shingles injections and a Covid injection. Patient is wondering if it is due to the injections? Patient said he keeps getting up all night long for the past 3 days. Patient would like a call back to discuss.     The symptoms have been present for 3 days    Patient's pharmacy is [facility]    Patient's pharmacy verified in Epic Yes    Callback Number:  [phone]    Best Availability:  any    Can A Detailed Message Be Left? Yes    Additional Info:  no     s/p Intramedullary nailing of left femur; Decreased functional mobility; Decreased ADL management

## 2022-07-18 NOTE — DISCHARGE NOTE PROVIDER - NSDCCPCAREPLAN_GEN_ALL_CORE_FT
PRINCIPAL DISCHARGE DIAGNOSIS  Diagnosis: Fracture of hip  Assessment and Plan of Treatment: You came into the hospital after a fall and sustained fracture of your femur. We did an XR which helped us visualize the fracture. Our orthopedic surgeons performed an intermedullary nailing procedure of your L femur. You had no complications from the surgery and were recovering well. We had out PT, OT and PMNR evaluate you and they all recommened acute rehab for you to increased strength and to improve mobility. Please follow up with orthopedic surgery for continued care after discharge.      SECONDARY DISCHARGE DIAGNOSES  Diagnosis: Paranoid schizophrenia  Assessment and Plan of Treatment: You came in with a chronic diagnosis of paranoid schizophrenia. You were evaluated by psychiatry who recommened started you on a medication called Risperdal since you were expressing many tangential thoughts and on evaluations appeared psychotic. You remained stable on that medication. We recommend that you continue taking this medication on discharge and following up with your psychiatrist outpatient.     PRINCIPAL DISCHARGE DIAGNOSIS  Diagnosis: Fracture of hip  Assessment and Plan of Treatment: You came into the hospital after a fall and sustained fracture of your femur. We did an XR which helped us visualize the fracture. Our orthopedic surgeons performed an intermedullary nailing procedure of your L femur. You had no complications from the surgery and were recovering well. We had out PT, OT and PMNR evaluate you and they all recommened acute rehab for you to increased strength and to improve mobility. Please follow up with orthopedic surgery Dr. Stafford in 1-2 weeks for continued care after discharge.      SECONDARY DISCHARGE DIAGNOSES  Diagnosis: Paranoid schizophrenia  Assessment and Plan of Treatment: You came in with a chronic diagnosis of paranoid schizophrenia. You were evaluated by psychiatry who recommened started you on a medication called Risperdal since you were expressing many tangential thoughts and on evaluations appeared psychotic. You remained stable on that medication. We recommend that you continue taking this medication on discharge and following up with your psychiatrist outpatient.    Diagnosis: Periodic health assessment, general screening, adult  Assessment and Plan of Treatment: We recommend that you schedule an appointment with GI in order to schedule a screening colonoscopy given your age > 50 and gender. Please make sure to follow up.     PRINCIPAL DISCHARGE DIAGNOSIS  Diagnosis: Fracture of hip  Assessment and Plan of Treatment: You came into the hospital after a fall and sustained fracture of your femur. We did an XR which helped us visualize the fracture. Our orthopedic surgeons performed an intermedullary nailing procedure of your L femur. You had no complications from the surgery and were recovering well. We had our PT, OT and PMNR evaluate you and they all recommened acute rehab for you to increased strength and to improve mobility. Please follow up with orthopedic surgery Dr. Stafford in 1-2 weeks for continued care after discharge.      SECONDARY DISCHARGE DIAGNOSES  Diagnosis: Paranoid schizophrenia  Assessment and Plan of Treatment: You came in with a chronic diagnosis of paranoid schizophrenia. You were evaluated by psychiatry who recommened started you on a medication called Risperdal, but you declined. We recommend that you follow up with your psychiatrist outpatient.    Diagnosis: Periodic health assessment, general screening, adult  Assessment and Plan of Treatment: We recommend that you schedule an appointment with GI in order to schedule a screening colonoscopy given your age > 50 and gender. Please make sure to follow up.

## 2022-07-18 NOTE — OCCUPATIONAL THERAPY INITIAL EVALUATION ADULT - MD ORDER
Occupational Therapy (OT) to evaluate and treat. Out of Bed to Chair. Per SIOBHAN Marin, pt is okay to participate in OT evaluation and perform activity as tolerated.

## 2022-07-18 NOTE — PHYSICAL THERAPY INITIAL EVALUATION ADULT - GAIT DEVIATIONS NOTED, PT EVAL
decreased jamal/increased time in double stance/decreased step length/decreased stride length/decreased weight-shifting ability

## 2022-07-18 NOTE — OCCUPATIONAL THERAPY INITIAL EVALUATION ADULT - GENERAL OBSERVATIONS, REHAB EVAL
Pt. received semisupine in bed. No acute distress. Patient agreed to evaluation from Occupational Therapist. +Clean dry intact dressing to Left Hip, +Right LE Ace Wrap, +Heplock.

## 2022-07-18 NOTE — PROGRESS NOTE ADULT - PROBLEM SELECTOR PLAN 5
-no prior labs for comparison  -alcohol use? Pt denies but poor historian, twice denied  -check acute viral panel  -if persistently elevated, check abdominal US to evaluate -no prior labs for comparison  -alcohol use? Pt denies but poor historian, twice denied  - Hep panel negative  - downtrending AST/ALT

## 2022-07-18 NOTE — PROGRESS NOTE ADULT - ASSESSMENT
55 y/o M w PMH of paranoid schizophrenia (with multiple hospitalizations) presents with acute left hip fracture, s/p IMN 7/17. Psych consult for medication mgmt of schizophrenia.

## 2022-07-18 NOTE — PROGRESS NOTE ADULT - SUBJECTIVE AND OBJECTIVE BOX
Dalia Singleton  PGY-1 Resident Physician   Pager [phone]/ [phone]    Patient is a 56y old  Male who presents with a chief complaint of hip pain (18 Jul 2022 07:34)      SUBJECTIVE / OVERNIGHT EVENTS:  Patient seen and evaluated at bedside.  Patient has no complaints this morning. Says he is feeling well. Only has leg pain when he tries to raise his leg. States that he is having "inflammation" of his leg and that he will be better once the inflammation is resolved. Asking to call the "[employer] for toilet pills" and "[employer] for IV Ca." Left incentive spirometer with him at bedside after explaining how he can go about using it. Patient able to demonstrate how to use and states he will use throughout the day.  Denies any fevers, chills, CP, or SOB.    Vital Signs Last 24 Hrs  T(C): 36.9 (18 Jul 2022 05:25), Max: 36.9 (18 Jul 2022 05:25)  T(F): 98.4 (18 Jul 2022 05:25), Max: 98.4 (18 Jul 2022 05:25)  HR: 86 (18 Jul 2022 05:25) (66 - 94)  BP: 113/66 (18 Jul 2022 05:25) (110/64 - 142/80)  BP(mean): 88 (17 Jul 2022 19:00) (71 - 102)  RR: 16 (18 Jul 2022 05:25) (10 - 19)  SpO2: 100% (18 Jul 2022 05:25) (96% - 100%)    Parameters below as of 18 Jul 2022 05:25  Patient On (Oxygen Delivery Method): room air        PHYSICAL EXAM:  GENERAL: NAD, well-developed  CHEST/LUNG: Clear to auscultation bilaterally; No wheeze  HEART: Regular rate and rhythm; Normal S1 S2, No murmurs, rubs, or gallops  ABDOMEN: Soft, Nontender, Nondistended; Bowel sounds present  EXTREMITIES:  L leg generally larger than R leg with bandage on lateral thigh. General   PSYCH: AAOx3    LABS:                        8.2    14.04 )-----------( 116      ( 18 Jul 2022 05:25 )             26.1     Hgb Trend: 8.2<--, 9.7<--, 9.4<--, 10.3<--  07-18    138  |  104  |  22  ----------------------------<  110<H>  3.9   |  25  |  0.79    Ca    8.2<L>      18 Jul 2022 05:25  Phos  2.3     07-18  Mg     2.20     07-18    TPro  6.8  /  Alb  3.6  /  TBili  0.6  /  DBili  0.2  /  AST  56<H>  /  ALT  51<H>  /  AlkPhos  46  07-17    Creatinine Trend: 0.79<--, 0.73<--, 0.73<--, 0.93<--  LIVER FUNCTIONS - ( 17 Jul 2022 12:30 )  Alb: 3.6 g/dL / Pro: 6.8 g/dL / ALK PHOS: 46 U/L / ALT: 51 U/L / AST: 56 U/L / GGT: x           PT/INR - ( 17 Jul 2022 00:56 )   PT: 13.1 sec;   INR: 1.13 ratio         PTT - ( 17 Jul 2022 00:56 )  PTT:22.2 sec       Dalia Singleton  PGY-1 Resident Physician   Pager [phone]/ [phone]    Patient is a 56y old  Male who presents with a chief complaint of hip pain (18 Jul 2022 07:34)      SUBJECTIVE / OVERNIGHT EVENTS:  Patient seen and evaluated at bedside.  Patient has no complaints this morning. Says he is feeling well. Only has leg pain when he tries to raise his leg. States that he is having "inflammation" of his leg and that he will be better once the inflammation is resolved. Asking to call the "[employer] for toilet pills" and "[employer] for IV Ca." Left incentive spirometer with him at bedside after explaining how he can go about using it. Patient able to demonstrate how to use and states he will use throughout the day.  Denies any fevers, chills, CP, or SOB.    Vital Signs Last 24 Hrs  T(C): 36.9 (18 Jul 2022 05:25), Max: 36.9 (18 Jul 2022 05:25)  T(F): 98.4 (18 Jul 2022 05:25), Max: 98.4 (18 Jul 2022 05:25)  HR: 86 (18 Jul 2022 05:25) (66 - 94)  BP: 113/66 (18 Jul 2022 05:25) (110/64 - 142/80)  BP(mean): 88 (17 Jul 2022 19:00) (71 - 102)  RR: 16 (18 Jul 2022 05:25) (10 - 19)  SpO2: 100% (18 Jul 2022 05:25) (96% - 100%)    Parameters below as of 18 Jul 2022 05:25  Patient On (Oxygen Delivery Method): room air        PHYSICAL EXAM:  GENERAL: NAD, well-developed  CHEST/LUNG: Clear to auscultation bilaterally; No wheeze  HEART: Regular rate and rhythm; Normal S1 S2, No murmurs, rubs, or gallops  ABDOMEN: Soft, Nontender, Nondistended; Bowel sounds present  EXTREMITIES:  L leg generally larger than R leg with bandage on lateral thigh. Generally, not warm or erythematous appearing. Has scrape wound on R knee from fall; currently wrapped ; not draining blood or pus.   PSYCH: AAOx3    LABS:                        8.2    14.04 )-----------( 116      ( 18 Jul 2022 05:25 )             26.1     Hgb Trend: 8.2<--, 9.7<--, 9.4<--, 10.3<--  07-18    138  |  104  |  22  ----------------------------<  110<H>  3.9   |  25  |  0.79    Ca    8.2<L>      18 Jul 2022 05:25  Phos  2.3     07-18  Mg     2.20     07-18    TPro  6.8  /  Alb  3.6  /  TBili  0.6  /  DBili  0.2  /  AST  56<H>  /  ALT  51<H>  /  AlkPhos  46  07-17    Creatinine Trend: 0.79<--, 0.73<--, 0.73<--, 0.93<--  LIVER FUNCTIONS - ( 17 Jul 2022 12:30 )  Alb: 3.6 g/dL / Pro: 6.8 g/dL / ALK PHOS: 46 U/L / ALT: 51 U/L / AST: 56 U/L / GGT: x           PT/INR - ( 17 Jul 2022 00:56 )   PT: 13.1 sec;   INR: 1.13 ratio         PTT - ( 17 Jul 2022 00:56 )  PTT:22.2 sec

## 2022-07-18 NOTE — PROGRESS NOTE ADULT - PROBLEM SELECTOR PLAN 3
-as per history, likely mechanical fall  -denies preceding symptoms  -EKG wnl  -PT eval once acute ortho issues intervened on - WBC 14.04 this AM likely reactive 2/2 surgery  - currently not endorsing any symptoms of infection  - incentive spirometer at bedside and patient educated on use

## 2022-07-18 NOTE — OCCUPATIONAL THERAPY INITIAL EVALUATION ADULT - PERTINENT HX OF CURRENT PROBLEM, REHAB EVAL
Pt is a 56 year old male with hx of paranoid schizophrenia (with multiple hospitalizations), who presented to [facility] on 7/16/22 with leg pain s/p fall last week. Xray of Femur on 7/16/22 displayed acute comminuted intertrochanteric fracture of the Left proximal femur. Pt is now s/p Intramedullary nailing of left femur on 7/17/22.

## 2022-07-18 NOTE — BH CONSULTATION LIAISON PROGRESS NOTE - NSBHASSESSMENTFT_PSY_ALL_CORE
Patient is a 55 y/o M w PSH of tonsillectomy, no prevenlant PMH, PPHx of schizophrenia, presents with acute left hip fracture. Patient has a hx of inpatient psychiatric admissions, as per chart review patient with multiple inpatient admission, patient has 2 admission to [facility] in 2008 and 2015. It is noted that patient has a hx of being on haldol 5mg TID and cogentin, however currently on no medications. Patient has no known SA, no hx of violence noted in chart review, no reported substance abuse. Patient not in current psychiatric treatment. Psychiatry called for capacity as patient is refusing recommended surgical repair of hip fracture. Determined patient lackedapacity to refuse surgical intervention, recommend family involvement in decision making.    7/18: Patient received surgery, though he continues to disagree with fact that he needed it. He is agreeable of dispo to rehab. Attempted to engage in discussion with patient to start antipsychotic, though he continues to lack insight/judgement, denies psych history. Would offer antipsychotic though he may refuse.    PLAN:  - START Risperdal 1mg qhs (though patient is likely to refuse, he would benefit from antipsychotic)  - Patient currently calm, behavior in control, chronic delusions  - antipsychotics can only be given if qtc < 500   - PRN for agitation, haldol 5mg q6hrs if qtc < 500  - no SI or HI. defer observational status to primary team - determine physical abilities and if at risk for  - Patient currently does not meet criteria for inpatient psychiatric hospitalization, as he is not an acute danger to himself or others

## 2022-07-18 NOTE — OCCUPATIONAL THERAPY INITIAL EVALUATION ADULT - LIVES WITH, PROFILE
Pt. reports he lives with his mother in a house with 5 steps to enter. Once inside, pt. reports he has a full flight of steps to negotiate to reach 2nd floor where main bedroom and bathroom are located. Per pt., he has a bathtub in his bathroom.

## 2022-07-18 NOTE — PHYSICAL THERAPY INITIAL EVALUATION ADULT - RANGE OF MOTION EXAMINATION, REHAB EVAL
left knee flexion ~60 degrees/bilateral upper extremity ROM was WFL (within functional limits)/Right LE ROM was WFL (within functional limits)

## 2022-07-18 NOTE — DISCHARGE NOTE PROVIDER - NSDCMRMEDTOKEN_GEN_ALL_CORE_FT
acetaminophen 325 mg oral tablet: 3 tab(s) orally every 8 hours  aluminum hydroxide-magnesium hydroxide 200 mg-200 mg/5 mL oral suspension: 30 milliliter(s) orally every 4 hours, As needed, Dyspepsia  melatonin 3 mg oral tablet: 1 tab(s) orally once a day (at bedtime), As needed, Insomnia  polyethylene glycol 3350 oral powder for reconstitution: 17 gram(s) orally once a day  senna leaf extract oral tablet: 2 tab(s) orally once a day (at bedtime)   aluminum hydroxide-magnesium hydroxide 200 mg-200 mg/5 mL oral suspension: 30 milliliter(s) orally every 4 hours, As needed, Dyspepsia  melatonin 3 mg oral tablet: 1 tab(s) orally once a day (at bedtime), As needed, Insomnia  risperiDONE 1 mg oral tablet: 1 tab(s) orally once a day (at bedtime)  senna leaf extract oral tablet: 2 tab(s) orally once a day (at bedtime)   aluminum hydroxide-magnesium hydroxide 200 mg-200 mg/5 mL oral suspension: 30 milliliter(s) orally every 4 hours, As needed, Dyspepsia  melatonin 3 mg oral tablet: 1 tab(s) orally once a day (at bedtime), As needed, Insomnia  Multiple Vitamins oral tablet: 1 tab(s) orally once a day  pantoprazole 40 mg oral delayed release tablet: 1 tab(s) orally once a day (before a meal)  Physical Therapy :   senna leaf extract oral tablet: 2 tab(s) orally once a day (at bedtime)  Walker - 1 piece :    acetaminophen 325 mg oral tablet: 3 tab(s) orally every 8 hours  Adult Aspirin 325 mg oral tablet: 1 tab(s) orally 2 times a day   Physical Therapy :   Walker - 1 piece :

## 2022-07-18 NOTE — PHYSICAL THERAPY INITIAL EVALUATION ADULT - PASSIVE RANGE OF MOTION EXAMINATION, REHAB EVAL
bilateral upper extremity Passive ROM was WFL (within functional limits)/Right LE Passive ROM was WFL (within functional limits)

## 2022-07-18 NOTE — PHYSICAL THERAPY INITIAL EVALUATION ADULT - GENERAL OBSERVATIONS, REHAB EVAL
Pt encountered in semisupine position, no distress, AxOX4, with +IV, and left hip dressing dry/intact. Increased left knee valgum noted. Pt agreeable to participate in PT evaluation.

## 2022-07-18 NOTE — PROGRESS NOTE ADULT - PROBLEM SELECTOR PLAN 2
-appears decompensated. Not on medications  -currently calm and re-directable  -f/u BH recs  -haldol prn for acute agitation Currently stable and not a harm to self or others. Not using any medications at home.  -currently calm and re-directable  - Qt/Qtc 398/438 ms from EKG 7/16  - haldol 5mg prn for acute agitation  - risperdal 1mg at bedtime

## 2022-07-18 NOTE — PROGRESS NOTE ADULT - SUBJECTIVE AND OBJECTIVE BOX
POST ANESTHESIA EVALUATION    56y Male POSTOP DAY 1      MENTAL STATUS: Patient participation [x  ] Awake     [  ] Arousable     [  ] Sedated    AIRWAY PATENCY: [ x ] Satisfactory  [  ] Other:     Vital Signs Last 24 Hrs  T(C): 36.9 (18 Jul 2022 05:25), Max: 36.9 (18 Jul 2022 05:25)  T(F): 98.4 (18 Jul 2022 05:25), Max: 98.4 (18 Jul 2022 05:25)  HR: 86 (18 Jul 2022 05:25) (66 - 94)  BP: 113/66 (18 Jul 2022 05:25) (110/64 - 142/80)  BP(mean): 88 (17 Jul 2022 19:00) (71 - 102)  RR: 16 (18 Jul 2022 05:25) (10 - 19)  SpO2: 100% (18 Jul 2022 05:25) (96% - 100%)    Parameters below as of 18 Jul 2022 05:25  Patient On (Oxygen Delivery Method): room air      I&O's Summary    17 Jul 2022 07:01  -  18 Jul 2022 07:00  --------------------------------------------------------  IN: 1245 mL / OUT: 1525 mL / NET: -280 mL          NAUSEA/ VOMITTING:  [ x ] NONE  [  ] CONTROLLED [  ] OTHER     PAIN: [ x ] CONTROLLED WITH CURRENT REGIMEN  [  ] OTHER    [  x] NO APPARENT ANESTHESIA COMPLICATIONS      Comments:

## 2022-07-18 NOTE — PROGRESS NOTE ADULT - PROBLEM SELECTOR PLAN 6
lovenox - Fluids: NA  - Electrolytes: Will replete to maintain K>4, Phos>3, and Mag>2  - Nutrition: regular  - Activity: with PT support  - DVT Prophylaxis: lovenox  - Stress Ulcer/GI Prophylaxis: NA  - Disposition: CLAU - Fluids: NA  - Electrolytes: Will replete to maintain K>4, Phos>3, and Mag>2  - Nutrition: regular  - Activity: with PT support  - DVT Prophylaxis: lovenox  - Stress Ulcer/GI Prophylaxis: NA  - Disposition: CLAU  - Communication: spoke with mother Tiffanie and Anahi Gallegos (caretaker) at bedside and gave updates about patient and course. Currently, they are requesting CLAU placement at [facility] which is closer to [facility]. Explained that CLAU placement would be determined by insurance eligbility and they would likely hear from  or  about them. Per Tiffanie and Anahi, future communication can be through Anahi for ease as Tiffanie is hard of hearing. Anahi Gallegos: [phone]

## 2022-07-19 LAB
ANION GAP SERPL CALC-SCNC: 8 MMOL/L — SIGNIFICANT CHANGE UP (ref 7–14)
BUN SERPL-MCNC: 18 MG/DL — SIGNIFICANT CHANGE UP (ref 7–23)
CALCIUM SERPL-MCNC: 8.4 MG/DL — SIGNIFICANT CHANGE UP (ref 8.4–10.5)
CHLORIDE SERPL-SCNC: 105 MMOL/L — SIGNIFICANT CHANGE UP (ref 98–107)
CO2 SERPL-SCNC: 27 MMOL/L — SIGNIFICANT CHANGE UP (ref 22–31)
CREAT SERPL-MCNC: 0.64 MG/DL — SIGNIFICANT CHANGE UP (ref 0.5–1.3)
EGFR: 110 ML/MIN/1.73M2 — SIGNIFICANT CHANGE UP
GLUCOSE SERPL-MCNC: 123 MG/DL — HIGH (ref 70–99)
HCT VFR BLD CALC: 25.6 % — LOW (ref 39–50)
HGB BLD-MCNC: 8 G/DL — LOW (ref 13–17)
MAGNESIUM SERPL-MCNC: 2.1 MG/DL — SIGNIFICANT CHANGE UP (ref 1.6–2.6)
MCHC RBC-ENTMCNC: 26.2 PG — LOW (ref 27–34)
MCHC RBC-ENTMCNC: 31.3 GM/DL — LOW (ref 32–36)
MCV RBC AUTO: 83.9 FL — SIGNIFICANT CHANGE UP (ref 80–100)
NRBC # BLD: 0 /100 WBCS — SIGNIFICANT CHANGE UP
NRBC # FLD: 0 K/UL — SIGNIFICANT CHANGE UP
PHOSPHATE SERPL-MCNC: 2.4 MG/DL — LOW (ref 2.5–4.5)
PLATELET # BLD AUTO: 145 K/UL — LOW (ref 150–400)
POTASSIUM SERPL-MCNC: 3.6 MMOL/L — SIGNIFICANT CHANGE UP (ref 3.5–5.3)
POTASSIUM SERPL-SCNC: 3.6 MMOL/L — SIGNIFICANT CHANGE UP (ref 3.5–5.3)
RBC # BLD: 3.05 M/UL — LOW (ref 4.2–5.8)
RBC # FLD: 14.4 % — SIGNIFICANT CHANGE UP (ref 10.3–14.5)
SODIUM SERPL-SCNC: 140 MMOL/L — SIGNIFICANT CHANGE UP (ref 135–145)
WBC # BLD: 8.74 K/UL — SIGNIFICANT CHANGE UP (ref 3.8–10.5)
WBC # FLD AUTO: 8.74 K/UL — SIGNIFICANT CHANGE UP (ref 3.8–10.5)

## 2022-07-19 PROCEDURE — 99221 1ST HOSP IP/OBS SF/LOW 40: CPT

## 2022-07-19 PROCEDURE — 99232 SBSQ HOSP IP/OBS MODERATE 35: CPT | Mod: GC

## 2022-07-19 RX ORDER — POLYETHYLENE GLYCOL 3350 17 G/17G
17 POWDER, FOR SOLUTION ORAL
Qty: 510 | Refills: 0
Start: 2022-07-19 | End: 2022-08-17

## 2022-07-19 RX ORDER — SODIUM,POTASSIUM PHOSPHATES 278-250MG
1 POWDER IN PACKET (EA) ORAL ONCE
Refills: 0 | Status: COMPLETED | OUTPATIENT
Start: 2022-07-19 | End: 2022-07-19

## 2022-07-19 RX ORDER — ACETAMINOPHEN 500 MG
3 TABLET ORAL
Qty: 90 | Refills: 0
Start: 2022-07-19 | End: 2022-07-28

## 2022-07-19 RX ORDER — RISPERIDONE 4 MG/1
1 TABLET ORAL
Qty: 0 | Refills: 0 | DISCHARGE
Start: 2022-07-19

## 2022-07-19 RX ADMIN — ENOXAPARIN SODIUM 40 MILLIGRAM(S): 100 INJECTION SUBCUTANEOUS at 17:23

## 2022-07-19 RX ADMIN — Medication 975 MILLIGRAM(S): at 13:22

## 2022-07-19 RX ADMIN — RISPERIDONE 1 MILLIGRAM(S): 4 TABLET ORAL at 21:18

## 2022-07-19 RX ADMIN — SENNA PLUS 2 TABLET(S): 8.6 TABLET ORAL at 21:18

## 2022-07-19 RX ADMIN — Medication 975 MILLIGRAM(S): at 21:18

## 2022-07-19 RX ADMIN — Medication 1 PACKET(S): at 13:23

## 2022-07-19 RX ADMIN — POLYETHYLENE GLYCOL 3350 17 GRAM(S): 17 POWDER, FOR SOLUTION ORAL at 13:24

## 2022-07-19 RX ADMIN — Medication 975 MILLIGRAM(S): at 05:36

## 2022-07-19 NOTE — PROGRESS NOTE ADULT - SUBJECTIVE AND OBJECTIVE BOX
SUBJECTIVE  No acute events overnight. Pain controlled.    OBJECTIVE  Vital Signs Last 24 Hrs  T(C): 36.8 (18 Jul 2022 21:24), Max: 36.9 (18 Jul 2022 05:25)  T(F): 98.3 (18 Jul 2022 21:24), Max: 98.4 (18 Jul 2022 05:25)  HR: 92 (18 Jul 2022 21:24) (86 - 92)  BP: 130/69 (18 Jul 2022 21:24) (109/69 - 130/69)  RR: 18 (18 Jul 2022 21:24) (16 - 18)  SpO2: 100% (18 Jul 2022 21:24) (100% - 100%)  Parameters below as of 18 Jul 2022 21:24  Patient On (Oxygen Delivery Method): room air      PHYSICAL EXAM  Gen: Lying in bed, NAD  Resp: No increased WOB  LLE:  Dressings c/d/i, compartments soft, no calf TTP b/l  Motor: TA/EHL/GS/FHL intact  Sensory: DP/SP/Tib/Inna/Saph SILT  +DP pulse, WWP    LABS                          8.2    14.04 )-----------( 116      ( 18 Jul 2022 05:25 )             26.1                07-18    138  |  104  |  22  ----------------------------<  110<H>  3.9   |  25  |  0.79    Ca    8.2<L>      18 Jul 2022 05:25  Phos  2.3     07-18  Mg     2.20     07-18    TPro  6.8  /  Alb  3.6  /  TBili  0.6  /  DBili  0.2  /  AST  56<H>  /  ALT  51<H>  /  AlkPhos  46  07-17      ASSESSMENT & PLAN  56yMale s/p L femoral IMN on 7/17/22.  -WBAT LLE  -pain control  -ice/cold compress  -incentive spirometry  -DVT ppx: Lovenox (when discharged, please send out on aspirin 325 BID for 4-6 weeks)  -PT/OT

## 2022-07-19 NOTE — PROGRESS NOTE ADULT - PROBLEM SELECTOR PLAN 3
- WBC 14.04 this AM likely reactive 2/2 surgery  - currently not endorsing any symptoms of infection  - incentive spirometer at bedside and patient educated on use -mild thrombocytopenia noted  -B12 and folate wnl  - appears to be improved with platelets 145 today

## 2022-07-19 NOTE — PROGRESS NOTE ADULT - SUBJECTIVE AND OBJECTIVE BOX
Dalia Singleton  PGY-1 Resident Physician   Pager [phone]/ [phone]    Patient is a 56y old  Male who presents with a chief complaint of hip pain (19 Jul 2022 14:45)      SUBJECTIVE / OVERNIGHT EVENTS:  Patient seen and evaluated at bedside.  patient has no complaints this morning. Says he is feeling well; says pain is well controlled. Asked about when PT would be able to work with him; advised him that the schedule is usually every other day.   Denies any fevers, chills, CP, or SOB.    Vital Signs Last 24 Hrs  T(C): 37.2 (19 Jul 2022 13:00), Max: 37.2 (19 Jul 2022 13:00)  T(F): 98.9 (19 Jul 2022 13:00), Max: 98.9 (19 Jul 2022 13:00)  HR: 72 (19 Jul 2022 13:00) (72 - 92)  BP: 112/60 (19 Jul 2022 13:00) (112/60 - 131/69)  BP(mean): --  RR: 17 (19 Jul 2022 13:00) (17 - 18)  SpO2: 100% (19 Jul 2022 13:00) (100% - 100%)    Parameters below as of 19 Jul 2022 13:00  Patient On (Oxygen Delivery Method): room air        PHYSICAL EXAM:  GENERAL: NAD, well-developed  CHEST/LUNG: Clear to auscultation bilaterally; No wheeze  HEART: Regular rate and rhythm; Normal S1 S2, No murmurs, rubs, or gallops  ABDOMEN: Soft, Nontender, Nondistended; Bowel sounds present  EXTREMITIES:  L leg generally larger than R leg with bandage on lateral thigh. Generally, not warm or erythematous appearing. Has scrape wound on R knee from fall; currently wrapped ; not draining blood or pus.   PSYCH: AAOx3    LABS:                        8.0    8.74  )-----------( 145      ( 19 Jul 2022 06:00 )             25.6     Hgb Trend: 8.0<--, 8.2<--, 9.7<--, 9.4<--, 10.3<--  07-19    140  |  105  |  18  ----------------------------<  123<H>  3.6   |  27  |  0.64    Ca    8.4      19 Jul 2022 06:00  Phos  2.4     07-19  Mg     2.10     07-19      Creatinine Trend: 0.64<--, 0.79<--, 0.73<--, 0.73<--, 0.93<--

## 2022-07-19 NOTE — PROGRESS NOTE ADULT - PROBLEM SELECTOR PLAN 6
- Fluids: NA  - Electrolytes: Will replete to maintain K>4, Phos>3, and Mag>2  - Nutrition: regular  - Activity: with PT support  - DVT Prophylaxis: lovenox  - Stress Ulcer/GI Prophylaxis: NA  - Disposition: CLAU  - Communication: spoke with mother Tiffanie and Anahi Gallegos (caretaker) at bedside and gave updates about patient and course. Currently, they are requesting CLAU placement at [facility] which is closer to [facility]. Explained that CLAU placement would be determined by insurance eligbility and they would likely hear from  or  about them. Per Tiffanie and Anahi, future communication can be through Anahi for ease as Tiffanie is hard of hearing. Anahi Gallegos: [phone]

## 2022-07-19 NOTE — PROGRESS NOTE ADULT - PROBLEM SELECTOR PLAN 4
-mild thrombocytopenia noted  -B12 and folate wnl  -check peripheral smear if continues to downtrend  -hold aspirin for now - Fluids: NA  - Electrolytes: Will replete to maintain K>4, Phos>3, and Mag>2  - Nutrition: regular  - Activity: with PT support  - DVT Prophylaxis: lovenox  - Stress Ulcer/GI Prophylaxis: NA  - Disposition: acute rehab

## 2022-07-19 NOTE — PROGRESS NOTE ADULT - PROBLEM SELECTOR PLAN 1
-pt p/w acute left hip fracture. Initially pt refusing surgery; then agreeable and spoke with mother. IMN on 7/17  - c/w pain control  - c/w bowel regimen  - dvt ppx - lovenox  - pending OT recs  - PMNR suggesting rehab -pt p/w acute left hip fracture. Initially pt refusing surgery; then agreeable and spoke with mother. IMN on 7/17  - c/w pain control  - c/w bowel regimen  - dvt ppx - lovenox  - pending OT recs  - PMNR suggesting acute rehab

## 2022-07-19 NOTE — PROGRESS NOTE ADULT - PROBLEM SELECTOR PLAN 2
Currently stable and not a harm to self or others. Not using any medications at home.  -currently calm and re-directable  - Qt/Qtc 398/438 ms from EKG 7/16  - haldol 5mg prn for acute agitation  - risperdal 1mg at bedtime Currently stable and not a harm to self or others. Not using any medications at home.  -currently calm and re-directable  - Qt/Qtc 398/438 ms from EKG 7/16  - haldol 5mg prn for acute agitation  - c/w risperdal 1mg at bedtime

## 2022-07-19 NOTE — PROGRESS NOTE ADULT - PROBLEM SELECTOR PLAN 5
-no prior labs for comparison  -alcohol use? Pt denies but poor historian, twice denied  - Hep panel negative  - downtrending AST/ALT

## 2022-07-19 NOTE — CONSULT NOTE ADULT - SUBJECTIVE AND OBJECTIVE BOX
Wound SURGERY CONSULT NOTE    HPI:55 y/o M w PMH of paranoid schizophrenia (with multiple hospitalizations) presents with leg pain since Wednesday. Pt was gardening on Wednesday and fell while pulling on a vine.  He fell on his left side and has since been unable to ambulate on left leg. In the same accident, pt also scraped his right knee. The pain continued to progress, so he called EMS. In the fall, pt did not hit his head. Denies LOC. Prior to incident, he was able to climb stairs and perform ADLs without chest pain or SOB. Denies cardiac history. Denies A/V hallucinations. Denies SI/HI. Extensive discussion with pt regarding hip fracture seen on xray and need for emergent surgery. He did not believe he had a fracture and instead stated "It is a tendon strain, I’ve had this before. I’ll wait two weeks and get a second opinion before pursing surgery." He was not able to repeat back to me the risks involved with delaying surgery. On exam he was circumferential and tangential, and with possibly paranoid thinking. When asked if I could reach out to his mother (next of kin), he gave me permission but stated "I’m in an active case against her for all she has done to me." He asked I reach out to his step uncle Brett Wadsworth but was unable to provide a number to get in contact with him. When asked about schizophrenia diagnosis, he states it was a misdiagnosis and he is not taking medications at the time. I attempted to reach out to his mother Tiffanie without response. As per prior documentation, she is hard of hearing and only able to communicate via email/text (16 Jul 2022 10:02).     Wound consult requested to assist w/ management of right knee trauma wound s/p fall on Wednesday. Patient reports bilateral elbow abrasions " now healed" Patient requesting an antiseptic for the elbows. Patient educated that elbow abrasions now healed and advised to keep clean and wash with soap and water. Patient verbalized understanding. Patient denies pain to right knee. Reports inability to walk. Denies pain to right knee.     Current Diet: Diet, Regular (07-17-22 @ 20:13)    PAST MEDICAL & SURGICAL HISTORY:  Paranoid schizophrenia      Bipolar 1 disorder      S/P tonsillectomy    REVIEW OF SYSTEMS: General/ Skin/: see HPI  All other systems negative    MEDICATIONS  (STANDING):  acetaminophen     Tablet .. 975 milliGRAM(s) Oral every 8 hours  enoxaparin Injectable 40 milliGRAM(s) SubCutaneous every 24 hours  polyethylene glycol 3350 17 Gram(s) Oral daily  risperiDONE   Tablet 1 milliGRAM(s) Oral at bedtime  senna 2 Tablet(s) Oral at bedtime    MEDICATIONS  (PRN):  aluminum hydroxide/magnesium hydroxide/simethicone Suspension 30 milliLiter(s) Oral every 4 hours PRN Dyspepsia  haloperidol     Tablet 5 milliGRAM(s) Oral every 6 hours PRN agitation  melatonin 3 milliGRAM(s) Oral at bedtime PRN Insomnia  ondansetron Injectable 4 milliGRAM(s) IV Push every 8 hours PRN Nausea and/or Vomiting  oxyCODONE    IR 10 milliGRAM(s) Oral every 4 hours PRN Severe Pain (7 - 10)  oxyCODONE    IR 5 milliGRAM(s) Oral every 4 hours PRN Moderate Pain (4 - 6)      Allergies    No Known Allergies    Intolerances    SOCIAL HISTORY: lives with mother. Patient denies smoking     FAMILY HISTORY:  FH: deafness or hearing loss (Mother)    PHYSICAL EXAM:  Vital Signs Last 24 Hrs  T(C): 37.2 (19 Jul 2022 13:00), Max: 37.2 (19 Jul 2022 13:00)  T(F): 98.9 (19 Jul 2022 13:00), Max: 98.9 (19 Jul 2022 13:00)  HR: 72 (19 Jul 2022 13:00) (72 - 92)  BP: 112/60 (19 Jul 2022 13:00) (112/60 - 131/69)  BP(mean): --  RR: 17 (19 Jul 2022 13:00) (17 - 18)  SpO2: 100% (19 Jul 2022 13:00) (100% - 100%)  BMI 22/3 kg/m2 (18 Jul 2022)  Parameters below as of 19 Jul 2022 13:00  Patient On (Oxygen Delivery Method): room air    Constitutional: NAD/ A&Ox3/ Alert  (+) low airloss support surface, (+) fluidized positioning devices, (+) complete cair boots   HEENT:  NC/AT, non icteric, mucosa moist, throat clear, trachea midline, neck supple  Cardiovascular: Rate regular   Respiratory: Equal chest expansion, non labored   Gastrointestinal soft   : wnl   Neurology: able to follow commands   Musculoskeletal:  limited FROM, no deformities/ contractures. Left thigh/knee with increase edema and ecchymosis. Left trochanter with intact dressing s/p  Intramedullary nailing of left femur on 7/17 by ortho.   Vascular: BLE equally warm, no cyanosis, clubbing, edema  DP/PT 2+ pulses palpable  no overt ischemia noted  Skin:  moist w/ good turgor  Bilateral elbows with healed skin abrasions, no open ulcers, no drainage.   Right knee trauma wound: 8cmx4.5cmx0.2cm. Unable to determine true anatomical depth secondary to necrotic tissue. Bone in close proximity, not visualized. Tissue type presenting as 70% thin yellow moist slough firmly attach and 30% pink dermis. Scant serous drainage. No odor. Periwound skin with hyperpigmentation. Not associated cellulitis. No increased warmth, no erythema. no induration- Medihoney and silicone foam dressing applied.   Back: wnl, risks for moisture associated dermatitis   Psych: calm, cooperative   LABS/ CULTURES/ RADIOLOGY:                        8.0    8.74  )-----------( 145      ( 19 Jul 2022 06:00 )             25.6       140  |  105  |  18  ----------------------------<  123      [07-19-22 @ 06:00]  3.6   |  27  |  0.64        Ca     8.4     [07-19-22 @ 06:00]      Mg     2.10     [07-19-22 @ 06:00]      Phos  2.4     [07-19-22 @ 06:00]    < from: Xray Knee 1 or 2 Views, Bilateral (07.16.22 @ 05:07) >  ACC: [id number] EXAM:  XR KNEE 1-2 VIEWS  BI                        ACC: [id number] EXAM:  XR FEMUR 2 VIEWS BI                        ACC: [id number] EXAM:  XR HIPS BI WITH PELV 2V                          PROCEDURE DATE:  07/16/2022      INTERPRETATION:  CLINICAL INFORMATION: Fall, left hip pain, left lower   extremity pain.    EXAM: One view of the pelvis, one view of the left hip, 2 views of the   bilateral femora, 3 views of the bilateral knees    COMPARISON: No similar prior studies available for comparison.    IMPRESSION:  Acute comminuted intertrochanteric fracture of the LEFT proximal femur.    --- End of Report ---        < end of copied text >

## 2022-07-20 DIAGNOSIS — D64.9 ANEMIA, UNSPECIFIED: ICD-10-CM

## 2022-07-20 LAB
ALBUMIN SERPL ELPH-MCNC: 3.2 G/DL — LOW (ref 3.3–5)
ALP SERPL-CCNC: 44 U/L — SIGNIFICANT CHANGE UP (ref 40–120)
ALT FLD-CCNC: 31 U/L — SIGNIFICANT CHANGE UP (ref 4–41)
ANION GAP SERPL CALC-SCNC: 8 MMOL/L — SIGNIFICANT CHANGE UP (ref 7–14)
AST SERPL-CCNC: 29 U/L — SIGNIFICANT CHANGE UP (ref 4–40)
BILIRUB SERPL-MCNC: 0.8 MG/DL — SIGNIFICANT CHANGE UP (ref 0.2–1.2)
BLD GP AB SCN SERPL QL: NEGATIVE — SIGNIFICANT CHANGE UP
BUN SERPL-MCNC: 17 MG/DL — SIGNIFICANT CHANGE UP (ref 7–23)
CALCIUM SERPL-MCNC: 8.8 MG/DL — SIGNIFICANT CHANGE UP (ref 8.4–10.5)
CHLORIDE SERPL-SCNC: 103 MMOL/L — SIGNIFICANT CHANGE UP (ref 98–107)
CO2 SERPL-SCNC: 27 MMOL/L — SIGNIFICANT CHANGE UP (ref 22–31)
CREAT SERPL-MCNC: 0.65 MG/DL — SIGNIFICANT CHANGE UP (ref 0.5–1.3)
EGFR: 110 ML/MIN/1.73M2 — SIGNIFICANT CHANGE UP
FERRITIN SERPL-MCNC: 217 NG/ML — SIGNIFICANT CHANGE UP (ref 30–400)
FOLATE SERPL-MCNC: 9.9 NG/ML — SIGNIFICANT CHANGE UP (ref 3.1–17.5)
GLUCOSE SERPL-MCNC: 103 MG/DL — HIGH (ref 70–99)
HCT VFR BLD CALC: 25.3 % — LOW (ref 39–50)
HCT VFR BLD CALC: 27.1 % — LOW (ref 39–50)
HGB BLD-MCNC: 7.8 G/DL — LOW (ref 13–17)
HGB BLD-MCNC: 8.4 G/DL — LOW (ref 13–17)
IRON SATN MFR SERPL: 21 % — SIGNIFICANT CHANGE UP (ref 14–50)
IRON SATN MFR SERPL: 43 UG/DL — LOW (ref 45–165)
IRON SATN MFR SERPL: 45 UG/DL — SIGNIFICANT CHANGE UP (ref 45–165)
MAGNESIUM SERPL-MCNC: 2 MG/DL — SIGNIFICANT CHANGE UP (ref 1.6–2.6)
MCHC RBC-ENTMCNC: 26 PG — LOW (ref 27–34)
MCHC RBC-ENTMCNC: 26.5 PG — LOW (ref 27–34)
MCHC RBC-ENTMCNC: 30.8 GM/DL — LOW (ref 32–36)
MCHC RBC-ENTMCNC: 31 GM/DL — LOW (ref 32–36)
MCV RBC AUTO: 83.9 FL — SIGNIFICANT CHANGE UP (ref 80–100)
MCV RBC AUTO: 86.1 FL — SIGNIFICANT CHANGE UP (ref 80–100)
NRBC # BLD: 0 /100 WBCS — SIGNIFICANT CHANGE UP
NRBC # BLD: 0 /100 WBCS — SIGNIFICANT CHANGE UP
NRBC # FLD: 0 K/UL — SIGNIFICANT CHANGE UP
NRBC # FLD: 0 K/UL — SIGNIFICANT CHANGE UP
PHOSPHATE SERPL-MCNC: 3.2 MG/DL — SIGNIFICANT CHANGE UP (ref 2.5–4.5)
PLATELET # BLD AUTO: 169 K/UL — SIGNIFICANT CHANGE UP (ref 150–400)
PLATELET # BLD AUTO: 210 K/UL — SIGNIFICANT CHANGE UP (ref 150–400)
POTASSIUM SERPL-MCNC: 4.3 MMOL/L — SIGNIFICANT CHANGE UP (ref 3.5–5.3)
POTASSIUM SERPL-SCNC: 4.3 MMOL/L — SIGNIFICANT CHANGE UP (ref 3.5–5.3)
PROT SERPL-MCNC: 5.9 G/DL — LOW (ref 6–8.3)
RBC # BLD: 2.94 M/UL — LOW (ref 4.2–5.8)
RBC # BLD: 3.23 M/UL — LOW (ref 4.2–5.8)
RBC # FLD: 14.7 % — HIGH (ref 10.3–14.5)
RBC # FLD: 14.8 % — HIGH (ref 10.3–14.5)
RH IG SCN BLD-IMP: POSITIVE — SIGNIFICANT CHANGE UP
SODIUM SERPL-SCNC: 138 MMOL/L — SIGNIFICANT CHANGE UP (ref 135–145)
TIBC SERPL-MCNC: 211 UG/DL — LOW (ref 220–430)
UIBC SERPL-MCNC: 166 UG/DL — SIGNIFICANT CHANGE UP (ref 110–370)
VIT B12 SERPL-MCNC: 1004 PG/ML — HIGH (ref 200–900)
WBC # BLD: 8.54 K/UL — SIGNIFICANT CHANGE UP (ref 3.8–10.5)
WBC # BLD: 9.91 K/UL — SIGNIFICANT CHANGE UP (ref 3.8–10.5)
WBC # FLD AUTO: 8.54 K/UL — SIGNIFICANT CHANGE UP (ref 3.8–10.5)
WBC # FLD AUTO: 9.91 K/UL — SIGNIFICANT CHANGE UP (ref 3.8–10.5)

## 2022-07-20 PROCEDURE — 99232 SBSQ HOSP IP/OBS MODERATE 35: CPT | Mod: GC

## 2022-07-20 PROCEDURE — 99232 SBSQ HOSP IP/OBS MODERATE 35: CPT

## 2022-07-20 PROCEDURE — 99233 SBSQ HOSP IP/OBS HIGH 50: CPT

## 2022-07-20 RX ORDER — POLYETHYLENE GLYCOL 3350 17 G/17G
17 POWDER, FOR SOLUTION ORAL
Refills: 0 | Status: DISCONTINUED | OUTPATIENT
Start: 2022-07-20 | End: 2022-08-08

## 2022-07-20 RX ADMIN — Medication 975 MILLIGRAM(S): at 06:17

## 2022-07-20 RX ADMIN — SENNA PLUS 2 TABLET(S): 8.6 TABLET ORAL at 21:40

## 2022-07-20 RX ADMIN — POLYETHYLENE GLYCOL 3350 17 GRAM(S): 17 POWDER, FOR SOLUTION ORAL at 17:22

## 2022-07-20 RX ADMIN — Medication 975 MILLIGRAM(S): at 21:40

## 2022-07-20 RX ADMIN — Medication 975 MILLIGRAM(S): at 13:33

## 2022-07-20 RX ADMIN — RISPERIDONE 1 MILLIGRAM(S): 4 TABLET ORAL at 21:39

## 2022-07-20 NOTE — PROGRESS NOTE ADULT - PROBLEM SELECTOR PLAN 2
Currently stable and not a harm to self or others. Not using any medications at home.  -currently calm and re-directable  - Qt/Qtc 398/438 ms from EKG 7/16  - haldol 5mg prn for acute agitation  - c/w risperdal 1mg at bedtime; has been compliant with risperdal since starting this medication inpatient Patient with downtrending Hgb 7.8 this AM in the setting of recent L IMN 7/17 and hgb 10.3 on presentation  - Patient with downtrending Hgb 7.8 this AM in the setting of recent L IMN 7/17 and hgb 10.3 on presentation.   likely 2/2 post operative hypovolemic status 2/2 anesthesia agents. Low suspicion for acute bleed at this time as patient has no symptoms   - trend CBC, active T&S  - f/u iron studies; would recommend screening colonoscopy on dc regardless of iron study results  - leg exam: negative for pain, poikilothermia, paresthesia, pulselessness and pallor. at this time, low concern for acute compartment symptom of the leg  - monitor  - transfuse if Hgb < 7; consent in chart

## 2022-07-20 NOTE — BH CONSULTATION LIAISON PROGRESS NOTE - NSBHFUPINTERVALHXFT_PSY_A_CORE
VSS, no PRNs overnight, no acute events. Patient compliant with standing medications.    Patient states that he feels fine, says his leg is feeling better after he tore his ligaments. He says that the [facility] courthouse nominated him as a plantiff, and said that talking with representatives there would be beneficial. He states that medical team made comments to him that were false, and that talking with the courthouse would help sort things out. Otherwise, he agrees with medical team's decision to go to rehab after d/c, says he is being treated well. He states he wants a list of rehab facilities. He said he feels well rested even though he is not getting much sleep (unable to quantify hours), as his sleep is interrupted in the hospital. He denies being on Risperdal, but says that the medications he is getting at calming and help him not get agitated. He denies AVH, SI/HI, paranoia, depression, anxiety.

## 2022-07-20 NOTE — PROGRESS NOTE ADULT - PROBLEM SELECTOR PLAN 1
-pt p/w acute left hip fracture. Initially pt refusing surgery; then agreeable and spoke with mother. IMN on 7/17  - c/w pain control  - c/w bowel regimen  - incentive spirometer at bedside and patient endorses using regularly  - dvt ppx - lovenox  - PT, OT and PMNR suggesting acute rehab   - pending placement -pt p/w acute left hip fracture. Initially pt refusing surgery; then agreeable and spoke with mother. IMN on 7/17  - c/w pain control  - c/w bowel regimen  - incentive spirometer at bedside and patient endorses using regularly  - dvt ppx - lovenox  - R knee scrape evaluated by wound care and currently dressed with recommendations for care  - PT, OT and PMNR suggesting acute rehab   - pending placement  - will send with aspirin 325 BID for 4-6 weeks on dc -pt p/w acute left hip fracture. Initially pt refusing surgery; then agreeable and spoke with mother. IMN on 7/17  - c/w pain control  - c/w bowel regimen  - incentive spirometer at bedside and patient endorses using regularly  - dvt ppx - lovenox  - R knee scrape evaluated by wound care and currently dressed with recommendations for care  - PT, OT and PMNR suggesting acute rehab   - pending placement; complicated by lack of insurance  - will send with aspirin 325 BID for 4-6 weeks on dc

## 2022-07-20 NOTE — PROGRESS NOTE ADULT - PROBLEM SELECTOR PLAN 5
- Fluids: NA  - Electrolytes: Will replete to maintain K>4, Phos>3, and Mag>2  - Nutrition: regular  - Activity: with PT support  - DVT Prophylaxis: lovenox  - Stress Ulcer/GI Prophylaxis: NA  - Disposition: acute rehab - Fluids: NA  - Electrolytes: Will replete to maintain K>4, Phos>3, and Mag>2  - Nutrition: regular  - Activity: with PT support  - DVT Prophylaxis: lovenox  - Stress Ulcer/GI Prophylaxis: NA  - Disposition: acute rehab  - Communication: spoke with mother Tiffanie and caretaker Anahi at bedside this afternoon. Explained updates in terms of pending rehab placement and that patient did not have insurance so process may take time. Family states that Medicaid card was given to ZACHARY (unsure if plan is active). Additionally, was able to consent Tiffanie for blood transfusion is necessary. Consent signed and in patient chart.

## 2022-07-20 NOTE — PROGRESS NOTE ADULT - SUBJECTIVE AND OBJECTIVE BOX
Patient is a 56y old  Male who presents with a chief complaint of hip pain (20 Jul 2022 07:30)     Interval history: per team patient does not have insurance.    Patient now on Risperdal per behavioral health.  States pain improving. Tolerated bedside PT yesterday, on schedule for today. Reports constipation.  Reported mild light headedness with standing yesterday.    REVIEW OF SYSTEMS  +L hip pain controlled  +constipation    PAST MEDICAL & SURGICAL HISTORY  Paranoid schizophrenia    Bipolar 1 disorder    No significant past surgical history    S/P tonsillectomy         CURRENT FUNCTIONAL STATUS  7/19  Bed Mobility  Bed Mobility Training Rehab Potential: good, to achieve stated therapy goals  Bed Mobility Training Sit-to-Supine: minimum assist (75% patient effort);  1 person assist;  nonverbal cues (demo/gestures);  verbal cues;  bed rails;  HOB elevated   Bed Mobility Training Limitations: impaired ability to control trunk for mobility;  decreased ability to use legs for bridging/pushing;  decreased flexibility;  decreased ROM;  abnormal muscle tone;  decreased strength;  impaired balance;  impaired coordination;  impaired postural control;  impaired motor control    Sit-Stand Transfer Training  Sit-to-Stand Transfer Training Rehab Potential: good, to achieve stated therapy goals  Sit-to-Stand Transfer Training Symptoms Noted During/After Treatment: fatigue  Transfer Training Sit-to-Stand Transfer: minimum assist (75% patient effort);  1 person assist;  nonverbal cues (demo/gestures);  verbal cues;  left LE WBAT   rolling walker  Transfer Training Stand-to-Sit Transfer: minimum assist (75% patient effort);  1 person assist;  nonverbal cues (demo/gestures);  verbal cues;  left LE WBAT   rolling walker  Sit-to-Stand Transfer Training Transfer Safety Analysis: decreased balance;  decreased sequencing ability;  decreased weight-shifting ability;  abnormal muscle tone;  decreased flexibility;  decreased ROM;  decreased strength;  impaired balance;  impaired coordination;  impaired motor control;  impaired postural control;  excessive left knee valgus    Gait Training  Gait Training Rehab Potential: fair, will monitor progress closely  Gait Training Symptoms Noted During/After Treatment: fatigue  Gait Training: moderate assist (50% patient effort);  1 person assist;  verbal cues;  nonverbal cues (demo/gestures);  left LE WBAT   rolling walker;  bed to chair ~3ft; 3 lateral steps along EOB  Gait Analysis: decreased jamal;  crouch;  decreased hip/knee flexion;  decreased velocity of limb motion;  shuffling;  decreased step length;  decreased stride length;  decreased weight-shifting ability;  shuffles left foot along floor for foot advancement; lack of left hip and knee flexion; poor weight shifting onto right LE; forward flexed posture;  abnormal muscle tone;  decreased flexibility;  decreased ROM;  decreased strength;  impaired balance;  impaired coordination;  impaired motor control;  impaired postural control  Gait Number of Times:: x 2  Type of Rest Type of Rest: sitting        FAMILY HISTORY    FH: deafness or hearing loss (Mother)        RECENT LABS/IMAGING  CBC Full  -  ( 20 Jul 2022 06:30 )  WBC Count : 8.54 K/uL  RBC Count : 2.94 M/uL  Hemoglobin : 7.8 g/dL  Hematocrit : 25.3 %  Platelet Count - Automated : 169 K/uL  Mean Cell Volume : 86.1 fL  Mean Cell Hemoglobin : 26.5 pg  Mean Cell Hemoglobin Concentration : 30.8 gm/dL  Auto Neutrophil # : x  Auto Lymphocyte # : x  Auto Monocyte # : x  Auto Eosinophil # : x  Auto Basophil # : x  Auto Neutrophil % : x  Auto Lymphocyte % : x  Auto Monocyte % : x  Auto Eosinophil % : x  Auto Basophil % : x    07-20    138  |  103  |  17  ----------------------------<  103<H>  4.3   |  27  |  0.65    Ca    8.8      20 Jul 2022 06:30  Phos  3.2     07-20  Mg     2.00     07-20    TPro  5.9<L>  /  Alb  3.2<L>  /  TBili  0.8  /  DBili  x   /  AST  29  /  ALT  31  /  AlkPhos  44  07-20        VITALS  T(C): 36.7 (07-20-22 @ 05:10), Max: 37.2 (07-19-22 @ 13:00)  HR: 82 (07-20-22 @ 05:10) (72 - 86)  BP: 115/67 (07-20-22 @ 05:10) (112/60 - 116/65)  RR: 18 (07-20-22 @ 05:10) (16 - 18)  SpO2: 98% (07-20-22 @ 05:10) (98% - 100%)  Wt(kg): --    ALLERGIES  No Known Allergies      MEDICATIONS   acetaminophen     Tablet .. 975 milliGRAM(s) Oral every 8 hours  aluminum hydroxide/magnesium hydroxide/simethicone Suspension 30 milliLiter(s) Oral every 4 hours PRN  bisacodyl Suppository 10 milliGRAM(s) Rectal daily  enoxaparin Injectable 40 milliGRAM(s) SubCutaneous every 24 hours  haloperidol     Tablet 5 milliGRAM(s) Oral every 6 hours PRN  melatonin 3 milliGRAM(s) Oral at bedtime PRN  ondansetron Injectable 4 milliGRAM(s) IV Push every 8 hours PRN  oxyCODONE    IR 10 milliGRAM(s) Oral every 4 hours PRN  oxyCODONE    IR 5 milliGRAM(s) Oral every 4 hours PRN  polyethylene glycol 3350 17 Gram(s) Oral two times a day  risperiDONE   Tablet 1 milliGRAM(s) Oral at bedtime  senna 2 Tablet(s) Oral at bedtime      ----------------------------------------------------------------------------------------   PHYSICAL EXAM  Constitutional - NAD, Comfortable  HEENT - NCAT, EOMI   Chest - no respiratory distress  Cardiovascular - RRR, S1S2   Abdomen -  Soft, NTND  Extremities - + dressing intact L hip, No calf tenderness   Neurologic Exam -                    Cognitive - Awake, Alert, AAO to self, place, date, year, situation     Communication - Fluent, No dysarthria     Cranial Nerves - CN 2-12 intact     Motor -                      LEFT    UE - ShAB 5/5, EF 5/5, EE 5/5, WE 5/5,  5/5                    RIGHT UE - ShAB 5/5, EF 5/5, EE 5/5, WE 5/5,  5/5                    LEFT    LE - HF 2-/5, KE 1/5, DF 5/5, PF 5/5                       RIGHT LE - HF 5/5, KE 5/5, DF 5/5, PF 5/5        Sensory - Intact to LT      Balance - WNL Static  Psychiatric - Mood stable, Affect WNL  ----------------------------------------------------------------------------------------  ASSESSMENT/PLAN   56 year old male s/p L femoral IMN  paranoia appears improved, started Risperdal 2 days ago.   patient reports he is working with bedside therapy, hopes to go to rehab soon.   WBAT LLE  Pain - oxy ir prn, tylenol, ice prn L hip  Hg trending down, monitor, possible transfusion planned per team.  bowel regimen, treatment for constipation  DVT PPX - lovenox  Diet: regular  continue bedside PT and OT   Rehab -recommend acute inpatient rehab when medically cleared, however per team patient may have insurance barrier due to lack of insurance.  Will monitor for improvement during admission.

## 2022-07-20 NOTE — PROGRESS NOTE ADULT - SUBJECTIVE AND OBJECTIVE BOX
SUBJECTIVE  No acute events overnight. Pain controlled.    OBJECTIVE  Vital Signs Last 24 Hrs  T(C): 36.9 (19 Jul 2022 21:19), Max: 37.2 (19 Jul 2022 13:00)  T(F): 98.4 (19 Jul 2022 21:19), Max: 98.9 (19 Jul 2022 13:00)  HR: 86 (19 Jul 2022 21:19) (72 - 86)  BP: 116/65 (19 Jul 2022 21:19) (112/60 - 131/69)  RR: 16 (19 Jul 2022 21:19) (16 - 18)  SpO2: 99% (19 Jul 2022 21:19) (99% - 100%)  Parameters below as of 19 Jul 2022 21:19  Patient On (Oxygen Delivery Method): room air      PHYSICAL EXAM  Gen: Lying in bed, NAD  Resp: No increased WOB  LLE:  Dressings c/d/i, compartments soft, no calf TTP b/l  Motor: TA/EHL/GS/FHL intact  Sensory: DP/SP/Tib/Inna/Saph SILT  +DP pulse, WWP    LABS                          8.0    8.74  )-----------( 145      ( 19 Jul 2022 06:00 )             25.6                         8.2    14.04 )-----------( 116      ( 18 Jul 2022 05:25 )             26.1              07-19    140  |  105  |  18  ----------------------------<  123<H>  3.6   |  27  |  0.64    Ca    8.4      19 Jul 2022 06:00  Phos  2.4     07-19  Mg     2.10     07-19      ASSESSMENT & PLAN  56yMale s/p L femoral IMN on 7/17/22.  -WBAT LLE  -pain control  -ice/cold compress  -incentive spirometry  -DVT ppx: Lovenox (when discharged, please send out on aspirin 325 BID for 4-6 weeks)  -PT/OT

## 2022-07-20 NOTE — PROGRESS NOTE ADULT - PROBLEM SELECTOR PLAN 3
-mild thrombocytopenia noted  -B12 and folate wnl  - appears to be improved with platelets 145 today -mild thrombocytopenia noted on admission   -B12 and folate wnl  - platelets 169 today  - resolved Currently stable and not a harm to self or others. Not using any medications at home.  -currently calm and re-directable  - Qt/Qtc 398/438 ms from EKG 7/16  - haldol 5mg prn for acute agitation  - c/w risperdal 1mg at bedtime; has been compliant with risperdal since starting this medication inpatient

## 2022-07-20 NOTE — PROGRESS NOTE ADULT - PROBLEM SELECTOR PLAN 4
- Fluids: NA  - Electrolytes: Will replete to maintain K>4, Phos>3, and Mag>2  - Nutrition: regular  - Activity: with PT support  - DVT Prophylaxis: lovenox  - Stress Ulcer/GI Prophylaxis: NA  - Disposition: acute rehab -mild thrombocytopenia noted on admission   -B12 and folate wnl  - platelets 169 today  - resolved

## 2022-07-20 NOTE — PROGRESS NOTE ADULT - SUBJECTIVE AND OBJECTIVE BOX
Dalia Singleton  PGY-1 Resident Physician   Pager [phone]/ [phone]    Patient is a 56y old  Male who presents with a chief complaint of hip pain (20 Jul 2022 07:30)      SUBJECTIVE / OVERNIGHT EVENTS:  Patient seen and evaluated at bedside.  Patient has no complaints this morning. Says he is feeling well; pain is well controlled. Says he feels like he is improving by 2% every day. Says he was using incentive spirometer yesterday but hasn't used it today; says he plans to use it later today.  Denies any fevers, chills, CP, or SOB.    Vital Signs Last 24 Hrs  T(C): 36.7 (20 Jul 2022 05:10), Max: 37.2 (19 Jul 2022 13:00)  T(F): 98 (20 Jul 2022 05:10), Max: 98.9 (19 Jul 2022 13:00)  HR: 82 (20 Jul 2022 05:10) (72 - 86)  BP: 115/67 (20 Jul 2022 05:10) (112/60 - 116/65)  BP(mean): --  RR: 18 (20 Jul 2022 05:10) (16 - 18)  SpO2: 98% (20 Jul 2022 05:10) (98% - 100%)    Parameters below as of 20 Jul 2022 05:10  Patient On (Oxygen Delivery Method): room air        PHYSICAL EXAM:  GENERAL: NAD, well-developed  CHEST/LUNG: Clear to auscultation bilaterally; No wheeze  HEART: Regular rate and rhythm; Normal S1 S2, No murmurs, rubs, or gallops  ABDOMEN: Soft, Nontender, Nondistended; Bowel sounds present  EXTREMITIES:  L leg generally larger than R leg with bandage on lateral thigh. Generally, not warm or erythematous appearing. Has scrape wound on R knee from fall; currently wrapped after wound care consult  PSYCH: AAOx3    LABS:                        7.8    8.54  )-----------( 169      ( 20 Jul 2022 06:30 )             25.3     Hgb Trend: 7.8<--, 8.0<--, 8.2<--, 9.7<--, 9.4<--  07-20    138  |  103  |  17  ----------------------------<  103<H>  4.3   |  27  |  0.65    Ca    8.8      20 Jul 2022 06:30  Phos  3.2     07-20  Mg     2.00     07-20    TPro  5.9<L>  /  Alb  3.2<L>  /  TBili  0.8  /  DBili  x   /  AST  29  /  ALT  31  /  AlkPhos  44  07-20    Creatinine Trend: 0.65<--, 0.64<--, 0.79<--, 0.73<--, 0.73<--, 0.93<--  LIVER FUNCTIONS - ( 20 Jul 2022 06:30 )  Alb: 3.2 g/dL / Pro: 5.9 g/dL / ALK PHOS: 44 U/L / ALT: 31 U/L / AST: 29 U/L / GGT: x                  Dalia Singleton  PGY-1 Resident Physician   Pager [phone]/ [phone]    Patient is a 56y old  Male who presents with a chief complaint of hip pain (20 Jul 2022 07:30)      SUBJECTIVE / OVERNIGHT EVENTS:  Patient seen and evaluated at bedside.  Patient has no complaints this morning. Says he is feeling well; pain is well controlled. Says he feels like he is improving by 2% every day. Says he was using incentive spirometer yesterday but hasn't used it today; says he plans to use it later today.  Denies any fevers, chills, CP, or SOB. Denies any hematuria. Has not had BM.    Vital Signs Last 24 Hrs  T(C): 36.7 (20 Jul 2022 05:10), Max: 37.2 (19 Jul 2022 13:00)  T(F): 98 (20 Jul 2022 05:10), Max: 98.9 (19 Jul 2022 13:00)  HR: 82 (20 Jul 2022 05:10) (72 - 86)  BP: 115/67 (20 Jul 2022 05:10) (112/60 - 116/65)  BP(mean): --  RR: 18 (20 Jul 2022 05:10) (16 - 18)  SpO2: 98% (20 Jul 2022 05:10) (98% - 100%)    Parameters below as of 20 Jul 2022 05:10  Patient On (Oxygen Delivery Method): room air        PHYSICAL EXAM:  GENERAL: NAD, well-developed  CHEST/LUNG: Clear to auscultation bilaterally; No wheeze  HEART: Regular rate and rhythm; Normal S1 S2, No murmurs, rubs, or gallops  ABDOMEN: Soft, Nontender, Nondistended; Bowel sounds present  EXTREMITIES:  L leg generally larger than R leg with bandage on lateral thigh; nonbloody. Generally, not warm or erythematous appearing. Has scrape wound on R knee from fall; currently wrapped after wound care consult. Able to slide L left along the bed and lifts max 1 cm off the bed. No tenderness to palpation of the leg.  PSYCH: AAOx3    LABS:                        7.8    8.54  )-----------( 169      ( 20 Jul 2022 06:30 )             25.3     Hgb Trend: 7.8<--, 8.0<--, 8.2<--, 9.7<--, 9.4<--  07-20    138  |  103  |  17  ----------------------------<  103<H>  4.3   |  27  |  0.65    Ca    8.8      20 Jul 2022 06:30  Phos  3.2     07-20  Mg     2.00     07-20    TPro  5.9<L>  /  Alb  3.2<L>  /  TBili  0.8  /  DBili  x   /  AST  29  /  ALT  31  /  AlkPhos  44  07-20    Creatinine Trend: 0.65<--, 0.64<--, 0.79<--, 0.73<--, 0.73<--, 0.93<--  LIVER FUNCTIONS - ( 20 Jul 2022 06:30 )  Alb: 3.2 g/dL / Pro: 5.9 g/dL / ALK PHOS: 44 U/L / ALT: 31 U/L / AST: 29 U/L / GGT: x                  Dalia Singleton  PGY-1 Resident Physician   Pager [phone]/ [phone]    Patient is a 56y old  Male who presents with a chief complaint of hip pain (20 Jul 2022 07:30)      SUBJECTIVE / OVERNIGHT EVENTS:  Patient seen and evaluated at bedside.  Patient has no complaints this morning. Says he is feeling well; pain is well controlled. Says he feels like he is improving by 2% every day. Says he was using incentive spirometer yesterday but hasn't used it today; says he plans to use it later today.  Denies any fevers, chills, CP, or SOB. Denies any hematuria. Has not had BM.   Patient visited again with team. Endorsing no concerns or complaints. Says pain is controlled and he only has issues if he tries to move his leg excessively. Was cooperative with exam and open to training ultrasound practice. Participated during exam making comments about findings.    Vital Signs Last 24 Hrs  T(C): 36.7 (20 Jul 2022 05:10), Max: 37.2 (19 Jul 2022 13:00)  T(F): 98 (20 Jul 2022 05:10), Max: 98.9 (19 Jul 2022 13:00)  HR: 82 (20 Jul 2022 05:10) (72 - 86)  BP: 115/67 (20 Jul 2022 05:10) (112/60 - 116/65)  BP(mean): --  RR: 18 (20 Jul 2022 05:10) (16 - 18)  SpO2: 98% (20 Jul 2022 05:10) (98% - 100%)    Parameters below as of 20 Jul 2022 05:10  Patient On (Oxygen Delivery Method): room air        PHYSICAL EXAM:  GENERAL: NAD, well-developed  CHEST/LUNG: Clear to auscultation bilaterally; No wheeze  HEART: Regular rate and rhythm; Normal S1 S2, No murmurs, rubs, or gallops  ABDOMEN: Soft, Nontender, Nondistended; Bowel sounds present  EXTREMITIES:  L leg generally larger than R leg with bandage on lateral thigh; nonbloody. Generally, not warm or erythematous appearing. Has scrape wound on R knee from fall; currently wrapped after wound care consult. Able to slide L left along the bed and lifts max 1 cm off the bed. No tenderness to palpation of the leg.  PSYCH: AAOx3    LABS:                        7.8    8.54  )-----------( 169      ( 20 Jul 2022 06:30 )             25.3     Hgb Trend: 7.8<--, 8.0<--, 8.2<--, 9.7<--, 9.4<--  07-20    138  |  103  |  17  ----------------------------<  103<H>  4.3   |  27  |  0.65    Ca    8.8      20 Jul 2022 06:30  Phos  3.2     07-20  Mg     2.00     07-20    TPro  5.9<L>  /  Alb  3.2<L>  /  TBili  0.8  /  DBili  x   /  AST  29  /  ALT  31  /  AlkPhos  44  07-20    Creatinine Trend: 0.65<--, 0.64<--, 0.79<--, 0.73<--, 0.73<--, 0.93<--  LIVER FUNCTIONS - ( 20 Jul 2022 06:30 )  Alb: 3.2 g/dL / Pro: 5.9 g/dL / ALK PHOS: 44 U/L / ALT: 31 U/L / AST: 29 U/L / GGT: x

## 2022-07-20 NOTE — BH CONSULTATION LIAISON PROGRESS NOTE - NSBHASSESSMENTFT_PSY_ALL_CORE
Patient is a 57 y/o M w PSH of tonsillectomy, no prevenlant PMH, PPHx of schizophrenia, presents with acute left hip fracture. Patient has a hx of inpatient psychiatric admissions, as per chart review patient with multiple inpatient admission, patient has 2 admission to [facility] in 2008 and 2015. It is noted that patient has a hx of being on haldol 5mg TID and cogentin, however currently on no medications. Patient has no known SA, no hx of violence noted in chart review, no reported substance abuse. Patient not in current psychiatric treatment. Psychiatry called for capacity as patient is refusing recommended surgical repair of hip fracture. Determined patient lackedapacity to refuse surgical intervention, recommend family involvement in decision making.    7/18: Patient received surgery, though he continues to disagree with fact that he needed it. He is agreeable of dispo to rehab. Attempted to engage in discussion with patient to start antipsychotic, though he continues to lack insight/judgement, denies psych history. Would offer antipsychotic though he may refuse.  7/20: Patient calm, cooperative, still perseverative on [facility], still somewhat oddly related, though remains in behavioral control. He is compliant with standing Risperdal and states meds are calming for him.     PLAN:  - INCREASE Risperdal to 2mg qhs   - Patient currently calm, behavior in control, chronic delusions  - antipsychotics can only be given if qtc < 500   - PRN for agitation, haldol 5mg q6hrs if qtc < 500  - no SI or HI. defer observational status to primary team - determine physical abilities and if at risk for  - Patient currently does not meet criteria for inpatient psychiatric hospitalization, as he is not an acute danger to himself or others Patient is a 57 y/o M w PSH of tonsillectomy, no prevalent PMH, PPHx of schizophrenia, presents with acute left hip fracture. Patient has a hx of inpatient psychiatric admissions, as per chart review patient with multiple inpatient admission, patient has 2 admission to [facility] in 2008 and 2015. It is noted that patient has a hx of being on haldol 5mg TID and cogentin, however currently on no medications. Patient has no known SA, no hx of violence noted in chart review, no reported substance abuse. Patient not in current psychiatric treatment. Psychiatry called for capacity as patient is refusing recommended surgical repair of hip fracture. Determined patient lacked capacity to refuse surgical intervention, recommend family involvement in decision making.    7/18: Patient received surgery, though he continues to disagree with fact that he needed it. He is agreeable of dispo to rehab. Attempted to engage in discussion with patient to start antipsychotic, though he continues to lack insight/judgement, denies psych history. Would offer antipsychotic though he may refuse.  7/20: Patient calm, cooperative, still perseverative on [facility], still somewhat oddly related, though remains in behavioral control. He is compliant with standing Risperdal and states meds are calming for him.     PLAN:  - INCREASE Risperdal to 2mg qhs   - Patient currently calm, behavior in control, chronic delusions  - antipsychotics can only be given if qtc < 500   - PRN for agitation, haldol 5mg q6hrs if qtc < 500  - no SI or HI. defer observational status to primary team - determine physical abilities and if at risk for  - Patient currently does not meet criteria for inpatient psychiatric hospitalization, as he is not an acute danger to himself or others Patient is a 57 y/o M w PSH of tonsillectomy, no prevalent PMH, PPHx of schizophrenia, presents with acute left hip fracture. Patient has a hx of inpatient psychiatric admissions, as per chart review patient with multiple inpatient admission, patient has 2 admission to [facility] in 2008 and 2015. It is noted that patient has a hx of being on haldol 5mg TID and cogentin, however currently on no medications. Patient has no known SA, no hx of violence noted in chart review, no reported substance abuse. Patient not in current psychiatric treatment. Psychiatry called for capacity as patient is refusing recommended surgical repair of hip fracture. Determined patient lacked capacity to refuse surgical intervention, recommend family involvement in decision making.    7/18: Patient received surgery, though he continues to disagree with fact that he needed it. He is agreeable of dispo to rehab. Attempted to engage in discussion with patient to start antipsychotic, though he continues to lack insight/judgement, denies psych history. Would offer antipsychotic though he may refuse.  7/20: Patient calm, cooperative, still perseverative on [facility], still somewhat oddly related, though remains in behavioral control. He is compliant with standing Risperdal and states meds are calming for him.     PLAN:  - INCREASE Risperdal to 2mg qhs   - Please obtain A1c, lipid panel   - Patient currently calm, behavior in control, chronic delusions  - antipsychotics can only be given if qtc < 500   - PRN for agitation, haldol 5mg q6hrs if qtc < 500  - no SI or HI. defer observational status to primary team - determine physical abilities and if at risk for  - Patient currently does not meet criteria for inpatient psychiatric hospitalization, as he is not an acute danger to himself or others Patient is a 55 y/o M w PSH of tonsillectomy, no prevalent PMH, PPHx of schizophrenia, presents with acute left hip fracture. Patient has a hx of inpatient psychiatric admissions, as per chart review patient with multiple inpatient admission, patient has 2 admission to [facility] in 2008 and 2015. It is noted that patient has a hx of being on haldol 5mg TID and cogentin, however currently on no medications. Patient has no known SA, no hx of violence noted in chart review, no reported substance abuse. Patient not in current psychiatric treatment. Psychiatry called for capacity as patient is refusing recommended surgical repair of hip fracture. Determined patient lacked capacity to refuse surgical intervention, recommend family involvement in decision making.    7/18: Patient received surgery, though he continues to disagree with fact that he needed it. He is agreeable of dispo to rehab. Attempted to engage in discussion with patient to start antipsychotic, though he continues to lack insight/judgement, denies psych history. Would offer antipsychotic though he may refuse.  7/20: Patient calm, cooperative, still perseverative on [facility], still somewhat oddly related, though remains in behavioral control. He is compliant with standing Risperdal and states meds are calming for him.     PLAN:  - INCREASE Risperdal to 2mg qhs   - Please obtain A1c, lipid panel   - Patient currently calm, behavior in control, chronic delusions  - antipsychotics can only be given if qtc < 500   - PRN for agitation, haldol 5mg q6hrs if qtc < 500  - no SI or HI. defer observational status to primary team - determine physical abilities and if at risk for  - Patient currently does not meet criteria for inpatient psychiatric hospitalization, as he is not an acute danger to himself or others    Dispo [] patient should have psychiatry see him while he is at rehab to help determine if risperidone can be further titrated as well as help further establish outpatient care

## 2022-07-21 LAB
ANION GAP SERPL CALC-SCNC: 9 MMOL/L — SIGNIFICANT CHANGE UP (ref 7–14)
BUN SERPL-MCNC: 15 MG/DL — SIGNIFICANT CHANGE UP (ref 7–23)
CALCIUM SERPL-MCNC: 8.6 MG/DL — SIGNIFICANT CHANGE UP (ref 8.4–10.5)
CHLORIDE SERPL-SCNC: 103 MMOL/L — SIGNIFICANT CHANGE UP (ref 98–107)
CO2 SERPL-SCNC: 25 MMOL/L — SIGNIFICANT CHANGE UP (ref 22–31)
CREAT SERPL-MCNC: 0.63 MG/DL — SIGNIFICANT CHANGE UP (ref 0.5–1.3)
EGFR: 111 ML/MIN/1.73M2 — SIGNIFICANT CHANGE UP
GLUCOSE SERPL-MCNC: 103 MG/DL — HIGH (ref 70–99)
HCT VFR BLD CALC: 24.3 % — LOW (ref 39–50)
HGB BLD-MCNC: 7.8 G/DL — LOW (ref 13–17)
MAGNESIUM SERPL-MCNC: 2 MG/DL — SIGNIFICANT CHANGE UP (ref 1.6–2.6)
MCHC RBC-ENTMCNC: 27 PG — SIGNIFICANT CHANGE UP (ref 27–34)
MCHC RBC-ENTMCNC: 32.1 GM/DL — SIGNIFICANT CHANGE UP (ref 32–36)
MCV RBC AUTO: 84.1 FL — SIGNIFICANT CHANGE UP (ref 80–100)
NRBC # BLD: 0 /100 WBCS — SIGNIFICANT CHANGE UP
NRBC # FLD: 0 K/UL — SIGNIFICANT CHANGE UP
PHOSPHATE SERPL-MCNC: 3.3 MG/DL — SIGNIFICANT CHANGE UP (ref 2.5–4.5)
PLATELET # BLD AUTO: 223 K/UL — SIGNIFICANT CHANGE UP (ref 150–400)
POTASSIUM SERPL-MCNC: 4.2 MMOL/L — SIGNIFICANT CHANGE UP (ref 3.5–5.3)
POTASSIUM SERPL-SCNC: 4.2 MMOL/L — SIGNIFICANT CHANGE UP (ref 3.5–5.3)
RBC # BLD: 2.89 M/UL — LOW (ref 4.2–5.8)
RBC # FLD: 15 % — HIGH (ref 10.3–14.5)
SODIUM SERPL-SCNC: 137 MMOL/L — SIGNIFICANT CHANGE UP (ref 135–145)
WBC # BLD: 9.6 K/UL — SIGNIFICANT CHANGE UP (ref 3.8–10.5)
WBC # FLD AUTO: 9.6 K/UL — SIGNIFICANT CHANGE UP (ref 3.8–10.5)

## 2022-07-21 PROCEDURE — 99232 SBSQ HOSP IP/OBS MODERATE 35: CPT

## 2022-07-21 PROCEDURE — 99232 SBSQ HOSP IP/OBS MODERATE 35: CPT | Mod: GC

## 2022-07-21 RX ORDER — RISPERIDONE 4 MG/1
2 TABLET ORAL AT BEDTIME
Refills: 0 | Status: DISCONTINUED | OUTPATIENT
Start: 2022-07-21 | End: 2022-07-21

## 2022-07-21 RX ORDER — RISPERIDONE 4 MG/1
1 TABLET ORAL AT BEDTIME
Refills: 0 | Status: DISCONTINUED | OUTPATIENT
Start: 2022-07-21 | End: 2022-08-08

## 2022-07-21 RX ADMIN — Medication 975 MILLIGRAM(S): at 05:45

## 2022-07-21 RX ADMIN — ENOXAPARIN SODIUM 40 MILLIGRAM(S): 100 INJECTION SUBCUTANEOUS at 17:16

## 2022-07-21 RX ADMIN — POLYETHYLENE GLYCOL 3350 17 GRAM(S): 17 POWDER, FOR SOLUTION ORAL at 17:16

## 2022-07-21 RX ADMIN — Medication 975 MILLIGRAM(S): at 22:58

## 2022-07-21 RX ADMIN — Medication 975 MILLIGRAM(S): at 12:15

## 2022-07-21 RX ADMIN — POLYETHYLENE GLYCOL 3350 17 GRAM(S): 17 POWDER, FOR SOLUTION ORAL at 05:45

## 2022-07-21 RX ADMIN — RISPERIDONE 1 MILLIGRAM(S): 4 TABLET ORAL at 22:58

## 2022-07-21 NOTE — PROGRESS NOTE ADULT - SUBJECTIVE AND OBJECTIVE BOX
SUBJECTIVE  No acute events overnight. Pain controlled.    OBJECTIVE  Vital Signs Last 24 Hrs  T(C): 36.8 (20 Jul 2022 21:54), Max: 36.8 (20 Jul 2022 21:54)  T(F): 98.3 (20 Jul 2022 21:54), Max: 98.3 (20 Jul 2022 21:54)  HR: 81 (20 Jul 2022 21:54) (81 - 92)  BP: 134/65 (20 Jul 2022 21:54) (115/67 - 134/65)  RR: 17 (20 Jul 2022 21:54) (17 - 18)  SpO2: 100% (20 Jul 2022 21:54) (98% - 100%)  Parameters below as of 20 Jul 2022 21:54  Patient On (Oxygen Delivery Method): room air      PHYSICAL EXAM  Gen: Lying in bed, NAD  Resp: No increased WOB  LLE:  Dressings c/d/i, compartments soft, no calf TTP b/l  Motor: TA/EHL/GS/FHL intact  Sensory: DP/SP/Tib/Inna/Saph SILT  +DP pulse, WWP    LABS                          8.4    9.91  )-----------( 210      ( 20 Jul 2022 15:11 )             27.1                        8.0    8.74  )-----------( 145      ( 19 Jul 2022 06:00 )             25.6              07-20    138  |  103  |  17  ----------------------------<  103<H>  4.3   |  27  |  0.65    Ca    8.8      20 Jul 2022 06:30  Phos  3.2     07-20  Mg     2.00     07-20    TPro  5.9<L>  /  Alb  3.2<L>  /  TBili  0.8  /  DBili  x   /  AST  29  /  ALT  31  /  AlkPhos  44  07-20      ASSESSMENT & PLAN  56yMale s/p L femoral IMN on 7/17/22.  -WBAT LLE  -recommend transfusing 1u PRBCs if hgb <8  -pain control  -ice/cold compress  -incentive spirometry  -DVT ppx: Lovenox (when discharged, please send out on aspirin 325 BID for 4-6 weeks)  -PT/OT

## 2022-07-21 NOTE — BH CONSULTATION LIAISON PROGRESS NOTE - NSBHFUPINTERVALHXFT_PSY_A_CORE
VSS, no PRNs overnight, no acute events. Patient compliant with standing medications.    Patient states he feels good, says he didn't get great sleep overnight due to interruptions from staff, but says that he feels well rested. Discussed home life, he says he spends most of his time reading, analyzing magazines about science innovation, says he sends suggestions about innovations to the writers. He states that his relationship with his mother is tenuous, as she went through 2 marriages, says he doesn't get involved with her much. He reports good appetite, no AVH, SI/HI, paranoia, depression, or anxiety. He continues to state that he tore his ligament and not his bone, but says his pain is improving greatly. Discussed Risperdal, which patient states he would not like to further increase.

## 2022-07-21 NOTE — PROGRESS NOTE ADULT - PROBLEM SELECTOR PLAN 3
Currently stable and not a harm to self or others. Not using any medications at home.  -currently calm and re-directable  - Qt/Qtc 398/438 ms from EKG 7/16  - haldol 5mg prn for acute agitation  - c/w risperdal 1mg at bedtime; has been compliant with risperdal since starting this medication inpatient Currently stable and not a harm to self or others. Not using any medications at home.  -currently calm and re-directable  - Qt/Qtc 398/438 ms from EKG 7/16  - haldol 5mg prn for acute agitation  - patient endorsed wanting to increase risperdal  - increase risperdal to 2mg qhs Currently stable and not a harm to self or others. Not using any medications at home.  -currently calm and re-directable  - Qt/Qtc 398/438 ms from EKG 7/16  - haldol 5mg prn for acute agitation  - patient endorsed wanting to increase risperdal  - c/w risperdal 1 mg qhs per psych  Outpatient psych followup

## 2022-07-21 NOTE — PROGRESS NOTE ADULT - SUBJECTIVE AND OBJECTIVE BOX
Patient is a 56y old  Male who presents with a chief complaint of hip pain (21 Jul 2022 07:23)      Interval history: hg decreased to 7.8.  Denies new symptoms, reports pain controlled and tolerating bedside therapy, states it is getting easier to  RLE.  States he is happy he wears scds at nights, wants increased "force for more power."  on Risperdal.      REVIEW OF SYSTEMS  +pain controlled, weakness    PAST MEDICAL & SURGICAL HISTORY  Paranoid schizophrenia    Bipolar 1 disorder    No significant past surgical history    S/P tonsillectomy         CURRENT FUNCTIONAL STATUS  Bed Mobility  Bed Mobility Training Rehab Potential: good, to achieve stated therapy goals  Bed Mobility Training Symptoms Noted During/After Treatment: none  Bed Mobility Training Scooting: minimum assist (75% patient effort);  1 person assist;  verbal cues  Bed Mobility Training Sit-to-Supine: pt left in chair  Bed Mobility Training Supine-to-Sit: minimum assist (75% patient effort);  1 person assist;  verbal cues  Bed Mobility Training Limitations: decreased strength;  impaired balance;  decreased ROM    Sit-Stand Transfer Training  Sit-to-Stand Transfer Training Rehab Potential: good, to achieve stated therapy goals  Sit-to-Stand Transfer Training Symptoms Noted During/After Treatment: none  Transfer Training Sit-to-Stand Transfer: minimum assist (75% patient effort);  1 person assist;  verbal cues;  weight-bearing as tolerated   rolling walker  Transfer Training Stand-to-Sit Transfer: minimum assist (75% patient effort);  1 person assist;  verbal cues;  weight-bearing as tolerated   rolling walker  Sit-to-Stand Transfer Training Transfer Safety Analysis: decreased balance;  decreased strength;  impaired balance;  decreased ROM;  decreased flexibility;  rolling walker    Gait Training  Gait Training Rehab Potential: good, to achieve stated therapy goals  Gait Training Symptoms Noted During/After Treatment: none  Gait Training: minimum assist (75% patient effort);  1 person assist;  verbal cues;  weight-bearing as tolerated   rolling walker;  50 feet  Gait Analysis: 3-point gait   decreased jamal;  increased time in double stance;  decreased step length;  decreased stride length;  decreased weight-shifting ability;  decreased strength;  impaired balance;  decreased ROM;  decreased flexibility;  cognitive, decreased safety awareness;  50 feet;  rolling walker    Therapeutic Exercise  Therapeutic Exercise Rehab Effort: good  Therapeutic Exercise Symptoms Noted During/After Treatment: none  Therapeutic Exercise Detail: Pt performed 2 sets of 10 reps of bilateral UE and LE active/active assisted ROM Exercises to improve strength for transfers and ambulation: shoulder flexion,  seated knee extension, and seated marches.       FAMILY HISTORY    FH: deafness or hearing loss (Mother)        RECENT LABS/IMAGING  CBC Full  -  ( 21 Jul 2022 06:55 )  WBC Count : 9.60 K/uL  RBC Count : 2.89 M/uL  Hemoglobin : 7.8 g/dL  Hematocrit : 24.3 %  Platelet Count - Automated : 223 K/uL  Mean Cell Volume : 84.1 fL  Mean Cell Hemoglobin : 27.0 pg  Mean Cell Hemoglobin Concentration : 32.1 gm/dL  Auto Neutrophil # : x  Auto Lymphocyte # : x  Auto Monocyte # : x  Auto Eosinophil # : x  Auto Basophil # : x  Auto Neutrophil % : x  Auto Lymphocyte % : x  Auto Monocyte % : x  Auto Eosinophil % : x  Auto Basophil % : x    07-21    137  |  103  |  15  ----------------------------<  103<H>  4.2   |  25  |  0.63    Ca    8.6      21 Jul 2022 06:55  Phos  3.3     07-21  Mg     2.00     07-21    TPro  5.9<L>  /  Alb  3.2<L>  /  TBili  0.8  /  DBili  x   /  AST  29  /  ALT  31  /  AlkPhos  44  07-20        VITALS  T(C): 36.8 (07-20-22 @ 21:54), Max: 36.8 (07-20-22 @ 21:54)  HR: 81 (07-20-22 @ 21:54) (81 - 92)  BP: 134/65 (07-20-22 @ 21:54) (115/68 - 134/65)  RR: 17 (07-20-22 @ 21:54) (17 - 17)  SpO2: 100% (07-20-22 @ 21:54) (98% - 100%)  Wt(kg): --    ALLERGIES  No Known Allergies      MEDICATIONS   acetaminophen     Tablet .. 975 milliGRAM(s) Oral every 8 hours  aluminum hydroxide/magnesium hydroxide/simethicone Suspension 30 milliLiter(s) Oral every 4 hours PRN  bisacodyl Suppository 10 milliGRAM(s) Rectal daily  enoxaparin Injectable 40 milliGRAM(s) SubCutaneous every 24 hours  haloperidol     Tablet 5 milliGRAM(s) Oral every 6 hours PRN  melatonin 3 milliGRAM(s) Oral at bedtime PRN  ondansetron Injectable 4 milliGRAM(s) IV Push every 8 hours PRN  oxyCODONE    IR 10 milliGRAM(s) Oral every 4 hours PRN  oxyCODONE    IR 5 milliGRAM(s) Oral every 4 hours PRN  polyethylene glycol 3350 17 Gram(s) Oral two times a day  risperiDONE   Tablet 1 milliGRAM(s) Oral at bedtime  senna 2 Tablet(s) Oral at bedtime      ----------------------------------------------------------------------------------------  PHYSICAL EXAM  Constitutional - NAD, Comfortable  HEENT - NCAT, EOMI   Chest - no respiratory distress  Cardiovascular - RRR, S1S2   Abdomen -  Soft, NTND  Extremities - + dressing intact L hip, No calf tenderness   + dressing R knee  Neurologic Exam -                    Cognitive - Awake, Alert, AAO to self, place, date, year, situation     Communication - Fluent, No dysarthria     Cranial Nerves - CN 2-12 intact     Motor -                      LEFT    UE - ShAB 5/5, EF 5/5, EE 5/5, WE 5/5,  5/5                    RIGHT UE - ShAB 5/5, EF 5/5, EE 5/5, WE 5/5,  5/5                    LEFT    LE - HF 2/5, KE 1/5, DF 5/5, PF 5/5                       RIGHT LE - HF 5/5, KE 5/5, DF 5/5, PF 5/5        Sensory - Intact to LT      Balance - WNL Static  Psychiatric - Mood stable, Affect WNL  ----------------------------------------------------------------------------------------  ASSESSMENT/PLAN   56 year old male s/p L femoral IMN  paranoia appears improved on Risperdal       WBAT LLE  Pain - oxy ir prn, tylenol, bowel regimen  Hg trending down, monitor, possible transfusion planned per team.  bowel regimen, treatment for constipation  DVT PPX - lovenox  Diet: regular  continue bedside PT and OT   Rehab -recommend acute inpatient rehab when medically cleared. Patient can tolerate 3 hours per day of therapy with medical supervision

## 2022-07-21 NOTE — PROGRESS NOTE ADULT - PROBLEM SELECTOR PLAN 5
- Fluids: NA  - Electrolytes: Will replete to maintain K>4, Phos>3, and Mag>2  - Nutrition: regular  - Activity: with PT support  - DVT Prophylaxis: lovenox  - Stress Ulcer/GI Prophylaxis: NA  - Disposition: acute rehab  - Communication: spoke with mother Tiffanie and caretaker Anahi at bedside this afternoon. Explained updates in terms of pending rehab placement and that patient did not have insurance so process may take time. Family states that Medicaid card was given to ZACHARY (unsure if plan is active). Additionally, was able to consent Tiffanie for blood transfusion is necessary. Consent signed and in patient chart.

## 2022-07-21 NOTE — BH CONSULTATION LIAISON PROGRESS NOTE - NSBHCONSULTFOLLOWAFTERCARE_PSY_A_CORE FT
Patient can follow up with LISA FAY at [phone] [] patient should have psychiatry see him while he is at rehab to help determine if risperidone can be further titrated as well as help further establish outpatient care    Patient can follow up with LISA FAY at [phone]

## 2022-07-21 NOTE — PROGRESS NOTE ADULT - PROBLEM SELECTOR PLAN 4
-mild thrombocytopenia noted on admission   -B12 and folate wnl  - platelets 169 today  - resolved - Fluids: NA  - Electrolytes: Will replete to maintain K>4, Phos>3, and Mag>2  - Nutrition: regular  - Activity: with PT support  - DVT Prophylaxis: lovenox  - Stress Ulcer/GI Prophylaxis: NA  - Disposition: acute rehab

## 2022-07-21 NOTE — BH CONSULTATION LIAISON PROGRESS NOTE - ATTENDING COMMENTS
Chart reviewed. I personally did not see the patient today but discussed the plan with Dr. Ken and agree with above. Hypertriglyceridemia Treatment: I explained this is common when taking isotretinoin. If this worsens they will contact us. They may try OTC ibuprofen.

## 2022-07-21 NOTE — PROGRESS NOTE ADULT - PROBLEM SELECTOR PROBLEM 5
Transaminitis
Transaminitis
Need for prophylactic measure
Transaminitis
Need for prophylactic measure

## 2022-07-21 NOTE — PROGRESS NOTE ADULT - PROBLEM SELECTOR PLAN 2
Patient with downtrending Hgb 7.8 this AM in the setting of recent L IMN 7/17 and hgb 10.3 on presentation.   likely 2/2 post operative hypovolemic status 2/2 anesthesia agents. Low suspicion for acute bleed at this time as patient has no symptoms   - trend CBC, active T&S  - f/u iron studies; would recommend screening colonoscopy on dc regardless of iron study results  - leg exam: negative for pain, poikilothermia, paresthesia, pulselessness and pallor. at this time, low concern for acute compartment symptom of the leg  - monitor  - transfuse if Hgb < 7; consent in chart Patient with downtrending Hgb 7.8 this AM in the setting of recent L IMN 7/17 and hgb 10.3 on presentation.   likely 2/2 post operative hypovolemic status 2/2 anesthesia agents. Low suspicion for acute bleed at this time as patient has no symptoms   - repeat CBC yest 8.4, this AM 7.8; no indication to transfuse at this time. patient remains asymptomatic  - iron studies wnl, B12 and folate wnl; would recommend screening colonoscopy on dc regardless of iron study results  - leg exam: negative for pain, poikilothermia, paresthesia, pulselessness and pallor. at this time, low concern for acute compartment symptom of the leg  - monitor  - transfuse if Hgb < 7; consent in chart

## 2022-07-21 NOTE — BH CONSULTATION LIAISON PROGRESS NOTE - NSBHASSESSMENTFT_PSY_ALL_CORE
Patient is a 57 y/o M w PSH of tonsillectomy, no prevalent PMH, PPHx of schizophrenia, presents with acute left hip fracture. Patient has a hx of inpatient psychiatric admissions, as per chart review patient with multiple inpatient admission, patient has 2 admission to [facility] in 2008 and 2015. It is noted that patient has a hx of being on haldol 5mg TID and cogentin, however currently on no medications. Patient has no known SA, no hx of violence noted in chart review, no reported substance abuse. Patient not in current psychiatric treatment. Psychiatry called for capacity as patient is refusing recommended surgical repair of hip fracture. Determined patient lacked capacity to refuse surgical intervention, recommend family involvement in decision making.    7/18: Patient received surgery, though he continues to disagree with fact that he needed it. He is agreeable of dispo to rehab. Attempted to engage in discussion with patient to start antipsychotic, though he continues to lack insight/judgement, denies psych history. Would offer antipsychotic though he may refuse.  7/20: Patient calm, cooperative, still perseverative on [facility], still somewhat oddly related, though remains in behavioral control. He is compliant with standing Risperdal and states meds are calming for him.   7/21: Patient continues to be oddly related, shows signs of grandiosity, though remains in behavioral control, no active hallucinations, SI, or mood disorder noted. Patient compliant with Risperdal, however seems reluctant on further increase.     PLAN:  - C/w Risperdal to 1mg qhs as patient reported that he would not like to increase it   - Please obtain A1c, lipid panel   - Patient currently calm, behavior in control, chronic delusions  - antipsychotics can only be given if qtc < 500   - PRN for agitation, haldol 5mg q6hrs if qtc < 500  - no SI or HI. defer observational status to primary team   - Patient currently does not meet criteria for inpatient psychiatric hospitalization, as he is not an acute danger to himself or others    Dispo [] patient should have psychiatry see him while he is at rehab to help determine if risperidone can be further titrated as well as help further establish outpatient care

## 2022-07-21 NOTE — PROGRESS NOTE ADULT - SUBJECTIVE AND OBJECTIVE BOX
Dalia Singleton  PGY-1 Resident Physician   Pager [phone]/ [phone]    Patient is a 56y old  Male who presents with a chief complaint of hip pain (21 Jul 2022 12:46)      SUBJECTIVE / OVERNIGHT EVENTS:  Patient seen and evaluated at bedside.    Denies any fevers, chills, CP, or SOB.    Vital Signs Last 24 Hrs  T(C): 36.9 (21 Jul 2022 13:41), Max: 36.9 (21 Jul 2022 13:41)  T(F): 98.4 (21 Jul 2022 13:41), Max: 98.4 (21 Jul 2022 13:41)  HR: 80 (21 Jul 2022 13:41) (80 - 81)  BP: 109/60 (21 Jul 2022 13:41) (109/60 - 134/65)  BP(mean): --  RR: 17 (21 Jul 2022 13:41) (17 - 17)  SpO2: 99% (21 Jul 2022 13:41) (99% - 100%)    Parameters below as of 21 Jul 2022 13:41  Patient On (Oxygen Delivery Method): room air        PHYSICAL EXAM:  GENERAL: NAD, well-developed  CHEST/LUNG: Clear to auscultation bilaterally; No wheeze  HEART: Regular rate and rhythm; Normal S1 S2, No murmurs, rubs, or gallops  ABDOMEN: Soft, Nontender, Nondistended; Bowel sounds present  EXTREMITIES:  L leg generally larger than R leg with bandage on lateral thigh; nonbloody. Generally, not warm or erythematous appearing. Has scrape wound on R knee from fall; currently wrapped after wound care consult. Able to slide L left along the bed and lifts max 1 cm off the bed. No tenderness to palpation of the leg.  PSYCH: AAOx3    LABS:                        7.8    9.60  )-----------( 223      ( 21 Jul 2022 06:55 )             24.3     Hgb Trend: 7.8<--, 8.4<--, 7.8<--, 8.0<--, 8.2<--  07-21    137  |  103  |  15  ----------------------------<  103<H>  4.2   |  25  |  0.63    Ca    8.6      21 Jul 2022 06:55  Phos  3.3     07-21  Mg     2.00     07-21    TPro  5.9<L>  /  Alb  3.2<L>  /  TBili  0.8  /  DBili  x   /  AST  29  /  ALT  31  /  AlkPhos  44  07-20    Creatinine Trend: 0.63<--, 0.65<--, 0.64<--, 0.79<--, 0.73<--, 0.73<--  LIVER FUNCTIONS - ( 20 Jul 2022 06:30 )  Alb: 3.2 g/dL / Pro: 5.9 g/dL / ALK PHOS: 44 U/L / ALT: 31 U/L / AST: 29 U/L / GGT: x

## 2022-07-21 NOTE — PROGRESS NOTE ADULT - PROBLEM SELECTOR PLAN 1
-pt p/w acute left hip fracture. Initially pt refusing surgery; then agreeable and spoke with mother. IMN on 7/17  - c/w pain control  - c/w bowel regimen  - incentive spirometer at bedside and patient endorses using regularly  - dvt ppx - lovenox  - R knee scrape evaluated by wound care and currently dressed with recommendations for care  - PT, OT and PMNR suggesting acute rehab   - pending placement; complicated by lack of insurance  - will send with aspirin 325 BID for 4-6 weeks on dc -pt p/w acute left hip fracture. Initially pt refusing surgery; then agreeable and spoke with mother. IMN on 7/17  - c/w pain control  - c/w bowel regimen  - incentive spirometer at bedside and patient endorses using regularly  - dvt ppx - lovenox  - R knee scrape evaluated by wound care and currently dressed; wound care recs appreciated  - PT, OT and PMNR suggesting acute rehab   - pending placement; complicated by lack of insurance  - will send with aspirin 325 BID for 4-6 weeks on dc

## 2022-07-22 LAB
A1C WITH ESTIMATED AVERAGE GLUCOSE RESULT: 5.2 % — SIGNIFICANT CHANGE UP (ref 4–5.6)
ANION GAP SERPL CALC-SCNC: 6 MMOL/L — LOW (ref 7–14)
BUN SERPL-MCNC: 15 MG/DL — SIGNIFICANT CHANGE UP (ref 7–23)
CALCIUM SERPL-MCNC: 8.6 MG/DL — SIGNIFICANT CHANGE UP (ref 8.4–10.5)
CHLORIDE SERPL-SCNC: 104 MMOL/L — SIGNIFICANT CHANGE UP (ref 98–107)
CHOLEST SERPL-MCNC: 122 MG/DL — SIGNIFICANT CHANGE UP
CO2 SERPL-SCNC: 28 MMOL/L — SIGNIFICANT CHANGE UP (ref 22–31)
CREAT SERPL-MCNC: 0.65 MG/DL — SIGNIFICANT CHANGE UP (ref 0.5–1.3)
EGFR: 110 ML/MIN/1.73M2 — SIGNIFICANT CHANGE UP
ESTIMATED AVERAGE GLUCOSE: 103 — SIGNIFICANT CHANGE UP
GLUCOSE SERPL-MCNC: 102 MG/DL — HIGH (ref 70–99)
HCT VFR BLD CALC: 24.8 % — LOW (ref 39–50)
HDLC SERPL-MCNC: 46 MG/DL — SIGNIFICANT CHANGE UP
HGB BLD-MCNC: 7.7 G/DL — LOW (ref 13–17)
LIPID PNL WITH DIRECT LDL SERPL: 59 MG/DL — SIGNIFICANT CHANGE UP
MAGNESIUM SERPL-MCNC: 2.1 MG/DL — SIGNIFICANT CHANGE UP (ref 1.6–2.6)
MCHC RBC-ENTMCNC: 26.9 PG — LOW (ref 27–34)
MCHC RBC-ENTMCNC: 31 GM/DL — LOW (ref 32–36)
MCV RBC AUTO: 86.7 FL — SIGNIFICANT CHANGE UP (ref 80–100)
NON HDL CHOLESTEROL: 76 MG/DL — SIGNIFICANT CHANGE UP
NRBC # BLD: 0 /100 WBCS — SIGNIFICANT CHANGE UP
NRBC # FLD: 0 K/UL — SIGNIFICANT CHANGE UP
PHOSPHATE SERPL-MCNC: 3.1 MG/DL — SIGNIFICANT CHANGE UP (ref 2.5–4.5)
PLATELET # BLD AUTO: 235 K/UL — SIGNIFICANT CHANGE UP (ref 150–400)
POTASSIUM SERPL-MCNC: 4.2 MMOL/L — SIGNIFICANT CHANGE UP (ref 3.5–5.3)
POTASSIUM SERPL-SCNC: 4.2 MMOL/L — SIGNIFICANT CHANGE UP (ref 3.5–5.3)
RBC # BLD: 2.86 M/UL — LOW (ref 4.2–5.8)
RBC # FLD: 15.2 % — HIGH (ref 10.3–14.5)
SODIUM SERPL-SCNC: 138 MMOL/L — SIGNIFICANT CHANGE UP (ref 135–145)
TRIGL SERPL-MCNC: 86 MG/DL — SIGNIFICANT CHANGE UP
WBC # BLD: 7.69 K/UL — SIGNIFICANT CHANGE UP (ref 3.8–10.5)
WBC # FLD AUTO: 7.69 K/UL — SIGNIFICANT CHANGE UP (ref 3.8–10.5)

## 2022-07-22 PROCEDURE — 99232 SBSQ HOSP IP/OBS MODERATE 35: CPT | Mod: GC

## 2022-07-22 RX ADMIN — Medication 975 MILLIGRAM(S): at 22:09

## 2022-07-22 RX ADMIN — ENOXAPARIN SODIUM 40 MILLIGRAM(S): 100 INJECTION SUBCUTANEOUS at 17:21

## 2022-07-22 RX ADMIN — SENNA PLUS 2 TABLET(S): 8.6 TABLET ORAL at 22:05

## 2022-07-22 RX ADMIN — RISPERIDONE 1 MILLIGRAM(S): 4 TABLET ORAL at 22:04

## 2022-07-22 RX ADMIN — OXYCODONE HYDROCHLORIDE 10 MILLIGRAM(S): 5 TABLET ORAL at 19:52

## 2022-07-22 RX ADMIN — Medication 10 MILLIGRAM(S): at 11:13

## 2022-07-22 RX ADMIN — Medication 975 MILLIGRAM(S): at 05:15

## 2022-07-22 RX ADMIN — Medication 975 MILLIGRAM(S): at 13:29

## 2022-07-22 RX ADMIN — POLYETHYLENE GLYCOL 3350 17 GRAM(S): 17 POWDER, FOR SOLUTION ORAL at 17:17

## 2022-07-22 RX ADMIN — POLYETHYLENE GLYCOL 3350 17 GRAM(S): 17 POWDER, FOR SOLUTION ORAL at 05:15

## 2022-07-22 RX ADMIN — OXYCODONE HYDROCHLORIDE 10 MILLIGRAM(S): 5 TABLET ORAL at 18:57

## 2022-07-22 RX ADMIN — Medication 975 MILLIGRAM(S): at 14:29

## 2022-07-22 NOTE — PROGRESS NOTE ADULT - PROBLEM SELECTOR PLAN 3
Currently stable and not a harm to self or others. Not using any medications at home.  -currently calm and re-directable  - Qt/Qtc 398/438 ms from EKG 7/16  - haldol 5mg prn for acute agitation  - c/w risperdal 1 mg qhs per psych  Outpatient psych followup Currently stable and not a harm to self or others. Not using any medications at home.  -currently calm and re-directable  - Qt/Qtc 398/438 ms from EKG 7/16  - haldol 5mg prn for acute agitation  - c/w risperdal 1 mg qhs per psych  - outpatient psych followup  - would recommend psych followup in rehab

## 2022-07-22 NOTE — PROGRESS NOTE ADULT - PROBLEM SELECTOR PLAN 1
-pt p/w acute left hip fracture. Initially pt refusing surgery; then agreeable and spoke with mother. IMN on 7/17  - c/w pain control  - c/w bowel regimen  - incentive spirometer at bedside and patient endorses using regularly  - dvt ppx - lovenox  - R knee scrape evaluated by wound care and currently dressed; wound care recs appreciated  - PT, OT and PMNR suggesting acute rehab   - pending placement; complicated by lack of insurance  - will send with aspirin 325 BID for 4-6 weeks on dc  - OP follow up w Dr. Stafford (orthopedics) in office 1-2 weeks after discharge -pt p/w acute left hip fracture. Initially pt refusing surgery; then agreeable and spoke with mother. IMN on 7/17  - c/w pain control  - c/w bowel regimen  - incentive spirometer at bedside and patient endorses using regularly  - dvt ppx - lovenox  - R knee scrape evaluated by wound care and currently dressed; wound care recs appreciated and followed   - PT, OT and PMNR suggesting acute rehab   - pending placement; complicated by lack of insurance  - will send with aspirin 325 BID for 4-6 weeks on dc  - OP follow up w Dr. Stafford (orthopedics) in office 1-2 weeks after discharge; RIP emailed for followup appt

## 2022-07-22 NOTE — PROGRESS NOTE ADULT - PROBLEM SELECTOR PLAN 1
-pt p/w acute left hip fracture. Initially pt refusing surgery; then agreeable and spoke with mother. IMN on 7/17  - c/w pain control  - c/w bowel regimen  - incentive spirometer at bedside and patient endorses using regularly  - dvt ppx - lovenox  - R knee scrape evaluated by wound care and currently dressed; wound care recs appreciated  - PT, OT and PMNR suggesting acute rehab   - pending placement; complicated by lack of insurance  - will send with aspirin 325 BID for 4-6 weeks on dc

## 2022-07-22 NOTE — PROGRESS NOTE ADULT - PROBLEM SELECTOR PLAN 4
- Fluids: NA  - Electrolytes: Will replete to maintain K>4, Phos>3, and Mag>2  - Nutrition: regular  - Activity: with PT support  - DVT Prophylaxis: lovenox  - Stress Ulcer/GI Prophylaxis: NA  - Disposition: acute rehab

## 2022-07-22 NOTE — PROGRESS NOTE ADULT - PROBLEM SELECTOR PLAN 2
Patient with Hgb --- this AM in the setting of recent L IMN 7/17 and hgb 10.3 on presentation.   likely 2/2 post operative hypovolemic status 2/2 anesthesia agents. Low suspicion for acute bleed at this time as patient has no symptoms   - repeat CBC yest 8.4, this AM 7.8; no indication to transfuse at this time. patient remains asymptomatic  - iron studies wnl, B12 and folate wnl; would recommend screening colonoscopy on dc regardless of iron study results  - leg exam: negative for pain, poikilothermia, paresthesia, pulselessness and pallor. at this time, low concern for acute compartment symptom of the leg  - monitor  - transfuse if Hgb < 7; consent in chart Patient with Hgb --- this AM in the setting of recent L IMN 7/17 and hgb 10.3 on presentation.   likely 2/2 post operative hypovolemic status 2/2 anesthesia agents. Low suspicion for acute bleed at this time as patient has no symptoms   - CBC this AM 7.7; no indication to transfuse at this time. patient remains asymptomatic  - iron studies wnl, B12 and folate wnl; would recommend screening colonoscopy on dc regardless of iron study results  - leg exam: negative for pain, poikilothermia, paresthesia, pulselessness and pallor. at this time, low concern for acute compartment symptom of the leg  - monitor  - transfuse if Hgb < 7; consent in chart

## 2022-07-22 NOTE — PROGRESS NOTE ADULT - PROBLEM SELECTOR PLAN 4
- Fluids: NA  - Electrolytes: Will replete to maintain K>4, Phos>3, and Mag>2  - Nutrition: regular  - Activity: with PT support  - DVT Prophylaxis: lovenox  - Stress Ulcer/GI Prophylaxis: NA  - Disposition: acute rehab - Fluids: NA  - Electrolytes: Will replete to maintain K>4, Phos>3, and Mag>2  - Nutrition: regular  - Activity: with PT support  - DVT Prophylaxis: lovenox  - Stress Ulcer/GI Prophylaxis: NA  - Disposition: acute rehab pending

## 2022-07-22 NOTE — PROGRESS NOTE ADULT - SUBJECTIVE AND OBJECTIVE BOX
Pt seen/examined. Doing well. Pain controlled. No acute overnight complaints or events. Got up and walked with PT yesterday    T(C): 36.8 (07-22-22 @ 05:33), Max: 36.9 (07-21-22 @ 13:41)  HR: 79 (07-22-22 @ 05:33) (79 - 83)  BP: 129/66 (07-22-22 @ 05:33) (109/60 - 129/66)  RR: 17 (07-22-22 @ 05:33) (17 - 17)  SpO2: 98% (07-22-22 @ 05:33) (98% - 99%)  Wt(kg): --  - Gen: NAD    PHYSICAL EXAM  Gen: Lying in bed, NAD  Resp: No increased WOB  LLE:  Dressings c/d/i, compartments soft, no calf TTP b/l  Motor: TA/EHL/GS/FHL intact  Sensory: DP/SP/Tib/Inna/Saph SILT  +DP pulse, WWP    ASSESSMENT & PLAN    56yMale s/p L femoral IMN on 7/17/22.  -WBAT LLE  -recommend transfusing 1u PRBCs if hgb <8  -pain control  -ice/cold compress  -incentive spirometry  -DVT ppx: Lovenox (when discharged, please send out on aspirin 325 BID for 4-6 weeks)  -PT/OT    Orthopaedics to sign off. Please page back with questions. F/u with Dr. Stafford in office 1-2 weeks after discharge.

## 2022-07-22 NOTE — PROGRESS NOTE ADULT - PROBLEM SELECTOR PLAN 3
Currently stable and not a harm to self or others. Not using any medications at home.  -currently calm and re-directable  - Qt/Qtc 398/438 ms from EKG 7/16  - haldol 5mg prn for acute agitation  - patient endorsed wanting to increase risperdal  - c/w risperdal 1 mg qhs per psych  Outpatient psych followup

## 2022-07-22 NOTE — PROGRESS NOTE ADULT - SUBJECTIVE AND OBJECTIVE BOX
Dalia Singleton  PGY-1 Resident Physician   Pager [phone]/ [phone]    Patient is a 56y old  Male who presents with a chief complaint of hip pain (22 Jul 2022 07:50)      SUBJECTIVE / OVERNIGHT EVENTS:  Patient seen and evaluated at bedside.  States he is feeling well today; has no complaints. Says he is not currently having any pain. Verbose on clarifying that he had "discharge" in the form of urination multiple times and defaecation once. Says he has been using the incentive spirometer at bedside.  Denies any fevers, chills, CP, or SOB.    Vital Signs Last 24 Hrs  T(C): 36.8 (22 Jul 2022 12:50), Max: 36.8 (21 Jul 2022 21:39)  T(F): 98.2 (22 Jul 2022 12:50), Max: 98.3 (21 Jul 2022 21:39)  HR: 92 (22 Jul 2022 12:50) (79 - 92)  BP: 124/74 (22 Jul 2022 12:50) (123/65 - 129/66)  BP(mean): --  RR: 18 (22 Jul 2022 12:50) (17 - 18)  SpO2: 100% (22 Jul 2022 12:50) (98% - 100%)    Parameters below as of 22 Jul 2022 12:50  Patient On (Oxygen Delivery Method): room air        PHYSICAL EXAM:  GENERAL: NAD, well-developed  CHEST/LUNG: Clear to auscultation bilaterally; No wheeze  HEART: Regular rate and rhythm; Normal S1 S2, No murmurs, rubs, or gallops  ABDOMEN: Soft, Nontender, Nondistended; Bowel sounds present  EXTREMITIES:  2+ Peripheral Pulses, No clubbing, cyanosis, or edema. Able to raise L leg a little over 1 cm this AM.   PSYCH: AAOx3    LABS:                        7.7    7.69  )-----------( 235      ( 22 Jul 2022 10:32 )             24.8     Hgb Trend: 7.7<--, 7.8<--, 8.4<--, 7.8<--, 8.0<--  07-22    138  |  104  |  15  ----------------------------<  102<H>  4.2   |  28  |  0.65    Ca    8.6      22 Jul 2022 10:32  Phos  3.1     07-22  Mg     2.10     07-22      Creatinine Trend: 0.65<--, 0.63<--, 0.65<--, 0.64<--, 0.79<--, 0.73<--

## 2022-07-22 NOTE — PROGRESS NOTE ADULT - PROBLEM SELECTOR PLAN 2
Patient with downtrending Hgb 7.8 this AM in the setting of recent L IMN 7/17 and hgb 10.3 on presentation.   likely 2/2 post operative hypovolemic status 2/2 anesthesia agents. Low suspicion for acute bleed at this time as patient has no symptoms   - repeat CBC yest 8.4, this AM 7.8; no indication to transfuse at this time. patient remains asymptomatic  - iron studies wnl, B12 and folate wnl; would recommend screening colonoscopy on dc regardless of iron study results  - leg exam: negative for pain, poikilothermia, paresthesia, pulselessness and pallor. at this time, low concern for acute compartment symptom of the leg  - monitor  - transfuse if Hgb < 7; consent in chart

## 2022-07-23 LAB
ANION GAP SERPL CALC-SCNC: 6 MMOL/L — LOW (ref 7–14)
BUN SERPL-MCNC: 18 MG/DL — SIGNIFICANT CHANGE UP (ref 7–23)
CALCIUM SERPL-MCNC: 8.6 MG/DL — SIGNIFICANT CHANGE UP (ref 8.4–10.5)
CHLORIDE SERPL-SCNC: 103 MMOL/L — SIGNIFICANT CHANGE UP (ref 98–107)
CO2 SERPL-SCNC: 25 MMOL/L — SIGNIFICANT CHANGE UP (ref 22–31)
CREAT SERPL-MCNC: 0.64 MG/DL — SIGNIFICANT CHANGE UP (ref 0.5–1.3)
EGFR: 110 ML/MIN/1.73M2 — SIGNIFICANT CHANGE UP
GLUCOSE SERPL-MCNC: 78 MG/DL — SIGNIFICANT CHANGE UP (ref 70–99)
HCT VFR BLD CALC: 25.9 % — LOW (ref 39–50)
HGB BLD-MCNC: 7.9 G/DL — LOW (ref 13–17)
MAGNESIUM SERPL-MCNC: 2.1 MG/DL — SIGNIFICANT CHANGE UP (ref 1.6–2.6)
MCHC RBC-ENTMCNC: 26.7 PG — LOW (ref 27–34)
MCHC RBC-ENTMCNC: 30.5 GM/DL — LOW (ref 32–36)
MCV RBC AUTO: 87.5 FL — SIGNIFICANT CHANGE UP (ref 80–100)
NRBC # BLD: 0 /100 WBCS — SIGNIFICANT CHANGE UP
NRBC # FLD: 0 K/UL — SIGNIFICANT CHANGE UP
PHOSPHATE SERPL-MCNC: 3.3 MG/DL — SIGNIFICANT CHANGE UP (ref 2.5–4.5)
PLATELET # BLD AUTO: 269 K/UL — SIGNIFICANT CHANGE UP (ref 150–400)
POTASSIUM SERPL-MCNC: 4.1 MMOL/L — SIGNIFICANT CHANGE UP (ref 3.5–5.3)
POTASSIUM SERPL-SCNC: 4.1 MMOL/L — SIGNIFICANT CHANGE UP (ref 3.5–5.3)
RBC # BLD: 2.96 M/UL — LOW (ref 4.2–5.8)
RBC # FLD: 15.9 % — HIGH (ref 10.3–14.5)
SARS-COV-2 RNA SPEC QL NAA+PROBE: SIGNIFICANT CHANGE UP
SODIUM SERPL-SCNC: 134 MMOL/L — LOW (ref 135–145)
WBC # BLD: 7.73 K/UL — SIGNIFICANT CHANGE UP (ref 3.8–10.5)
WBC # FLD AUTO: 7.73 K/UL — SIGNIFICANT CHANGE UP (ref 3.8–10.5)

## 2022-07-23 PROCEDURE — 99232 SBSQ HOSP IP/OBS MODERATE 35: CPT

## 2022-07-23 RX ADMIN — OXYCODONE HYDROCHLORIDE 10 MILLIGRAM(S): 5 TABLET ORAL at 04:15

## 2022-07-23 RX ADMIN — Medication 975 MILLIGRAM(S): at 05:54

## 2022-07-23 RX ADMIN — Medication 975 MILLIGRAM(S): at 14:25

## 2022-07-23 RX ADMIN — ENOXAPARIN SODIUM 40 MILLIGRAM(S): 100 INJECTION SUBCUTANEOUS at 19:24

## 2022-07-23 RX ADMIN — OXYCODONE HYDROCHLORIDE 10 MILLIGRAM(S): 5 TABLET ORAL at 03:35

## 2022-07-23 RX ADMIN — POLYETHYLENE GLYCOL 3350 17 GRAM(S): 17 POWDER, FOR SOLUTION ORAL at 05:53

## 2022-07-23 RX ADMIN — SENNA PLUS 2 TABLET(S): 8.6 TABLET ORAL at 21:56

## 2022-07-23 RX ADMIN — Medication 975 MILLIGRAM(S): at 06:07

## 2022-07-23 RX ADMIN — OXYCODONE HYDROCHLORIDE 5 MILLIGRAM(S): 5 TABLET ORAL at 13:00

## 2022-07-23 RX ADMIN — Medication 975 MILLIGRAM(S): at 15:25

## 2022-07-23 RX ADMIN — OXYCODONE HYDROCHLORIDE 5 MILLIGRAM(S): 5 TABLET ORAL at 12:00

## 2022-07-23 RX ADMIN — Medication 975 MILLIGRAM(S): at 21:56

## 2022-07-23 RX ADMIN — POLYETHYLENE GLYCOL 3350 17 GRAM(S): 17 POWDER, FOR SOLUTION ORAL at 19:24

## 2022-07-23 NOTE — PROGRESS NOTE ADULT - PROBLEM SELECTOR PLAN 1
-pt p/w acute left hip fracture. Initially pt refusing surgery; then agreeable and spoke with mother. IMN on 7/17  - c/w pain control  - c/w bowel regimen  - incentive spirometer at bedside and patient endorses using regularly  - dvt ppx - lovenox  - R knee scrape evaluated by wound care and currently dressed; wound care recs appreciated and followed   - PT, OT and PMNR suggesting acute rehab   - pending placement; complicated by lack of insurance  - will send with aspirin 325 BID for 4-6 weeks on dc  - OP follow up w Dr. Stafford (orthopedics) in office 1-2 weeks after discharge; RIP emailed for followup appt

## 2022-07-23 NOTE — PROGRESS NOTE ADULT - PROBLEM SELECTOR PLAN 2
Patient with Hgb --- this AM in the setting of recent L IMN 7/17 and hgb 10.3 on presentation.   likely 2/2 post operative hypovolemic status 2/2 anesthesia agents. Low suspicion for acute bleed at this time as patient has no symptoms   - CBC this AM 7.7; no indication to transfuse at this time. patient remains asymptomatic  - iron studies wnl, B12 and folate wnl; would recommend screening colonoscopy on dc regardless of iron study results  - leg exam: negative for pain, poikilothermia, paresthesia, pulselessness and pallor. at this time, low concern for acute compartment symptom of the leg  - monitor  - transfuse if Hgb < 7; consent in chart Patient with Hgb --- this AM in the setting of recent L IMN 7/17 and hgb 10.3 on presentation.   likely 2/2 post operative hypovolemic status 2/2 anesthesia agents. Low suspicion for acute bleed at this time as patient has no symptoms   - CBC this AM 7.9; no indication to transfuse at this time. patient remains asymptomatic  - iron studies wnl, B12 and folate wnl; would recommend screening colonoscopy on dc regardless of iron study results  - leg exam: negative for pain, poikilothermia, paresthesia, pulselessness and pallor. at this time, low concern for acute compartment symptom of the leg  - monitor  - transfuse if Hgb < 7; consent in chart

## 2022-07-23 NOTE — PROGRESS NOTE ADULT - PROBLEM SELECTOR PLAN 4
- Fluids: NA  - Electrolytes: Will replete to maintain K>4, Phos>3, and Mag>2  - Nutrition: regular  - Activity: with PT support  - DVT Prophylaxis: lovenox  - Stress Ulcer/GI Prophylaxis: NA  - Disposition: acute rehab pending

## 2022-07-23 NOTE — PROGRESS NOTE ADULT - PROBLEM SELECTOR PLAN 3
Currently stable and not a harm to self or others. Not using any medications at home.  -currently calm and re-directable  - Qt/Qtc 398/438 ms from EKG 7/16  - haldol 5mg prn for acute agitation  - c/w risperdal 1 mg qhs per psych  - outpatient psych followup  - would recommend psych followup in rehab

## 2022-07-23 NOTE — PROGRESS NOTE ADULT - SUBJECTIVE AND OBJECTIVE BOX
Dalia Singleton  PGY-1 Resident Physician   Pager [phone]/ [phone]    Patient is a 56y old  Male who presents with a chief complaint of hip pain (23 Jul 2022 07:30)      SUBJECTIVE / OVERNIGHT EVENTS:  Patient seen and evaluated at bedside.    Denies any fevers, chills, CP, or SOB.    Vital Signs Last 24 Hrs  T(C): 36.9 (23 Jul 2022 05:50), Max: 36.9 (23 Jul 2022 05:50)  T(F): 98.5 (23 Jul 2022 05:50), Max: 98.5 (23 Jul 2022 05:50)  HR: 71 (23 Jul 2022 05:50) (71 - 92)  BP: 114/69 (23 Jul 2022 05:50) (108/64 - 125/68)  BP(mean): --  RR: 17 (23 Jul 2022 05:50) (17 - 18)  SpO2: 100% (23 Jul 2022 05:50) (100% - 100%)    Parameters below as of 23 Jul 2022 05:50  Patient On (Oxygen Delivery Method): room air        PHYSICAL EXAM:  GENERAL: NAD, well-developed  CHEST/LUNG: Clear to auscultation bilaterally; No wheeze  HEART: Regular rate and rhythm; Normal S1 S2, No murmurs, rubs, or gallops  ABDOMEN: Soft, Nontender, Nondistended; Bowel sounds present  EXTREMITIES:  2+ Peripheral Pulses, No clubbing, cyanosis, or edema  PSYCH: AAOx3    LABS:                        7.9    7.73  )-----------( 269      ( 23 Jul 2022 05:15 )             25.9     Hgb Trend: 7.9<--, 7.7<--, 7.8<--, 8.4<--, 7.8<--  07-23    134<L>  |  103  |  18  ----------------------------<  78  4.1   |  25  |  0.64    Ca    8.6      23 Jul 2022 05:15  Phos  3.3     07-23  Mg     2.10     07-23      Creatinine Trend: 0.64<--, 0.65<--, 0.63<--, 0.65<--, 0.64<--, 0.79<--           Dalia Singleton  PGY-1 Resident Physician   Pager [phone]/ [phone]    Patient is a 56y old  Male who presents with a chief complaint of hip pain (23 Jul 2022 07:30)      SUBJECTIVE / OVERNIGHT EVENTS:  Patient seen and evaluated at bedside.  Patient says he is feeling well and has no complaints at this time. Says the "courthouse mandated excedrin 5 time a day" for him and that it will be "written on the screen." Patient not having any headaches at this time or dizziness. Says that he had a BM yesterday. leg pain is controlled; says that he only feels some pain when he tries to move his legs.  Denies any fevers, chills, CP, or SOB.    Vital Signs Last 24 Hrs  T(C): 36.9 (23 Jul 2022 05:50), Max: 36.9 (23 Jul 2022 05:50)  T(F): 98.5 (23 Jul 2022 05:50), Max: 98.5 (23 Jul 2022 05:50)  HR: 71 (23 Jul 2022 05:50) (71 - 92)  BP: 114/69 (23 Jul 2022 05:50) (108/64 - 125/68)  BP(mean): --  RR: 17 (23 Jul 2022 05:50) (17 - 18)  SpO2: 100% (23 Jul 2022 05:50) (100% - 100%)    Parameters below as of 23 Jul 2022 05:50  Patient On (Oxygen Delivery Method): room air        PHYSICAL EXAM:  GENERAL: NAD, well-developed  CHEST/LUNG: Clear to auscultation bilaterally; No wheeze  HEART: Regular rate and rhythm; Normal S1 S2, No murmurs, rubs, or gallops  ABDOMEN: Soft, Nontender, Nondistended; Bowel sounds present  EXTREMITIES:  2+ Peripheral Pulses, No clubbing, cyanosis, or edema. Able to raise L leg a little over 1 cm this AM.   PSYCH: AAOx3    LABS:                        7.9    7.73  )-----------( 269      ( 23 Jul 2022 05:15 )             25.9     Hgb Trend: 7.9<--, 7.7<--, 7.8<--, 8.4<--, 7.8<--  07-23    134<L>  |  103  |  18  ----------------------------<  78  4.1   |  25  |  0.64    Ca    8.6      23 Jul 2022 05:15  Phos  3.3     07-23  Mg     2.10     07-23      Creatinine Trend: 0.64<--, 0.65<--, 0.63<--, 0.65<--, 0.64<--, 0.79<--

## 2022-07-24 LAB
ANION GAP SERPL CALC-SCNC: 8 MMOL/L — SIGNIFICANT CHANGE UP (ref 7–14)
BUN SERPL-MCNC: 13 MG/DL — SIGNIFICANT CHANGE UP (ref 7–23)
CALCIUM SERPL-MCNC: 8.5 MG/DL — SIGNIFICANT CHANGE UP (ref 8.4–10.5)
CHLORIDE SERPL-SCNC: 102 MMOL/L — SIGNIFICANT CHANGE UP (ref 98–107)
CO2 SERPL-SCNC: 24 MMOL/L — SIGNIFICANT CHANGE UP (ref 22–31)
CREAT SERPL-MCNC: 0.62 MG/DL — SIGNIFICANT CHANGE UP (ref 0.5–1.3)
EGFR: 111 ML/MIN/1.73M2 — SIGNIFICANT CHANGE UP
GLUCOSE SERPL-MCNC: 95 MG/DL — SIGNIFICANT CHANGE UP (ref 70–99)
HCT VFR BLD CALC: 25.2 % — LOW (ref 39–50)
HGB BLD-MCNC: 7.8 G/DL — LOW (ref 13–17)
MAGNESIUM SERPL-MCNC: 1.9 MG/DL — SIGNIFICANT CHANGE UP (ref 1.6–2.6)
MCHC RBC-ENTMCNC: 26.5 PG — LOW (ref 27–34)
MCHC RBC-ENTMCNC: 31 GM/DL — LOW (ref 32–36)
MCV RBC AUTO: 85.7 FL — SIGNIFICANT CHANGE UP (ref 80–100)
NRBC # BLD: 0 /100 WBCS — SIGNIFICANT CHANGE UP
NRBC # FLD: 0 K/UL — SIGNIFICANT CHANGE UP
PHOSPHATE SERPL-MCNC: 3.2 MG/DL — SIGNIFICANT CHANGE UP (ref 2.5–4.5)
PLATELET # BLD AUTO: 280 K/UL — SIGNIFICANT CHANGE UP (ref 150–400)
POTASSIUM SERPL-MCNC: 4.2 MMOL/L — SIGNIFICANT CHANGE UP (ref 3.5–5.3)
POTASSIUM SERPL-SCNC: 4.2 MMOL/L — SIGNIFICANT CHANGE UP (ref 3.5–5.3)
RBC # BLD: 2.94 M/UL — LOW (ref 4.2–5.8)
RBC # FLD: 16.2 % — HIGH (ref 10.3–14.5)
SODIUM SERPL-SCNC: 134 MMOL/L — LOW (ref 135–145)
WBC # BLD: 7.4 K/UL — SIGNIFICANT CHANGE UP (ref 3.8–10.5)
WBC # FLD AUTO: 7.4 K/UL — SIGNIFICANT CHANGE UP (ref 3.8–10.5)

## 2022-07-24 PROCEDURE — 99232 SBSQ HOSP IP/OBS MODERATE 35: CPT

## 2022-07-24 RX ADMIN — Medication 975 MILLIGRAM(S): at 05:23

## 2022-07-24 RX ADMIN — Medication 975 MILLIGRAM(S): at 13:25

## 2022-07-24 RX ADMIN — ENOXAPARIN SODIUM 40 MILLIGRAM(S): 100 INJECTION SUBCUTANEOUS at 18:40

## 2022-07-24 RX ADMIN — OXYCODONE HYDROCHLORIDE 10 MILLIGRAM(S): 5 TABLET ORAL at 18:43

## 2022-07-24 RX ADMIN — Medication 975 MILLIGRAM(S): at 21:16

## 2022-07-24 RX ADMIN — OXYCODONE HYDROCHLORIDE 10 MILLIGRAM(S): 5 TABLET ORAL at 19:40

## 2022-07-24 RX ADMIN — Medication 975 MILLIGRAM(S): at 13:55

## 2022-07-24 NOTE — PROGRESS NOTE ADULT - ASSESSMENT
55 y/o M w PMH of paranoid schizophrenia (with multiple hospitalizations) presents with acute left hip fracture, s/p IMN 7/17. Psych consult for medication mgmt of schizophrenia. Medically optimized pending placement to [facility]

## 2022-07-24 NOTE — PROGRESS NOTE ADULT - SUBJECTIVE AND OBJECTIVE BOX
***************************************************************  Fawad Rob, PGY2  Internal Medicine   pager: [phone]  ***************************************************************    CORINNE FARMER  56y  MRN: [id number]    Patient is a 56y old  Male who presents with a chief complaint of hip pain (23 Jul 2022 07:30)      Subjective: no events ON. Denies fever, CP, SOB, abn pain, N/V, dysuria. Tolerating diet.      MEDICATIONS  (STANDING):  acetaminophen     Tablet .. 975 milliGRAM(s) Oral every 8 hours  bisacodyl Suppository 10 milliGRAM(s) Rectal daily  enoxaparin Injectable 40 milliGRAM(s) SubCutaneous every 24 hours  polyethylene glycol 3350 17 Gram(s) Oral two times a day  risperiDONE   Tablet 1 milliGRAM(s) Oral at bedtime  senna 2 Tablet(s) Oral at bedtime    MEDICATIONS  (PRN):  aluminum hydroxide/magnesium hydroxide/simethicone Suspension 30 milliLiter(s) Oral every 4 hours PRN Dyspepsia  haloperidol     Tablet 5 milliGRAM(s) Oral every 6 hours PRN agitation  melatonin 3 milliGRAM(s) Oral at bedtime PRN Insomnia  ondansetron Injectable 4 milliGRAM(s) IV Push every 8 hours PRN Nausea and/or Vomiting  oxyCODONE    IR 10 milliGRAM(s) Oral every 4 hours PRN Severe Pain (7 - 10)  oxyCODONE    IR 5 milliGRAM(s) Oral every 4 hours PRN Moderate Pain (4 - 6)      Objective:    Vitals: Vital Signs Last 24 Hrs  T(C): 36.8 (07-24-22 @ 07:36), Max: 37.2 (07-23-22 @ 14:07)  T(F): 98.2 (07-24-22 @ 07:36), Max: 99 (07-23-22 @ 14:07)  HR: 81 (07-24-22 @ 07:36) (81 - 84)  BP: 118/69 (07-24-22 @ 07:36) (118/69 - 126/73)  BP(mean): --  RR: 17 (07-24-22 @ 07:36) (16 - 17)  SpO2: 100% (07-24-22 @ 07:36) (100% - 100%)            I&O's Summary    23 Jul 2022 07:01  -  24 Jul 2022 07:00  --------------------------------------------------------  IN: 910 mL / OUT: 850 mL / NET: 60 mL        PHYSICAL EXAM:  GENERAL: NAD  HEAD:  Atraumatic, Normocephalic  EYES: EOMI, conjunctiva and sclera clear  CHEST/LUNG: Clear to percussion bilaterally; No rales, rhonchi, wheezing, or rubs  HEART: Regular rate and rhythm; No murmurs, rubs, or gallops  ABDOMEN: Soft, Nontender, Nondistended;   SKIN: No rashes or lesions  NERVOUS SYSTEM:  Alert & Oriented X3, no focal deficit    LABS:  07-23    134<L>  |  103  |  18  ----------------------------<  78  4.1   |  25  |  0.64  07-22    138  |  104  |  15  ----------------------------<  102<H>  4.2   |  28  |  0.65    Ca    8.6      23 Jul 2022 05:15  Ca    8.6      22 Jul 2022 10:32  Phos  3.3     07-23  Mg     2.10     07-23                                                7.9    7.73  )-----------( 269      ( 23 Jul 2022 05:15 )             25.9                         7.7    7.69  )-----------( 235      ( 22 Jul 2022 10:32 )             24.8     CAPILLARY BLOOD GLUCOSE          RADIOLOGY & ADDITIONAL TESTS:    Imaging Personally Reviewed:  [x ] YES  [ ] NO    Consultants involved in case:   Consultant(s) Notes Reviewed:  [ x] YES  [ ] NO:   Care Discussed with Consultants/Other Providers [x ] YES  [ ] NO         ***************************************************************  Fawad Rob, PGY2  Internal Medicine   pager: [phone]  ***************************************************************    CORINNE FARMER  56y  MRN: [id number]    Patient is a 56y old  Male who presents with a chief complaint of hip pain (23 Jul 2022 07:30)      Subjective: no events ON. Denies any pain in either LE. No pain at surgical site on LLE. Denies fever, CP, SOB, abn pain, N/V, dysuria. Tolerating diet.      MEDICATIONS  (STANDING):  acetaminophen     Tablet .. 975 milliGRAM(s) Oral every 8 hours  bisacodyl Suppository 10 milliGRAM(s) Rectal daily  enoxaparin Injectable 40 milliGRAM(s) SubCutaneous every 24 hours  polyethylene glycol 3350 17 Gram(s) Oral two times a day  risperiDONE   Tablet 1 milliGRAM(s) Oral at bedtime  senna 2 Tablet(s) Oral at bedtime    MEDICATIONS  (PRN):  aluminum hydroxide/magnesium hydroxide/simethicone Suspension 30 milliLiter(s) Oral every 4 hours PRN Dyspepsia  haloperidol     Tablet 5 milliGRAM(s) Oral every 6 hours PRN agitation  melatonin 3 milliGRAM(s) Oral at bedtime PRN Insomnia  ondansetron Injectable 4 milliGRAM(s) IV Push every 8 hours PRN Nausea and/or Vomiting  oxyCODONE    IR 10 milliGRAM(s) Oral every 4 hours PRN Severe Pain (7 - 10)  oxyCODONE    IR 5 milliGRAM(s) Oral every 4 hours PRN Moderate Pain (4 - 6)      Objective:    Vitals: Vital Signs Last 24 Hrs  T(C): 36.8 (07-24-22 @ 07:36), Max: 37.2 (07-23-22 @ 14:07)  T(F): 98.2 (07-24-22 @ 07:36), Max: 99 (07-23-22 @ 14:07)  HR: 81 (07-24-22 @ 07:36) (81 - 84)  BP: 118/69 (07-24-22 @ 07:36) (118/69 - 126/73)  BP(mean): --  RR: 17 (07-24-22 @ 07:36) (16 - 17)  SpO2: 100% (07-24-22 @ 07:36) (100% - 100%)            I&O's Summary    23 Jul 2022 07:01  -  24 Jul 2022 07:00  --------------------------------------------------------  IN: 910 mL / OUT: 850 mL / NET: 60 mL        PHYSICAL EXAM:  GENERAL: NAD  HEAD:  Atraumatic, Normocephalic  EYES: EOMI, conjunctiva and sclera clear  CHEST/LUNG: Clear to percussion bilaterally; No rales, rhonchi, wheezing, or rubs  HEART: Regular rate and rhythm; No murmurs, rubs, or gallops  ABDOMEN: Soft, Nontender, Nondistended;   SKIN: No rashes or lesions  NERVOUS SYSTEM:  Alert & Oriented X3, no focal deficit  EXT: R knee ACE bandage, LLE medial thigh incision bandage c/d/i     LABS:  07-23    134<L>  |  103  |  18  ----------------------------<  78  4.1   |  25  |  0.64  07-22    138  |  104  |  15  ----------------------------<  102<H>  4.2   |  28  |  0.65    Ca    8.6      23 Jul 2022 05:15  Ca    8.6      22 Jul 2022 10:32  Phos  3.3     07-23  Mg     2.10     07-23                                                7.9    7.73  )-----------( 269      ( 23 Jul 2022 05:15 )             25.9                         7.7    7.69  )-----------( 235      ( 22 Jul 2022 10:32 )             24.8     CAPILLARY BLOOD GLUCOSE          RADIOLOGY & ADDITIONAL TESTS:    Imaging Personally Reviewed:  [x ] YES  [ ] NO    Consultants involved in case:   Consultant(s) Notes Reviewed:  [ x] YES  [ ] NO:   Care Discussed with Consultants/Other Providers [x ] YES  [ ] NO

## 2022-07-24 NOTE — PROGRESS NOTE ADULT - PROBLEM SELECTOR PLAN 2
Patient with Hgb --- this AM in the setting of recent L IMN 7/17 and hgb 10.3 on presentation.   likely 2/2 post operative hypovolemic status 2/2 anesthesia agents. Low suspicion for acute bleed at this time as patient has no symptoms   - CBC this AM 7.9; no indication to transfuse at this time. patient remains asymptomatic  - iron studies wnl, B12 and folate wnl; would recommend screening colonoscopy on dc regardless of iron study results  - leg exam: negative for pain, poikilothermia, paresthesia, pulselessness and pallor. at this time, low concern for acute compartment symptom of the leg  - monitor  - transfuse if Hgb < 7; consent in chart

## 2022-07-25 LAB
ANION GAP SERPL CALC-SCNC: 7 MMOL/L — SIGNIFICANT CHANGE UP (ref 7–14)
BUN SERPL-MCNC: 12 MG/DL — SIGNIFICANT CHANGE UP (ref 7–23)
CALCIUM SERPL-MCNC: 8.6 MG/DL — SIGNIFICANT CHANGE UP (ref 8.4–10.5)
CHLORIDE SERPL-SCNC: 106 MMOL/L — SIGNIFICANT CHANGE UP (ref 98–107)
CO2 SERPL-SCNC: 25 MMOL/L — SIGNIFICANT CHANGE UP (ref 22–31)
CREAT SERPL-MCNC: 0.6 MG/DL — SIGNIFICANT CHANGE UP (ref 0.5–1.3)
EGFR: 113 ML/MIN/1.73M2 — SIGNIFICANT CHANGE UP
GLUCOSE SERPL-MCNC: 95 MG/DL — SIGNIFICANT CHANGE UP (ref 70–99)
HCT VFR BLD CALC: 25.6 % — LOW (ref 39–50)
HGB BLD-MCNC: 7.9 G/DL — LOW (ref 13–17)
MAGNESIUM SERPL-MCNC: 2 MG/DL — SIGNIFICANT CHANGE UP (ref 1.6–2.6)
MCHC RBC-ENTMCNC: 26.4 PG — LOW (ref 27–34)
MCHC RBC-ENTMCNC: 30.9 GM/DL — LOW (ref 32–36)
MCV RBC AUTO: 85.6 FL — SIGNIFICANT CHANGE UP (ref 80–100)
NRBC # BLD: 0 /100 WBCS — SIGNIFICANT CHANGE UP
NRBC # FLD: 0 K/UL — SIGNIFICANT CHANGE UP
PHOSPHATE SERPL-MCNC: 2.9 MG/DL — SIGNIFICANT CHANGE UP (ref 2.5–4.5)
PLATELET # BLD AUTO: 285 K/UL — SIGNIFICANT CHANGE UP (ref 150–400)
POTASSIUM SERPL-MCNC: 4.2 MMOL/L — SIGNIFICANT CHANGE UP (ref 3.5–5.3)
POTASSIUM SERPL-SCNC: 4.2 MMOL/L — SIGNIFICANT CHANGE UP (ref 3.5–5.3)
RBC # BLD: 2.99 M/UL — LOW (ref 4.2–5.8)
RBC # FLD: 16.5 % — HIGH (ref 10.3–14.5)
SODIUM SERPL-SCNC: 138 MMOL/L — SIGNIFICANT CHANGE UP (ref 135–145)
WBC # BLD: 6.65 K/UL — SIGNIFICANT CHANGE UP (ref 3.8–10.5)
WBC # FLD AUTO: 6.65 K/UL — SIGNIFICANT CHANGE UP (ref 3.8–10.5)

## 2022-07-25 PROCEDURE — 99232 SBSQ HOSP IP/OBS MODERATE 35: CPT

## 2022-07-25 RX ORDER — OXYCODONE HYDROCHLORIDE 5 MG/1
10 TABLET ORAL EVERY 4 HOURS
Refills: 0 | Status: DISCONTINUED | OUTPATIENT
Start: 2022-07-25 | End: 2022-07-27

## 2022-07-25 RX ORDER — IBUPROFEN 200 MG
600 TABLET ORAL ONCE
Refills: 0 | Status: COMPLETED | OUTPATIENT
Start: 2022-07-25 | End: 2022-07-25

## 2022-07-25 RX ADMIN — ENOXAPARIN SODIUM 40 MILLIGRAM(S): 100 INJECTION SUBCUTANEOUS at 17:26

## 2022-07-25 RX ADMIN — Medication 975 MILLIGRAM(S): at 05:24

## 2022-07-25 RX ADMIN — Medication 975 MILLIGRAM(S): at 15:22

## 2022-07-25 RX ADMIN — OXYCODONE HYDROCHLORIDE 10 MILLIGRAM(S): 5 TABLET ORAL at 10:05

## 2022-07-25 RX ADMIN — Medication 600 MILLIGRAM(S): at 20:57

## 2022-07-25 RX ADMIN — Medication 975 MILLIGRAM(S): at 22:00

## 2022-07-25 RX ADMIN — OXYCODONE HYDROCHLORIDE 10 MILLIGRAM(S): 5 TABLET ORAL at 09:06

## 2022-07-25 NOTE — DIETITIAN INITIAL EVALUATION ADULT - OTHER INFO
56 year old male with a PMH of paranoid schizophrenia presents with acute left hip fracture, s/p IMN 7/17, pending placement to [facility] per chart.    Patient reports improving appetite in house, noted with intakes 51-75% and % per RN flow sheet. Amenable to consume ensure enlive (250 kcal, 16 g pro). No GI distress or chewing/swallowing difficulties reported. Reports being lactose intolerant. Patient was unsure of UBW. No recent weights noted per HIE. ABW is 169 lbs. (7/17) per chart, will monitor. Noted with +1 L leg edema and no pressure injuries per RN flow sheet.    Discussed small frequent meals, calorie dense snacks and focusing on protein rich foods while appetite is suboptimal.

## 2022-07-25 NOTE — PROGRESS NOTE ADULT - PROBLEM SELECTOR PLAN 4
- Fluids: NA  - Electrolytes: Will replete to maintain K>4, Phos>3, and Mag>2  - Nutrition: regular  - Activity: with PT support  - DVT Prophylaxis: lovenox  - Stress Ulcer/GI Prophylaxis: NA  - Disposition: acute rehab pending.

## 2022-07-25 NOTE — DIETITIAN INITIAL EVALUATION ADULT - ORAL INTAKE PTA/DIET HISTORY
Patient seen for assessment. Reports decreased appetite x 1 week PTA r/t injury. Doesn't follow a diet at home.

## 2022-07-25 NOTE — PROGRESS NOTE ADULT - SUBJECTIVE AND OBJECTIVE BOX
Patient is a 56y old  Male who presents with a chief complaint of hip pain (2022 07:09)      HPI:  57 y/o M w PMH of paranoid schizophrenia (with multiple hospitalizations) presents with leg pain since Wednesday. Pt was gardening on Wednesday and fell while pulling on a vine.  He fell on his left side and has since been unable to ambulate on left leg. In the same accident, pt also scraped his right knee. The pain continued to progress, so he called EMS. In the fall, pt did not hit his head. Denies LOC. Prior to incident, he was able to climb stairs and perform ADLs without chest pain or SOB. Denies cardiac history. Denies A/V hallucinations. Denies SI/HI.   Extensive discussion with pt regarding hip fracture seen on xray and need for emergent surgery. He did not believe he had a fracture and instead stated "It is a tendon strain, I’ve had this before. I’ll wait two weeks and get a second opinion before pursing surgery." He was not able to repeat back to me the risks involved with delaying surgery. On exam he was circumferential and tangential, and with possibly paranoid thinking. When asked if I could reach out to his mother (next of kin), he gave me permission but stated "I’m in an active case against her for all she has done to me." He asked I reach out to his step uncle Brett Wadsworth but was unable to provide a number to get in contact with him. When asked about schizophrenia diagnosis, he states it was a misdiagnosis and he is not taking medications at the time. I attempted to reach out to his mother Tiffanie without response. As per prior documentation, she is hard of hearing and only able to communicate via email/text.   (2022 10:02)    refused risperdal last two nights.  patient is oriented x 3, states he needs "intervals" of therapy, states he can do intervals with pain control, wants to go to rehab.    More tangential today however participates in exam.       REVIEW OF SYSTEMS   pain  weakness    PAST MEDICAL & SURGICAL HISTORY  Paranoid schizophrenia    Bipolar 1 disorder    No significant past surgical history    S/P tonsillectomy      CURRENT FUNCTIONAL STATUS    Sit-Stand Transfer Training  Sit-to-Stand Transfer Training Rehab Potential: good, to achieve stated therapy goals  Sit-to-Stand Transfer Training Symptoms Noted During/After Treatment: none  Transfer Training Sit-to-Stand Transfer: contact guard;  verbal cues;  1 person assist;  weight-bearing as tolerated   rolling walker  Transfer Training Stand-to-Sit Transfer: contact guard;  1 person assist;  verbal cues;  weight-bearing as tolerated   rolling walker  Sit-to-Stand Transfer Training Transfer Safety Analysis: decreased step length;  decreased weight-shifting ability;  decreased sequencing ability;  stepping too close to front of assistive device;  decreased strength;  decreased flexibility;  impaired coordination;  rolling walker    Gait Training  Gait Training Rehab Potential: good, to achieve stated therapy goals  Gait Training Symptoms Noted During/After Treatment: none  Gait Trainin;  rolling walker;  weight-bearing as tolerated   1 person assist;  verbal cues;  contact guard  Gait Analysis: 3-point gait   decreased stride length;  decreased velocity of limb motion;  decreased hip/knee flexion;  increased time in double stance;  crouch;  decreased jamal;  decreased weight-shifting ability;  decreased trunk rotation;  decreased strength;  decreased flexibility;  40;  rolling walker  Gait Number of Times:: x 1    Therapeutic Exercise  Therapeutic Exercise Detail: ankle pumps, marches, long arc quads, upper extremity active ROM performed with complaints of stiffness though improving. encouraged HEP performance to maintain strength and prevent DVT         FAMILY HISTORY   No pertinent family history in first degree relatives    FH: deafness or hearing loss (Mother)        RECENT LABS/IMAGING  CBC Full  -  ( 2022 06:00 )  WBC Count : 6.65 K/uL  RBC Count : 2.99 M/uL  Hemoglobin : 7.9 g/dL  Hematocrit : 25.6 %  Platelet Count - Automated : 285 K/uL  Mean Cell Volume : 85.6 fL  Mean Cell Hemoglobin : 26.4 pg  Mean Cell Hemoglobin Concentration : 30.9 gm/dL  Auto Neutrophil # : x  Auto Lymphocyte # : x  Auto Monocyte # : x  Auto Eosinophil # : x  Auto Basophil # : x  Auto Neutrophil % : x  Auto Lymphocyte % : x  Auto Monocyte % : x  Auto Eosinophil % : x  Auto Basophil % : x        138  |  106  |  12  ----------------------------<  95  4.2   |  25  |  0.60    Ca    8.6      2022 06:00  Phos  2.9     -  Mg     2.00               VITALS  T(C): 37.3 (22 @ 05:17), Max: 37.3 (22 @ 05:17)  HR: 79 (22 @ 05:17) (79 - 85)  BP: 124/78 (22 @ 05:17) (124/78 - 135/98)  RR: 17 (22 @ 05:17) (16 - 17)  SpO2: 100% (22 @ 05:17) (100% - 100%)  Wt(kg): --    ALLERGIES  No Known Allergies      MEDICATIONS   acetaminophen     Tablet .. 975 milliGRAM(s) Oral every 8 hours  aluminum hydroxide/magnesium hydroxide/simethicone Suspension 30 milliLiter(s) Oral every 4 hours PRN  bisacodyl Suppository 10 milliGRAM(s) Rectal daily  enoxaparin Injectable 40 milliGRAM(s) SubCutaneous every 24 hours  haloperidol     Tablet 5 milliGRAM(s) Oral every 6 hours PRN  melatonin 3 milliGRAM(s) Oral at bedtime PRN  ondansetron Injectable 4 milliGRAM(s) IV Push every 8 hours PRN  oxyCODONE    IR 10 milliGRAM(s) Oral every 4 hours PRN  polyethylene glycol 3350 17 Gram(s) Oral two times a day  risperiDONE   Tablet 1 milliGRAM(s) Oral at bedtime  senna 2 Tablet(s) Oral at bedtime      ----------------------------------------------------------------------------------------  PHYSICAL EXAM  Constitutional - NAD, Comfortable  HEENT - NCAT, EOMI   Chest - no respiratory distress  Cardiovascular - RRR, S1S2   Abdomen -  Soft, NTND  Extremities - + dressing intact L hip, No calf tenderness   + dressing R knee  Neurologic Exam -                    Cognitive - Awake, Alert, AAO to self, place, date, year, situation     Communication - Fluent, No dysarthria     Cranial Nerves - CN 2-12 intact     Motor -                      LEFT    UE - ShAB 5/5, EF 5/5, EE 5/5, WE 5/5,  5/5                    RIGHT UE - ShAB 5/5, EF 5/5, EE 5/5, WE 5/5,  5/5                    LEFT    LE - HF 2+/5, KE 1/5, DF 5/5, PF 5/5                       RIGHT LE - HF 5/5, KE 5/5, DF 5/5, PF 5/5        Sensory - Intact to LT      Balance - WNL Static  Psychiatric - Mood stable, Affect WNL  ----------------------------------------------------------------------------------------  ASSESSMENT/PLAN   56 year old male s/p L femoral IMN  paranoia, on Risperdal but refused past 2 days     WBAT LLE  Pain - oxy ir prn, tylenol, bowel regimen  Hg stable, 7.9   bowel regimen, treatment for constipation  DVT PPX - lovenox  Diet: regular  continue bedside PT and OT   Rehab -recommend acute inpatient rehab when medically cleared. Patient can tolerate 3 hours per day of therapy with medical supervision

## 2022-07-25 NOTE — PROGRESS NOTE ADULT - PROBLEM SELECTOR PLAN 1
-pt p/w acute left hip fracture. Initially pt refusing surgery; then agreeable and spoke with mother. IMN on 7/17  - c/w pain control  - c/w bowel regimen  - incentive spirometer at bedside and patient endorses using regularly  - dvt ppx - lovenox  - R knee scrape evaluated by wound care and currently dressed; wound care recs appreciated and followed   - PT, OT and PMNR suggesting acute rehab   - pending placement; complicated by lack of insurance  - will send with aspirin 325 BID for 4-6 weeks on dc  - OP follow up w Dr. Stafford (orthopedics) in office 1-2 weeks after discharge; RIP emailed for followup appt.

## 2022-07-25 NOTE — DIETITIAN INITIAL EVALUATION ADULT - REASON FOR ADMISSION
Fracture of unspecified part of neck of unspecified femur, initial encounter for closed fracture

## 2022-07-25 NOTE — PROGRESS NOTE ADULT - PROBLEM SELECTOR PLAN 3
Currently stable and not a harm to self or others. Not using any medications at home.  -currently calm and re-directable  - Qt/Qtc 398/438 ms from EKG 7/16  - haldol 5mg prn for acute agitation  - c/w risperdal 1 mg qhs per psych  - outpatient psych followup  - would recommend psych followup in rehab. Currently stable and not a harm to self or others. Not using any medications at home.  -currently calm and re-directable but showing some signs of paranoia regarding his treatment regimen   - Qt/Qtc 398/438 ms from EKG 7/16  - haldol 5mg prn for acute agitation  - c/w risperdal 1 mg qhs per psych  - psych reconsulted for re-assessment of symptoms, response to treatment  - outpatient psych followup  - would recommend psych followup in rehab.

## 2022-07-25 NOTE — PROGRESS NOTE ADULT - SUBJECTIVE AND OBJECTIVE BOX
Patient Name: CORINNE FARMER  Patient MRN: [id number]    Pt is a 56y yo Male admitted for Patient is a 56y old  Male who presents with a chief complaint of hip pain (24 Jul 2022 07:44)      **SUBJECTIVE**    Last 24 hours: Overnight, patient required oxycodone for pain management. Otherwise, no acute overnight events. This AM....    Review of Systems  Constitutional: No weakness, fevers, or chills  HEENT: No headache, visual changes, lightheadedness, or sore throat  Respiratory: No shortness of breath, cough, wheezing, or hemoptysis  Cardiovascular: No chest pain or palpitations  GI: No abdominal or epigastric pain. No nausea, vomiting, or change in bowel habits. No hematemesis or hematochezia  Neuro: No numbness or weakness  Skin: No itching or rashes      **OBJECTIVE**    Physical Exam  T(C): 37.3 (07-25-22 @ 05:17), Max: 37.3 (07-25-22 @ 05:17)  HR: 79 (07-25-22 @ 05:17) (79 - 85)  BP: 124/78 (07-25-22 @ 05:17) (118/69 - 135/98)  RR: 17 (07-25-22 @ 05:17) (16 - 17)  SpO2: 100% (07-25-22 @ 05:17) (100% - 100%)    GENERAL: NAD  HEAD:  Atraumatic, Normocephalic  EYES: EOMI, conjunctiva and sclera clear  CHEST/LUNG: Clear to percussion bilaterally; No rales, rhonchi, wheezing, or rubs  HEART: Regular rate and rhythm; No murmurs, rubs, or gallops  ABDOMEN: Soft, Nontender, Nondistended;   SKIN: No rashes or lesions  NERVOUS SYSTEM:  Alert & Oriented X3, no focal deficit  EXT: R knee ACE bandage, LLE medial thigh incision bandage c/d/i    Labs  07-25    138  |  106  |  12  ----------------------------<  95  4.2   |  25  |  0.60    Ca    8.6      25 Jul 2022 06:00  Phos  2.9     07-25  Mg     2.00     07-25      CBC Full  -  ( 25 Jul 2022 06:00 )  WBC Count : 6.65 K/uL  RBC Count : 2.99 M/uL  Hemoglobin : 7.9 g/dL  Hematocrit : 25.6 %  Platelet Count - Automated : 285 K/uL  Mean Cell Volume : 85.6 fL  Mean Cell Hemoglobin : 26.4 pg  Mean Cell Hemoglobin Concentration : 30.9 gm/dL  Auto Neutrophil # : x  Auto Lymphocyte # : x  Auto Monocyte # : x  Auto Eosinophil # : x  Auto Basophil # : x  Auto Neutrophil % : x  Auto Lymphocyte % : x  Auto Monocyte % : x  Auto Eosinophil % : x  Auto Basophil % : x              POC Glucose  CAPILLARY BLOOD GLUCOSE          I&O's  I&O's Summary    24 Jul 2022 07:01  -  25 Jul 2022 07:00  --------------------------------------------------------  IN: 0 mL / OUT: 400 mL / NET: -400 mL        UA (if applicable)      Micro (if applicable)      Imaging (if applicable)    Active Medications  MEDICATIONS  (STANDING):  acetaminophen     Tablet .. 975 milliGRAM(s) Oral every 8 hours  bisacodyl Suppository 10 milliGRAM(s) Rectal daily  enoxaparin Injectable 40 milliGRAM(s) SubCutaneous every 24 hours  polyethylene glycol 3350 17 Gram(s) Oral two times a day  risperiDONE   Tablet 1 milliGRAM(s) Oral at bedtime  senna 2 Tablet(s) Oral at bedtime    MEDICATIONS  (PRN):  aluminum hydroxide/magnesium hydroxide/simethicone Suspension 30 milliLiter(s) Oral every 4 hours PRN Dyspepsia  haloperidol     Tablet 5 milliGRAM(s) Oral every 6 hours PRN agitation  melatonin 3 milliGRAM(s) Oral at bedtime PRN Insomnia  ondansetron Injectable 4 milliGRAM(s) IV Push every 8 hours PRN Nausea and/or Vomiting  oxyCODONE    IR 10 milliGRAM(s) Oral every 4 hours PRN Severe Pain (7 - 10)           Patient Name: CORINNE FARMER  Patient MRN: [id number]    Pt is a 56y yo Male admitted for Patient is a 56y old  Male who presents with a chief complaint of hip pain (24 Jul 2022 07:44)      **SUBJECTIVE**    Last 24 hours: Overnight, patient required oxycodone for pain management. Otherwise, no acute overnight events. This AM, patient laying comfortably in bed. Patient states that he is currently in pain and impatiently waiting for his next dose of oxycodone. States that he "does not agree" with his current medication regimen and that he and doctors currently have a "difference of opinion." Pt also is concerned about the healing of his skin on his right knee, saying that the skin on the rest of his body heals faster so believes something is not right. Pt denies any SOB, chest pain, fever or chills.     Review of Systems  Constitutional: No weakness, fevers, or chills  HEENT: No headache, visual changes, lightheadedness, or sore throat  Respiratory: No shortness of breath, cough, wheezing, or hemoptysis  Cardiovascular: No chest pain or palpitations  GI: No abdominal or epigastric pain. No nausea, vomiting, or change in bowel habits. No hematemesis or hematochezia  Neuro: No numbness or weakness  Skin: No itching or rashes      **OBJECTIVE**    Physical Exam  T(C): 37.3 (07-25-22 @ 05:17), Max: 37.3 (07-25-22 @ 05:17)  HR: 79 (07-25-22 @ 05:17) (79 - 85)  BP: 124/78 (07-25-22 @ 05:17) (118/69 - 135/98)  RR: 17 (07-25-22 @ 05:17) (16 - 17)  SpO2: 100% (07-25-22 @ 05:17) (100% - 100%)    GENERAL: NAD  HEAD:  Atraumatic, Normocephalic  EYES: EOMI, conjunctiva and sclera clear  CHEST/LUNG: Clear to percussion bilaterally; No rales, rhonchi, wheezing, or rubs  HEART: Regular rate and rhythm; No murmurs, rubs, or gallops  ABDOMEN: Soft, Nontender, Nondistended;   SKIN: No rashes or lesions  NERVOUS SYSTEM:  Alert & Oriented X3, no focal deficit  EXT: R knee ACE bandage, LLE medial thigh incision bandage c/d/i    Labs  07-25    138  |  106  |  12  ----------------------------<  95  4.2   |  25  |  0.60    Ca    8.6      25 Jul 2022 06:00  Phos  2.9     07-25  Mg     2.00     07-25      CBC Full  -  ( 25 Jul 2022 06:00 )  WBC Count : 6.65 K/uL  RBC Count : 2.99 M/uL  Hemoglobin : 7.9 g/dL  Hematocrit : 25.6 %  Platelet Count - Automated : 285 K/uL  Mean Cell Volume : 85.6 fL  Mean Cell Hemoglobin : 26.4 pg  Mean Cell Hemoglobin Concentration : 30.9 gm/dL  Auto Neutrophil # : x  Auto Lymphocyte # : x  Auto Monocyte # : x  Auto Eosinophil # : x  Auto Basophil # : x  Auto Neutrophil % : x  Auto Lymphocyte % : x  Auto Monocyte % : x  Auto Eosinophil % : x  Auto Basophil % : x              POC Glucose  CAPILLARY BLOOD GLUCOSE          I&O's  I&O's Summary    24 Jul 2022 07:01  -  25 Jul 2022 07:00  --------------------------------------------------------  IN: 0 mL / OUT: 400 mL / NET: -400 mL        UA (if applicable)      Micro (if applicable)      Imaging (if applicable)    Active Medications  MEDICATIONS  (STANDING):  acetaminophen     Tablet .. 975 milliGRAM(s) Oral every 8 hours  bisacodyl Suppository 10 milliGRAM(s) Rectal daily  enoxaparin Injectable 40 milliGRAM(s) SubCutaneous every 24 hours  polyethylene glycol 3350 17 Gram(s) Oral two times a day  risperiDONE   Tablet 1 milliGRAM(s) Oral at bedtime  senna 2 Tablet(s) Oral at bedtime    MEDICATIONS  (PRN):  aluminum hydroxide/magnesium hydroxide/simethicone Suspension 30 milliLiter(s) Oral every 4 hours PRN Dyspepsia  haloperidol     Tablet 5 milliGRAM(s) Oral every 6 hours PRN agitation  melatonin 3 milliGRAM(s) Oral at bedtime PRN Insomnia  ondansetron Injectable 4 milliGRAM(s) IV Push every 8 hours PRN Nausea and/or Vomiting  oxyCODONE    IR 10 milliGRAM(s) Oral every 4 hours PRN Severe Pain (7 - 10)

## 2022-07-25 NOTE — DIETITIAN INITIAL EVALUATION ADULT - PERTINENT LABORATORY DATA
07-25    138  |  106  |  12  ----------------------------<  95  4.2   |  25  |  0.60    Ca    8.6      25 Jul 2022 06:00  Phos  2.9     07-25  Mg     2.00     07-25    A1C with Estimated Average Glucose Result: 5.2 % (07-22-22 @ 06:10)

## 2022-07-25 NOTE — PROGRESS NOTE ADULT - ASSESSMENT
57 y/o M w PMH of paranoid schizophrenia (with multiple hospitalizations) presents with acute left hip fracture, s/p IMN 7/17. Psych consult for medication mgmt of schizophrenia. Medically optimized pending placement to [facility]

## 2022-07-25 NOTE — PROGRESS NOTE ADULT - PROBLEM SELECTOR PLAN 2
Patient with Hgb --- this AM in the setting of recent L IMN 7/17 and hgb 10.3 on presentation.   likely 2/2 post operative hypovolemic status 2/2 anesthesia agents. Low suspicion for acute bleed at this time as patient has no symptoms   - CBC this AM 7.9; no indication to transfuse at this time. patient remains asymptomatic  - iron studies wnl, B12 and folate wnl; would recommend screening colonoscopy on dc regardless of iron study results  - leg exam: negative for pain, poikilothermia, paresthesia, pulselessness and pallor. at this time, low concern for acute compartment symptom of the leg  - monitor  - transfuse if Hgb < 7; consent in chart.

## 2022-07-25 NOTE — DIETITIAN INITIAL EVALUATION ADULT - PERTINENT MEDS FT
MEDICATIONS  (STANDING):  acetaminophen     Tablet .. 975 milliGRAM(s) Oral every 8 hours  bisacodyl Suppository 10 milliGRAM(s) Rectal daily  enoxaparin Injectable 40 milliGRAM(s) SubCutaneous every 24 hours  polyethylene glycol 3350 17 Gram(s) Oral two times a day  risperiDONE   Tablet 1 milliGRAM(s) Oral at bedtime  senna 2 Tablet(s) Oral at bedtime    MEDICATIONS  (PRN):  aluminum hydroxide/magnesium hydroxide/simethicone Suspension 30 milliLiter(s) Oral every 4 hours PRN Dyspepsia  haloperidol     Tablet 5 milliGRAM(s) Oral every 6 hours PRN agitation  melatonin 3 milliGRAM(s) Oral at bedtime PRN Insomnia  ondansetron Injectable 4 milliGRAM(s) IV Push every 8 hours PRN Nausea and/or Vomiting  oxyCODONE    IR 10 milliGRAM(s) Oral every 4 hours PRN Severe Pain (7 - 10)

## 2022-07-26 LAB
ALBUMIN SERPL ELPH-MCNC: 3.4 G/DL — SIGNIFICANT CHANGE UP (ref 3.3–5)
ALP SERPL-CCNC: 132 U/L — HIGH (ref 40–120)
ALT FLD-CCNC: 21 U/L — SIGNIFICANT CHANGE UP (ref 4–41)
ANION GAP SERPL CALC-SCNC: 9 MMOL/L — SIGNIFICANT CHANGE UP (ref 7–14)
AST SERPL-CCNC: 17 U/L — SIGNIFICANT CHANGE UP (ref 4–40)
BILIRUB SERPL-MCNC: 0.8 MG/DL — SIGNIFICANT CHANGE UP (ref 0.2–1.2)
BUN SERPL-MCNC: 12 MG/DL — SIGNIFICANT CHANGE UP (ref 7–23)
CALCIUM SERPL-MCNC: 8.7 MG/DL — SIGNIFICANT CHANGE UP (ref 8.4–10.5)
CHLORIDE SERPL-SCNC: 104 MMOL/L — SIGNIFICANT CHANGE UP (ref 98–107)
CO2 SERPL-SCNC: 23 MMOL/L — SIGNIFICANT CHANGE UP (ref 22–31)
CREAT SERPL-MCNC: 0.6 MG/DL — SIGNIFICANT CHANGE UP (ref 0.5–1.3)
EGFR: 113 ML/MIN/1.73M2 — SIGNIFICANT CHANGE UP
GLUCOSE SERPL-MCNC: 101 MG/DL — HIGH (ref 70–99)
HCT VFR BLD CALC: 27 % — LOW (ref 39–50)
HGB BLD-MCNC: 8.4 G/DL — LOW (ref 13–17)
MAGNESIUM SERPL-MCNC: 2.1 MG/DL — SIGNIFICANT CHANGE UP (ref 1.6–2.6)
MCHC RBC-ENTMCNC: 26.9 PG — LOW (ref 27–34)
MCHC RBC-ENTMCNC: 31.1 GM/DL — LOW (ref 32–36)
MCV RBC AUTO: 86.5 FL — SIGNIFICANT CHANGE UP (ref 80–100)
NRBC # BLD: 0 /100 WBCS — SIGNIFICANT CHANGE UP
NRBC # FLD: 0 K/UL — SIGNIFICANT CHANGE UP
PHOSPHATE SERPL-MCNC: 3.3 MG/DL — SIGNIFICANT CHANGE UP (ref 2.5–4.5)
PLATELET # BLD AUTO: 304 K/UL — SIGNIFICANT CHANGE UP (ref 150–400)
POTASSIUM SERPL-MCNC: 3.9 MMOL/L — SIGNIFICANT CHANGE UP (ref 3.5–5.3)
POTASSIUM SERPL-SCNC: 3.9 MMOL/L — SIGNIFICANT CHANGE UP (ref 3.5–5.3)
PROT SERPL-MCNC: 6.4 G/DL — SIGNIFICANT CHANGE UP (ref 6–8.3)
RBC # BLD: 3.12 M/UL — LOW (ref 4.2–5.8)
RBC # FLD: 16.4 % — HIGH (ref 10.3–14.5)
SODIUM SERPL-SCNC: 136 MMOL/L — SIGNIFICANT CHANGE UP (ref 135–145)
WBC # BLD: 6.62 K/UL — SIGNIFICANT CHANGE UP (ref 3.8–10.5)
WBC # FLD AUTO: 6.62 K/UL — SIGNIFICANT CHANGE UP (ref 3.8–10.5)

## 2022-07-26 PROCEDURE — 99232 SBSQ HOSP IP/OBS MODERATE 35: CPT

## 2022-07-26 PROCEDURE — 99233 SBSQ HOSP IP/OBS HIGH 50: CPT

## 2022-07-26 PROCEDURE — 99231 SBSQ HOSP IP/OBS SF/LOW 25: CPT | Mod: GC

## 2022-07-26 PROCEDURE — 93970 EXTREMITY STUDY: CPT | Mod: 26

## 2022-07-26 RX ORDER — IBUPROFEN 200 MG
400 TABLET ORAL EVERY 6 HOURS
Refills: 0 | Status: DISCONTINUED | OUTPATIENT
Start: 2022-07-26 | End: 2022-08-08

## 2022-07-26 RX ORDER — PANTOPRAZOLE SODIUM 20 MG/1
40 TABLET, DELAYED RELEASE ORAL
Refills: 0 | Status: DISCONTINUED | OUTPATIENT
Start: 2022-07-26 | End: 2022-08-08

## 2022-07-26 RX ADMIN — Medication 400 MILLIGRAM(S): at 20:03

## 2022-07-26 RX ADMIN — Medication 400 MILLIGRAM(S): at 08:05

## 2022-07-26 RX ADMIN — Medication 975 MILLIGRAM(S): at 21:20

## 2022-07-26 RX ADMIN — Medication 400 MILLIGRAM(S): at 19:03

## 2022-07-26 RX ADMIN — Medication 975 MILLIGRAM(S): at 13:23

## 2022-07-26 RX ADMIN — ENOXAPARIN SODIUM 40 MILLIGRAM(S): 100 INJECTION SUBCUTANEOUS at 18:21

## 2022-07-26 RX ADMIN — Medication 400 MILLIGRAM(S): at 09:05

## 2022-07-26 RX ADMIN — Medication 975 MILLIGRAM(S): at 06:43

## 2022-07-26 NOTE — PROGRESS NOTE ADULT - PROBLEM SELECTOR PLAN 4
Fluids: NA  - Electrolytes: Will replete to maintain K>4, Phos>3, and Mag>2  - Nutrition: regular  - Activity: with PT support  - DVT Prophylaxis: lovenox  - Stress Ulcer/GI Prophylaxis: NA  - Disposition: acute rehab pending. Fluids: NA  - Electrolytes: Will replete to maintain K>4, Phos>3, and Mag>2  - Nutrition: regular  - Activity: with PT support  - DVT Prophylaxis: lovenox  - Stress Ulcer/GI Prophylaxis: protonix 40mg  - Disposition: acute rehab pending.

## 2022-07-26 NOTE — BH CONSULTATION LIAISON PROGRESS NOTE - NSBHINDICATION_PSY_ALL_CORE
see above , to be determined by primary team 

## 2022-07-26 NOTE — PROGRESS NOTE ADULT - PROBLEM SELECTOR PLAN 3
Currently stable and not a harm to self or others. Not using any medications at home.  -currently calm and re-directable but showing some signs of paranoia regarding his treatment regimen   - Qt/Qtc 398/438 ms from EKG 7/16  - haldol 5mg prn for acute agitation  - c/w risperdal 1 mg qhs per psych  - psych reconsulted for re-assessment of symptoms, response to treatment Currently stable and not a harm to self or others. Not using any medications at home.  -currently calm and re-directable but showing some signs of paranoia regarding his treatment regimen   - Qt/Qtc 398/438 ms from EKG 7/16  - haldol 5mg prn for acute agitation  - c/w risperdal 1 mg qhs per psych  - Appreciate psych recs re: tx

## 2022-07-26 NOTE — BH CONSULTATION LIAISON PROGRESS NOTE - NSBHFUPINTERVALCCFT_PSY_A_CORE
Patient seen for f/u of capacity, h/o schizoaffective disorder
Patient seen for f/u of capacity eval
Patient seen for f/u of capacity, h/o schizoaffective disorder
Patient seen for f/u of capacity, h/o schizoaffective disorder

## 2022-07-26 NOTE — BH CONSULTATION LIAISON PROGRESS NOTE - NSBHFUPINTERVALHXFT_PSY_A_CORE
VSS, no PRNs overnight, no acute events. Patient no longer taking risperidal as of 7/23    Patient states he feels good, says he didn't get great sleep overnight due to interruptions from staff, but says that he feels well rested. Discussed home life, he says he spends most of his time reading, analyzing magazines about science innovation, says he sends suggestions about innovations to the writers. He states that his relationship with his mother is tenuous, as she went through 2 marriages, says he doesn't get involved with her much. He reports good appetite, no AVH, SI/HI, paranoia, depression, or anxiety. He continues to state that he tore his ligament and not his bone, but says his pain is improving greatly. Discussed Risperdal, which patient states he would not like to further increase.  VSS, no PRNs overnight, no acute events. Patient no longer taking risperidal as of 7/23. He says the [facility] court disagreed with the need for that medication base on his history.     Patient states he feels okay. He is eating well. He has been sleeping only a few hours a night. He says the [facility] has studies that people do not need 8 hours of sleep per night. He says his leg is improving saying he can move it "26%". He says he feels less pain. He reports no AVH, SI/HI, paranoia, depression, or anxiety. Pt is perseverative regarding the [facility] court. He says he is the plaintiff in many cases and that they are in contact with [facility] regarding his treatment.  VSS, no PRNs overnight, no acute events. Patient refusing risperidal as of 7/23. He says the [employer] court disagreed with the need for that medication base on his history.     Patient states he feels okay. He is eating well. He has been sleeping only a few hours a night. He says the [facility] has studies that people do not need 8 hours of sleep per night. He says his leg is improving saying he can move it "26%". He says he feels less pain. He reports no AVH, SI/HI, paranoia, depression, or anxiety. Pt is perseverative regarding the [facility] court. He says he is the plaintiff in many cases and that they are in contact with [facility] regarding his treatment.

## 2022-07-26 NOTE — BH CONSULTATION LIAISON PROGRESS NOTE - NSBHCONSULTFOLLOWAFTERCARE_PSY_A_CORE FT
[] patient should have psychiatry see him while he is at rehab to help determine if risperidone can be further titrated as well as help further establish outpatient care    Patient can follow up with LISA FAY at [phone] Patient can follow up with LISA FAY at [phone]

## 2022-07-26 NOTE — PROGRESS NOTE ADULT - ASSESSMENT
Assessment/Plan: 55 y/o M w PMH of paranoid schizophrenia (with multiple hospitalizations) presents with acute left hip fracture, s/p IMN 7/17.     Wound f/u for right knee trauma wound s/p fall.     Right knee trauma wound  - wound base improving, decreased necrotic tissue, increase granulation, beginning to re-epithelialize along wound edges.  -No associated cellulitis   -Continue current management to autolyticly debride with Medihoney gel   -X rays of bilateral knee reports left proximal femur fracture (7/16)  -Topical Recommendations: Clean with NS. Pat dry. Apply liquid barrier film to periwound skin, apply Medihoney gel to wound bed, cover with silicone foam dressing. Change daily or PRN if soiled.   -Reinforced with patient to f/u at wound care center upon discharge, contact information provided.     Left trochanter surgical incision s/p IMN  -dressing c/d/i management per Orthopedic surgery, please f/u for surgical site instructions.  -LLE with nonpitting edema  -Consider Venous duplex r/o DVT, low suspicion. Patient receiving enoxaparin, negative Homans, no erythema. However, would still consider baseline duplex. Minimal mobile, nonpitting edema. Per patient minimal improvement if any.  If negative, apply ACE wrap to LLE and elevation. Change daily.      Bilateral elbows healed skin abrasions   -Keep dry and clean   -Wash with soap and water. daily.     Risk for moisture associated dermatitis   -Clean with skin cleanser. Pat dry. Apply a thin layer of umm barrier cream, apply twice a day or PRN.     Additional Recommendations   Nutrition Consult for optimization as tolerated        consider MVI & Vit C to promote wound healing        Receiving ensure enlive  Continue w/ Pericare as per protocol  Waffle Cushion to chair when oob to chair  Continue w/ low air loss fluidized bed surface   Continue turning and positioning w/ offloading assistive devices as per protocol while in bed  Continue offloading heels; complete cair boots.    Upon discharge f/u as outpatient at [facility] [address] [facility] [phone]  Findings and plan discussed with primary team Dr. Mojica. All questions and concerns addressed to meet patient's satisfaction.   Will f/u periodically throughout hospitalization please reconsult earlier if needed.    IRIS Painting-BC, CWOC    pager #[phone]    If after 4PM or before 7:30AM on Mon-Friday or weekend/holiday please contact general surgery for urgent matters.   Team A- [id number]   Team B- [id number]  For non-urgent matters e-mail [email or web address]    I spent 35 minutes face-to-face with this patient of which more than 50% of the time was spent counseling/coordinating care of this patient.

## 2022-07-26 NOTE — BH CONSULTATION LIAISON PROGRESS NOTE - NSBHMSETHTPROC_PSY_A_CORE
Overinclusive/Perseverative/Impaired reasoning

## 2022-07-26 NOTE — BH CONSULTATION LIAISON PROGRESS NOTE - NSBHCHARTREVIEWVS_PSY_A_CORE FT
Vital Signs Last 24 Hrs  T(C): 36.8 (26 Jul 2022 05:15), Max: 36.9 (25 Jul 2022 12:34)  T(F): 98.3 (26 Jul 2022 05:15), Max: 98.4 (25 Jul 2022 12:34)  HR: 73 (26 Jul 2022 05:15) (73 - 82)  BP: 126/71 (26 Jul 2022 05:15) (109/73 - 126/71)  BP(mean): --  RR: 17 (26 Jul 2022 05:15) (17 - 18)  SpO2: 100% (26 Jul 2022 05:15) (100% - 100%)    Parameters below as of 26 Jul 2022 05:15  Patient On (Oxygen Delivery Method): room air    
Vital Signs Last 24 Hrs  T(C): 36.8 (18 Jul 2022 11:54), Max: 36.9 (18 Jul 2022 05:25)  T(F): 98.3 (18 Jul 2022 11:54), Max: 98.4 (18 Jul 2022 05:25)  HR: 90 (18 Jul 2022 11:54) (66 - 94)  BP: 109/69 (18 Jul 2022 11:54) (109/69 - 141/89)  BP(mean): 88 (17 Jul 2022 19:00) (71 - 102)  RR: 17 (18 Jul 2022 11:54) (11 - 18)  SpO2: 100% (18 Jul 2022 11:54) (97% - 100%)    Parameters below as of 18 Jul 2022 11:54  Patient On (Oxygen Delivery Method): room air    
Vital Signs Last 24 Hrs  T(C): 36.7 (20 Jul 2022 05:10), Max: 37.2 (19 Jul 2022 13:00)  T(F): 98 (20 Jul 2022 05:10), Max: 98.9 (19 Jul 2022 13:00)  HR: 82 (20 Jul 2022 05:10) (72 - 86)  BP: 115/67 (20 Jul 2022 05:10) (112/60 - 116/65)  BP(mean): --  RR: 18 (20 Jul 2022 05:10) (16 - 18)  SpO2: 98% (20 Jul 2022 05:10) (98% - 100%)    Parameters below as of 20 Jul 2022 05:10  Patient On (Oxygen Delivery Method): room air    
Vital Signs Last 24 Hrs  T(C): 36.8 (20 Jul 2022 21:54), Max: 36.8 (20 Jul 2022 21:54)  T(F): 98.3 (20 Jul 2022 21:54), Max: 98.3 (20 Jul 2022 21:54)  HR: 81 (20 Jul 2022 21:54) (81 - 92)  BP: 134/65 (20 Jul 2022 21:54) (115/68 - 134/65)  BP(mean): --  RR: 17 (20 Jul 2022 21:54) (17 - 17)  SpO2: 100% (20 Jul 2022 21:54) (98% - 100%)    Parameters below as of 20 Jul 2022 21:54  Patient On (Oxygen Delivery Method): room air

## 2022-07-26 NOTE — BH CONSULTATION LIAISON PROGRESS NOTE - NSBHASSESSMENTFT_PSY_ALL_CORE
Patient is a 57 y/o M w PSH of tonsillectomy, no prevalent PMH, PPHx of schizophrenia, presents with acute left hip fracture. Patient has a hx of inpatient psychiatric admissions, as per chart review patient with multiple inpatient admission, patient has 2 admission to [facility] in 2008 and 2015. It is noted that patient has a hx of being on haldol 5mg TID and cogentin, however currently on no medications. Patient has no known SA, no hx of violence noted in chart review, no reported substance abuse. Patient not in current psychiatric treatment. Psychiatry called for capacity as patient is refusing recommended surgical repair of hip fracture. Determined patient lacked capacity to refuse surgical intervention, recommend family involvement in decision making.    7/18: Patient received surgery, though he continues to disagree with fact that he needed it. He is agreeable of dispo to rehab. Attempted to engage in discussion with patient to start antipsychotic, though he continues to lack insight/judgement, denies psych history. Would offer antipsychotic though he may refuse.  7/20: Patient calm, cooperative, still perseverative on [facility], still somewhat oddly related, though remains in behavioral control. He is compliant with standing Risperdal and states meds are calming for him.   7/21: Patient continues to be oddly related, shows signs of grandiosity, though remains in behavioral control, no active hallucinations, SI, or mood disorder noted. Patient compliant with Risperdal, however seems reluctant on further increase.     PLAN:  - C/w Risperdal to 1mg qhs as patient reported that he would not like to increase it   - Please obtain A1c, lipid panel   - Patient currently calm, behavior in control, chronic delusions  - antipsychotics can only be given if qtc < 500   - PRN for agitation, haldol 5mg q6hrs if qtc < 500  - no SI or HI. defer observational status to primary team   - Patient currently does not meet criteria for inpatient psychiatric hospitalization, as he is not an acute danger to himself or others    Dispo [] patient should have psychiatry see him while he is at rehab to help determine if risperidone can be further titrated as well as help further establish outpatient care Patient is a 57 y/o M w PSH of tonsillectomy, no prevalent PMH, PPHx of schizophrenia, presents with acute left hip fracture. Patient has a hx of inpatient psychiatric admissions, as per chart review patient with multiple inpatient admission, patient has 2 admission to [facility] in 2008 and 2015. It is noted that patient has a hx of being on haldol 5mg TID and cogentin, however currently on no medications. Patient has no known SA, no hx of violence noted in chart review, no reported substance abuse. Patient not in current psychiatric treatment. Psychiatry called for capacity as patient is refusing recommended surgical repair of hip fracture. Determined patient lacked capacity to refuse surgical intervention, recommend family involvement in decision making.    7/18: Patient received surgery, though he continues to disagree with fact that he needed it. He is agreeable of dispo to rehab. Attempted to engage in discussion with patient to start antipsychotic, though he continues to lack insight/judgement, denies psych history. Would offer antipsychotic though he may refuse.  7/20: Patient calm, cooperative, still perseverative on [facility], still somewhat oddly related, though remains in behavioral control. He is compliant with standing Risperdal and states meds are calming for him.   7/21: Patient continues to be oddly related, shows signs of grandiosity, though remains in behavioral control, no active hallucinations, SI, or mood disorder noted. Patient compliant with Risperdal, however seems reluctant on further increase.   7/26: Pt is no longer taking risperidal. He continues to have delusions regarding his injury. He is perseverative regarding court cases. No current SI/HI, AH/VH or paranoia.    PLAN:  - C/w Risperdal to 1mg qhs as patient reported that he would not like to increase it   - Please obtain A1c, lipid panel   - Patient currently calm, behavior in control, chronic delusions  - antipsychotics can only be given if qtc < 500   - PRN for agitation, haldol 5mg q6hrs if qtc < 500  - no SI or HI. defer observational status to primary team   - Patient currently does not meet criteria for inpatient psychiatric hospitalization, as he is not an acute danger to himself or others    Dispo [] patient should have psychiatry see him while he is at rehab to help determine if risperidone can be further titrated as well as help further establish outpatient care Patient is a 55 y/o M w PSH of tonsillectomy, no prevalent PMH, PPHx of schizophrenia, presents with acute left hip fracture. Patient has a hx of inpatient psychiatric admissions, as per chart review patient with multiple inpatient admission, patient has 2 admission to [facility] in 2008 and 2015. It is noted that patient has a hx of being on haldol 5mg TID and cogentin, however currently on no medications. Patient has no known SA, no hx of violence noted in chart review, no reported substance abuse. Patient not in current psychiatric treatment. Psychiatry called for capacity as patient is refusing recommended surgical repair of hip fracture. Determined patient lacked capacity to refuse surgical intervention, recommend family involvement in decision making.    7/18: Patient received surgery, though he continues to disagree with fact that he needed it. He is agreeable of dispo to rehab. Attempted to engage in discussion with patient to start antipsychotic, though he continues to lack insight/judgement, denies psych history. Would offer antipsychotic though he may refuse.  7/20: Patient calm, cooperative, still perseverative on [facility], still somewhat oddly related, though remains in behavioral control. He is compliant with standing Risperdal and states meds are calming for him.   7/21: Patient continues to be oddly related, shows signs of grandiosity, though remains in behavioral control, no active hallucinations, SI, or mood disorder noted. Patient compliant with Risperdal, however seems reluctant on further increase.   7/26: Pt is no longer taking risperidal. He continues to have delusions regarding his injury. He is perseverative regarding court cases. No current SI/HI, AH/VH or paranoia.    PLAN:  - C/w Risperdal to 1mg qhs, though pt has right to refuse and there is no criterion here for involuntary care  - Patient currently calm, behavior in control, evidencing chronic delusions  - PRN for agitation, haldol 5mg q6hrs if qtc < 500  - no SI or HI. defer observational status to primary team   - Patient currently does not meet criteria for inpatient psychiatric hospitalization, as he is not an acute danger to himself or others    Dispo: rehab/NH as indicated by medical team. Patient is a 55 y/o M w PSH of tonsillectomy, no prevalent PMH, PPHx of schizophrenia, presents with acute left hip fracture. Patient has a hx of inpatient psychiatric admissions, as per chart review patient with multiple inpatient admission, patient has 2 admission to [facility] in 2008 and 2015. It is noted that patient has a hx of being on haldol 5mg TID and cogentin, however currently on no medications. Patient has no known SA, no hx of violence noted in chart review, no reported substance abuse. Patient not in current psychiatric treatment. Psychiatry called for capacity as patient is refusing recommended surgical repair of hip fracture. Determined patient lacked capacity to refuse surgical intervention, recommend family involvement in decision making.    7/18: Patient received surgery, though he continues to disagree with fact that he needed it. He is agreeable of dispo to rehab. Attempted to engage in discussion with patient to start antipsychotic, though he continues to lack insight/judgement, denies psych history. Would offer antipsychotic though he may refuse.  7/20: Patient calm, cooperative, still perseverative on [facility], still somewhat oddly related, though remains in behavioral control. He is compliant with standing Risperdal and states meds are calming for him.   7/21: Patient continues to be oddly related, shows signs of grandiosity, though remains in behavioral control, no active hallucinations, SI, or mood disorder noted. Patient compliant with Risperdal, however seems reluctant on further increase.   7/26: Pt is refusing risperdal. He continues to have delusions regarding his injury and grandiosity. He is perseverative regarding court cases. No current SI/HI, AH/VH or paranoia.    PLAN:  - C/w offering Risperdal to 1mg qhs, though pt has right to refuse and there is no criterion here for involuntary care  - Patient currently calm, behavior in control, evidencing chronic delusions  - PRN for agitation, haldol 5mg q6hrs if qtc < 500  - no SI or HI. defer observational status to primary team   - Patient currently does not meet criteria for inpatient psychiatric hospitalization, as he is not an acute danger to himself or others    Dispo: rehab/NH as indicated by medical team.

## 2022-07-26 NOTE — BH CONSULTATION LIAISON PROGRESS NOTE - NSBHATTESTCOMMENTATTENDFT_PSY_A_CORE
Chart reviewed. I personally saw and examined the patient with Dr. Ken. I agree with his history, MSE, A/P with below additions. In brief, patient remains delusional, has a zfnxffnrwy-lf-pjuscxfpfabo thought process, however has a euthymic affect and is not particularly psychomotor agitated or retarded. His insight is poor. He agrees to go to rehab which shows good judgement but continues to refuse scheduled antipsychotic.  This psychosis is likely at baseline and he seems to be able to advocate for himself. Furthermore, since he will refuse antipsychotic, at this point rehab is likely going to be more beneficial in the short term than inpatient psych where he wont be able to get the extensive rehab and will just refuse antipsychotics until he is likely discharged by a  as he is not dangerous to others or self.  Plan per above.
Chart reviewed. I agree with Dr. Ken's history, MSE, A/P with below additions. I personally saw and examined the patient this AM.  In brief, patient remains delusional, has a rhdiozphpo-fa-nncsvolydszx thought process, however has a euthymic affect and is not particularly psychomotor agitated or retarded. His insight is poor. He continues to agree to go to rehab which shows good judgement.  Can continue to offer risperidone and would try to titrate it while he is in house- though he is allowed to refuse.  This psychosis is likely at baseline and he seems to be able to advocate for himself. At this point rehab is likely going to be more beneficial in the short term than inpatient psych where he wont be able to get the extensive rehab and will just refuse antipsychotics until he is likely discharged by a  as he is not dangerous to others or self.  
Chart reviewed. Pt pleasant, calm, in NAD, and not actively agitated or aggressive. Complies with all care. Is looking forward to inpatient rehab as needed for recovery. Denies SI, HI, AVH, paranoia/safety concerns. Chronic delusions are benign in nature and he is redirectable. He is fully oriented as well. Aree with above assessment/recs. There is no psych indication for involuntary care, inpatient care, or 1:1 observation. No psych contraindications to discharge when medically cleared. Will sign off as acuity is low. Please call if questions arise.

## 2022-07-26 NOTE — PROGRESS NOTE ADULT - SUBJECTIVE AND OBJECTIVE BOX
[facility]-- WOUND TEAM -- FOLLOW UP NOTE  --------------------------------------------------------------------------------    subjective: Patient seen while sitting up in bed. No new complaints. Denies pain/tenderness to left leg surgical site reports that swelling of the left leg remains. Denies pain and/or tenderness to right knee wound.     Interval HPI/24 hour events: no acute events overnight.    Chart reviewed including labs and relevant images      Diet:  Diet, Regular:   Supplement Feeding Modality:  Oral  Ensure Enlive Cans or Servings Per Day:  1       Frequency:  Daily (07-25-22 @ 16:05)      ROS: skin see above  all other systems negative.     ALLERGIES & MEDICATIONS  --------------------------------------------------------------------------------  Allergies    lactose (Unknown)  No Known Drug Allergies    Intolerances          STANDING INPATIENT MEDICATIONS    acetaminophen     Tablet .. 975 milliGRAM(s) Oral every 8 hours  bisacodyl Suppository 10 milliGRAM(s) Rectal daily  enoxaparin Injectable 40 milliGRAM(s) SubCutaneous every 24 hours  pantoprazole    Tablet 40 milliGRAM(s) Oral before breakfast  polyethylene glycol 3350 17 Gram(s) Oral two times a day  risperiDONE   Tablet 1 milliGRAM(s) Oral at bedtime  senna 2 Tablet(s) Oral at bedtime      PRN INPATIENT MEDICATION  aluminum hydroxide/magnesium hydroxide/simethicone Suspension 30 milliLiter(s) Oral every 4 hours PRN  haloperidol     Tablet 5 milliGRAM(s) Oral every 6 hours PRN  ibuprofen  Tablet. 400 milliGRAM(s) Oral every 6 hours PRN  melatonin 3 milliGRAM(s) Oral at bedtime PRN  ondansetron Injectable 4 milliGRAM(s) IV Push every 8 hours PRN  oxyCODONE    IR 10 milliGRAM(s) Oral every 4 hours PRN        Vital signs:  T(C): 36.8 (07-26-22 @ 05:15), Max: 36.9 (07-25-22 @ 12:34)  HR: 73 (07-26-22 @ 05:15) (73 - 82)  BP: 126/71 (07-26-22 @ 05:15) (109/73 - 126/71)  RR: 17 (07-26-22 @ 05:15) (17 - 18)  SpO2: 100% (07-26-22 @ 05:15) (100% - 100%)  Wt(kg): 76.7 kg (07-17-22)      Constitutional: NAD/ A&Ox3/ Alert  (+) low airloss support surface, (+) fluidized positioning devices, heels offloaded   HEENT:  NC/AT, non icteric, mucosa moist, throat clear, trachea midline, neck supple  Cardiovascular: Rate regular   Respiratory: Equal chest expansion, non labored, room air   Gastrointestinal: soft, nt/nd   Neurology: follows commands  Musculoskeletal:  b/l ue FROM. Able to lift legs off bed independently. No deformities/ contractures.   Vascular: B/l LE equally warm, no cyanosis, clubbing. DP/PT 2+ pulses palpable, capillary refill < 3 seconds.  no overt ischemia noted.  Left leg nonpitting edema; no erythema, no ecchymosis no increased warmth, (-) homans sign.   Skin:  moist w/ good turgor  Bilateral elbows with healed skin abrasions, no open ulcers, no drainage.   Left trochanter with intact clean, dry dressing s/p  Intramedullary nailing of left femur on 7/17 by ortho.   Right knee trauma wound: [id number] (prev 8cmx4.5cmx0.2cm). 90% pink-moist granular base, 5% grey tan-moistening firmly attached slough, 5% re-epithelialization migrating circumferentially along wound edges. Scant serous drainage. No odor.  No associated cellulitis. No increased warmth, no erythema, no induration. Medihoney and silicone foam applied.   Psych: calm, cooperative       LABS/ CULTURES/ RADIOLOGY:              8.4    6.62  >-----------<  304      [07-26-22 @ 06:30]              27.0     136  |  104  |  12  ----------------------------<  101      [07-26-22 @ 06:30]  3.9   |  23  |  0.60        Ca     8.7     [07-26-22 @ 06:30]      Mg     2.10     [07-26-22 @ 06:30]      Phos  3.3     [07-26-22 @ 06:30]    TPro  6.4  /  Alb  3.4  /  TBili  0.8  /  DBili  x   /  AST  17  /  ALT  21  /  AlkPhos  132  [07-26-22 @ 06:30]      Triglycerides, Serum: 86 mg/dL (07-22-22 @ 06:10)    A1C with Estimated Average Glucose Result: 5.2 % (07-22-22 @ 06:10)

## 2022-07-26 NOTE — PROGRESS NOTE ADULT - SUBJECTIVE AND OBJECTIVE BOX
Patient Name: CORINNE FARMER  Patient MRN: [id number]    Pt is a 56y yo Male admitted for Patient is a 56y old  Male who presents with a chief complaint of Fracture of unspecified part of neck of unspecified femur, initial encounter for closed fracture     (25 Jul 2022 12:04)      **SUBJECTIVE**    Last 24 hours:     Review of Systems  Constitutional: No weakness, fevers, or chills  HEENT: No headache, visual changes, lightheadedness, or sore throat  Respiratory: No shortness of breath, cough, wheezing, or hemoptysis  Cardiovascular: No chest pain or palpitations  GI: No abdominal or epigastric pain. No nausea, vomiting, or change in bowel habits. No hematemesis or hematochezia  Neuro: No numbness or weakness  Skin: No itching or rashes      **OBJECTIVE**    Vitals  Vital Signs Last 24 Hrs  T(C): 36.8 (26 Jul 2022 05:15), Max: 36.9 (25 Jul 2022 12:34)  T(F): 98.3 (26 Jul 2022 05:15), Max: 98.4 (25 Jul 2022 12:34)  HR: 73 (26 Jul 2022 05:15) (73 - 82)  BP: 126/71 (26 Jul 2022 05:15) (109/73 - 126/71)  BP(mean): --  RR: 17 (26 Jul 2022 05:15) (17 - 18)  SpO2: 100% (26 Jul 2022 05:15) (100% - 100%)    Parameters below as of 26 Jul 2022 05:15  Patient On (Oxygen Delivery Method): room air        Physical Exam  T(C): 36.8 (07-26-22 @ 05:15), Max: 36.9 (07-25-22 @ 12:34)  HR: 73 (07-26-22 @ 05:15) (73 - 82)  BP: 126/71 (07-26-22 @ 05:15) (109/73 - 126/71)  RR: 17 (07-26-22 @ 05:15) (17 - 18)  SpO2: 100% (07-26-22 @ 05:15) (100% - 100%)    Constitutional: No weakness, fevers, or chills  HEENT: No headache, visual changes, lightheadedness, or sore throat  Respiratory: No shortness of breath, cough, wheezing, or hemoptysis  Cardiovascular: No chest pain or palpitations  GI: No abdominal or epigastric pain. No nausea, vomiting, or change in bowel habits. No hematemesis or hematochezia  Neuro: No numbness or weakness  Skin: No itching or rashes    Labs  07-25    138  |  106  |  12  ----------------------------<  95  4.2   |  25  |  0.60    Ca    8.6      25 Jul 2022 06:00  Phos  2.9     07-25  Mg     2.00     07-25      CBC Full  -  ( 25 Jul 2022 06:00 )  WBC Count : 6.65 K/uL  RBC Count : 2.99 M/uL  Hemoglobin : 7.9 g/dL  Hematocrit : 25.6 %  Platelet Count - Automated : 285 K/uL  Mean Cell Volume : 85.6 fL  Mean Cell Hemoglobin : 26.4 pg  Mean Cell Hemoglobin Concentration : 30.9 gm/dL  Auto Neutrophil # : x  Auto Lymphocyte # : x  Auto Monocyte # : x  Auto Eosinophil # : x  Auto Basophil # : x  Auto Neutrophil % : x  Auto Lymphocyte % : x  Auto Monocyte % : x  Auto Eosinophil % : x  Auto Basophil % : x              POC Glucose  CAPILLARY BLOOD GLUCOSE          I&O's  I&O's Summary    24 Jul 2022 07:01  -  25 Jul 2022 07:00  --------------------------------------------------------  IN: 0 mL / OUT: 400 mL / NET: -400 mL        UA (if applicable)      Micro (if applicable)      Imaging (if applicable)    Active Medications  MEDICATIONS  (STANDING):  acetaminophen     Tablet .. 975 milliGRAM(s) Oral every 8 hours  bisacodyl Suppository 10 milliGRAM(s) Rectal daily  enoxaparin Injectable 40 milliGRAM(s) SubCutaneous every 24 hours  polyethylene glycol 3350 17 Gram(s) Oral two times a day  risperiDONE   Tablet 1 milliGRAM(s) Oral at bedtime  senna 2 Tablet(s) Oral at bedtime    MEDICATIONS  (PRN):  aluminum hydroxide/magnesium hydroxide/simethicone Suspension 30 milliLiter(s) Oral every 4 hours PRN Dyspepsia  haloperidol     Tablet 5 milliGRAM(s) Oral every 6 hours PRN agitation  melatonin 3 milliGRAM(s) Oral at bedtime PRN Insomnia  ondansetron Injectable 4 milliGRAM(s) IV Push every 8 hours PRN Nausea and/or Vomiting  oxyCODONE    IR 10 milliGRAM(s) Oral every 4 hours PRN Severe Pain (7 - 10)           Patient Name: CORINNE FARMER  Patient MRN: [id number]    Pt is a 56y yo Male admitted for Patient is a 56y old  Male who presents with a chief complaint of Fracture of unspecified part of neck of unspecified femur, initial encounter for closed fracture     (25 Jul 2022 12:04)      **SUBJECTIVE**    Last 24 hours: No acute events overnight. This AM, patient resting comfortably in bed. States that his current regimen of ibuprofen and tylenol is controlling his pain well. States 2/10 at present.     Review of Systems  Constitutional: No weakness, fevers, or chills  HEENT: No headache, visual changes, lightheadedness, or sore throat  Respiratory: No shortness of breath, cough, wheezing, or hemoptysis  Cardiovascular: No chest pain or palpitations  GI: No abdominal or epigastric pain. No nausea, vomiting, or change in bowel habits. No hematemesis or hematochezia  Neuro: No numbness or weakness  Skin: No itching or rashes      **OBJECTIVE**    Physical Exam  T(C): 36.8 (07-26-22 @ 05:15), Max: 36.9 (07-25-22 @ 12:34)  HR: 73 (07-26-22 @ 05:15) (73 - 82)  BP: 126/71 (07-26-22 @ 05:15) (109/73 - 126/71)  RR: 17 (07-26-22 @ 05:15) (17 - 18)  SpO2: 100% (07-26-22 @ 05:15) (100% - 100%)    Constitutional: No weakness, fevers, or chills  HEENT: No headache, visual changes, lightheadedness, or sore throat  Respiratory: No shortness of breath, cough, wheezing, or hemoptysis  Cardiovascular: No chest pain or palpitations  GI: No abdominal or epigastric pain. No nausea, vomiting, or change in bowel habits. No hematemesis or hematochezia  Neuro: No numbness or weakness  Skin: No itching or rashes    Labs  07-25    138  |  106  |  12  ----------------------------<  95  4.2   |  25  |  0.60    Ca    8.6      25 Jul 2022 06:00  Phos  2.9     07-25  Mg     2.00     07-25      CBC Full  -  ( 25 Jul 2022 06:00 )  WBC Count : 6.65 K/uL  RBC Count : 2.99 M/uL  Hemoglobin : 7.9 g/dL  Hematocrit : 25.6 %  Platelet Count - Automated : 285 K/uL  Mean Cell Volume : 85.6 fL  Mean Cell Hemoglobin : 26.4 pg  Mean Cell Hemoglobin Concentration : 30.9 gm/dL  Auto Neutrophil # : x  Auto Lymphocyte # : x  Auto Monocyte # : x  Auto Eosinophil # : x  Auto Basophil # : x  Auto Neutrophil % : x  Auto Lymphocyte % : x  Auto Monocyte % : x  Auto Eosinophil % : x  Auto Basophil % : x              POC Glucose  CAPILLARY BLOOD GLUCOSE          I&O's  I&O's Summary    24 Jul 2022 07:01  -  25 Jul 2022 07:00  --------------------------------------------------------  IN: 0 mL / OUT: 400 mL / NET: -400 mL        UA (if applicable)      Micro (if applicable)      Imaging (if applicable)    Active Medications  MEDICATIONS  (STANDING):  acetaminophen     Tablet .. 975 milliGRAM(s) Oral every 8 hours  bisacodyl Suppository 10 milliGRAM(s) Rectal daily  enoxaparin Injectable 40 milliGRAM(s) SubCutaneous every 24 hours  polyethylene glycol 3350 17 Gram(s) Oral two times a day  risperiDONE   Tablet 1 milliGRAM(s) Oral at bedtime  senna 2 Tablet(s) Oral at bedtime    MEDICATIONS  (PRN):  aluminum hydroxide/magnesium hydroxide/simethicone Suspension 30 milliLiter(s) Oral every 4 hours PRN Dyspepsia  haloperidol     Tablet 5 milliGRAM(s) Oral every 6 hours PRN agitation  melatonin 3 milliGRAM(s) Oral at bedtime PRN Insomnia  ondansetron Injectable 4 milliGRAM(s) IV Push every 8 hours PRN Nausea and/or Vomiting  oxyCODONE    IR 10 milliGRAM(s) Oral every 4 hours PRN Severe Pain (7 - 10)           Patient Name: CORINNE FARMER  Patient MRN: [id number]    Pt is a 56y yo Male admitted for Patient is a 56y old  Male who presents with a chief complaint of Fracture of unspecified part of neck of unspecified femur, initial encounter for closed fracture     (25 Jul 2022 12:04)      **SUBJECTIVE**    Last 24 hours: No acute events overnight. This AM, patient resting comfortably in bed. States that his current regimen of ibuprofen and tylenol is controlling his pain well. States 2/10 at present.     Review of Systems  Constitutional: No weakness, fevers, or chills  HEENT: No headache, visual changes, lightheadedness, or sore throat  Respiratory: No shortness of breath, cough, wheezing, or hemoptysis  Cardiovascular: No chest pain or palpitations  GI: No abdominal or epigastric pain. No nausea, vomiting, or change in bowel habits. No hematemesis or hematochezia  Neuro: No numbness or weakness  Skin: No itching or rashes      **OBJECTIVE**    Physical Exam  T(C): 36.8 (07-26-22 @ 05:15), Max: 36.9 (07-25-22 @ 12:34)  HR: 73 (07-26-22 @ 05:15) (73 - 82)  BP: 126/71 (07-26-22 @ 05:15) (109/73 - 126/71)  RR: 17 (07-26-22 @ 05:15) (17 - 18)  SpO2: 100% (07-26-22 @ 05:15) (100% - 100%)    Constitutional: No weakness, fevers, or chills. Pt agreeable and calm.   HEENT: No headache, visual changes, lightheadedness, or sore throat  Respiratory: No shortness of breath, cough, wheezing, or hemoptysis  Cardiovascular: No chest pain or palpitations  GI: No abdominal or epigastric pain. No nausea, vomiting, or change in bowel habits. No hematemesis or hematochezia  Neuro: No numbness or weakness  Skin: No itching or rashes    Labs  07-25    138  |  106  |  12  ----------------------------<  95  4.2   |  25  |  0.60    Ca    8.6      25 Jul 2022 06:00  Phos  2.9     07-25  Mg     2.00     07-25      CBC Full  -  ( 25 Jul 2022 06:00 )  WBC Count : 6.65 K/uL  RBC Count : 2.99 M/uL  Hemoglobin : 7.9 g/dL  Hematocrit : 25.6 %  Platelet Count - Automated : 285 K/uL  Mean Cell Volume : 85.6 fL  Mean Cell Hemoglobin : 26.4 pg  Mean Cell Hemoglobin Concentration : 30.9 gm/dL  Auto Neutrophil # : x  Auto Lymphocyte # : x  Auto Monocyte # : x  Auto Eosinophil # : x  Auto Basophil # : x  Auto Neutrophil % : x  Auto Lymphocyte % : x  Auto Monocyte % : x  Auto Eosinophil % : x  Auto Basophil % : x              POC Glucose  CAPILLARY BLOOD GLUCOSE          I&O's  I&O's Summary    24 Jul 2022 07:01  -  25 Jul 2022 07:00  --------------------------------------------------------  IN: 0 mL / OUT: 400 mL / NET: -400 mL        UA (if applicable)      Micro (if applicable)      Imaging (if applicable)    Active Medications  MEDICATIONS  (STANDING):  acetaminophen     Tablet .. 975 milliGRAM(s) Oral every 8 hours  bisacodyl Suppository 10 milliGRAM(s) Rectal daily  enoxaparin Injectable 40 milliGRAM(s) SubCutaneous every 24 hours  polyethylene glycol 3350 17 Gram(s) Oral two times a day  risperiDONE   Tablet 1 milliGRAM(s) Oral at bedtime  senna 2 Tablet(s) Oral at bedtime    MEDICATIONS  (PRN):  aluminum hydroxide/magnesium hydroxide/simethicone Suspension 30 milliLiter(s) Oral every 4 hours PRN Dyspepsia  haloperidol     Tablet 5 milliGRAM(s) Oral every 6 hours PRN agitation  melatonin 3 milliGRAM(s) Oral at bedtime PRN Insomnia  ondansetron Injectable 4 milliGRAM(s) IV Push every 8 hours PRN Nausea and/or Vomiting  oxyCODONE  IR 10 milliGRAM(s) Oral every 4 hours PRN Severe Pain (7 - 10)

## 2022-07-26 NOTE — BH CONSULTATION LIAISON PROGRESS NOTE - NSBHCHARTREVIEWLAB_PSY_A_CORE FT
7.8    8.54  )-----------( 169      ( 20 Jul 2022 06:30 )             25.3   07-20    138  |  103  |  17  ----------------------------<  103<H>  4.3   |  27  |  0.65    Ca    8.8      20 Jul 2022 06:30  Phos  3.2     07-20  Mg     2.00     07-20    TPro  5.9<L>  /  Alb  3.2<L>  /  TBili  0.8  /  DBili  x   /  AST  29  /  ALT  31  /  AlkPhos  44  07-20  

## 2022-07-26 NOTE — BH CONSULTATION LIAISON PROGRESS NOTE - NSBHATTESTBILLINGAW_PSY_A_CORE
[id number] Inpatient care - high complexity - 35 minutes
Non-billable
[id number] Inpatient care - high complexity - 35 minutes
[id number] Inpatient care - high complexity - 35 minutes

## 2022-07-26 NOTE — BH CONSULTATION LIAISON PROGRESS NOTE - CURRENT MEDICATION
MEDICATIONS  (STANDING):  acetaminophen     Tablet .. 975 milliGRAM(s) Oral every 8 hours  bisacodyl Suppository 10 milliGRAM(s) Rectal daily  enoxaparin Injectable 40 milliGRAM(s) SubCutaneous every 24 hours  polyethylene glycol 3350 17 Gram(s) Oral two times a day  risperiDONE   Tablet 1 milliGRAM(s) Oral at bedtime  senna 2 Tablet(s) Oral at bedtime    MEDICATIONS  (PRN):  aluminum hydroxide/magnesium hydroxide/simethicone Suspension 30 milliLiter(s) Oral every 4 hours PRN Dyspepsia  haloperidol     Tablet 5 milliGRAM(s) Oral every 6 hours PRN agitation  melatonin 3 milliGRAM(s) Oral at bedtime PRN Insomnia  ondansetron Injectable 4 milliGRAM(s) IV Push every 8 hours PRN Nausea and/or Vomiting  oxyCODONE    IR 10 milliGRAM(s) Oral every 4 hours PRN Severe Pain (7 - 10)  oxyCODONE    IR 5 milliGRAM(s) Oral every 4 hours PRN Moderate Pain (4 - 6)  
MEDICATIONS  (STANDING):  acetaminophen     Tablet .. 975 milliGRAM(s) Oral every 8 hours  bisacodyl Suppository 10 milliGRAM(s) Rectal daily  enoxaparin Injectable 40 milliGRAM(s) SubCutaneous every 24 hours  polyethylene glycol 3350 17 Gram(s) Oral two times a day  risperiDONE   Tablet 1 milliGRAM(s) Oral at bedtime  senna 2 Tablet(s) Oral at bedtime    MEDICATIONS  (PRN):  aluminum hydroxide/magnesium hydroxide/simethicone Suspension 30 milliLiter(s) Oral every 4 hours PRN Dyspepsia  haloperidol     Tablet 5 milliGRAM(s) Oral every 6 hours PRN agitation  melatonin 3 milliGRAM(s) Oral at bedtime PRN Insomnia  ondansetron Injectable 4 milliGRAM(s) IV Push every 8 hours PRN Nausea and/or Vomiting  oxyCODONE    IR 10 milliGRAM(s) Oral every 4 hours PRN Severe Pain (7 - 10)  oxyCODONE    IR 5 milliGRAM(s) Oral every 4 hours PRN Moderate Pain (4 - 6)  
MEDICATIONS  (STANDING):  acetaminophen     Tablet .. 975 milliGRAM(s) Oral every 8 hours  enoxaparin Injectable 40 milliGRAM(s) SubCutaneous every 24 hours  polyethylene glycol 3350 17 Gram(s) Oral daily  senna 2 Tablet(s) Oral at bedtime    MEDICATIONS  (PRN):  aluminum hydroxide/magnesium hydroxide/simethicone Suspension 30 milliLiter(s) Oral every 4 hours PRN Dyspepsia  melatonin 3 milliGRAM(s) Oral at bedtime PRN Insomnia  ondansetron Injectable 4 milliGRAM(s) IV Push every 8 hours PRN Nausea and/or Vomiting  oxyCODONE    IR 10 milliGRAM(s) Oral every 4 hours PRN Severe Pain (7 - 10)  oxyCODONE    IR 5 milliGRAM(s) Oral every 4 hours PRN Moderate Pain (4 - 6)  
MEDICATIONS  (STANDING):  acetaminophen     Tablet .. 975 milliGRAM(s) Oral every 8 hours  bisacodyl Suppository 10 milliGRAM(s) Rectal daily  enoxaparin Injectable 40 milliGRAM(s) SubCutaneous every 24 hours  polyethylene glycol 3350 17 Gram(s) Oral two times a day  risperiDONE   Tablet 1 milliGRAM(s) Oral at bedtime  senna 2 Tablet(s) Oral at bedtime    MEDICATIONS  (PRN):  aluminum hydroxide/magnesium hydroxide/simethicone Suspension 30 milliLiter(s) Oral every 4 hours PRN Dyspepsia  haloperidol     Tablet 5 milliGRAM(s) Oral every 6 hours PRN agitation  ibuprofen  Tablet. 400 milliGRAM(s) Oral every 6 hours PRN Mild Pain (1 - 3), Moderate Pain (4 - 6)  melatonin 3 milliGRAM(s) Oral at bedtime PRN Insomnia  ondansetron Injectable 4 milliGRAM(s) IV Push every 8 hours PRN Nausea and/or Vomiting  oxyCODONE    IR 10 milliGRAM(s) Oral every 4 hours PRN Severe Pain (7 - 10)

## 2022-07-26 NOTE — BH CONSULTATION LIAISON PROGRESS NOTE - NSBHATTESTSTAFFAMEND_PSY_A_CORE
Previously Declined (within the last year)
I have personally seen and examined this patient. I fully participated in the care of this patient. I have made amendments to the documentation where appropriate and otherwise agree with the history, physical exam, and plan as documented by the

## 2022-07-27 LAB
ALBUMIN SERPL ELPH-MCNC: 3.4 G/DL — SIGNIFICANT CHANGE UP (ref 3.3–5)
ALP SERPL-CCNC: 154 U/L — HIGH (ref 40–120)
ALT FLD-CCNC: 18 U/L — SIGNIFICANT CHANGE UP (ref 4–41)
ANION GAP SERPL CALC-SCNC: 9 MMOL/L — SIGNIFICANT CHANGE UP (ref 7–14)
AST SERPL-CCNC: 16 U/L — SIGNIFICANT CHANGE UP (ref 4–40)
BILIRUB SERPL-MCNC: 0.8 MG/DL — SIGNIFICANT CHANGE UP (ref 0.2–1.2)
BUN SERPL-MCNC: 11 MG/DL — SIGNIFICANT CHANGE UP (ref 7–23)
CALCIUM SERPL-MCNC: 8.8 MG/DL — SIGNIFICANT CHANGE UP (ref 8.4–10.5)
CHLORIDE SERPL-SCNC: 105 MMOL/L — SIGNIFICANT CHANGE UP (ref 98–107)
CO2 SERPL-SCNC: 24 MMOL/L — SIGNIFICANT CHANGE UP (ref 22–31)
CREAT SERPL-MCNC: 0.65 MG/DL — SIGNIFICANT CHANGE UP (ref 0.5–1.3)
EGFR: 110 ML/MIN/1.73M2 — SIGNIFICANT CHANGE UP
GLUCOSE SERPL-MCNC: 94 MG/DL — SIGNIFICANT CHANGE UP (ref 70–99)
HCT VFR BLD CALC: 26.6 % — LOW (ref 39–50)
HGB BLD-MCNC: 8.4 G/DL — LOW (ref 13–17)
MAGNESIUM SERPL-MCNC: 2.1 MG/DL — SIGNIFICANT CHANGE UP (ref 1.6–2.6)
MCHC RBC-ENTMCNC: 27.2 PG — SIGNIFICANT CHANGE UP (ref 27–34)
MCHC RBC-ENTMCNC: 31.6 GM/DL — LOW (ref 32–36)
MCV RBC AUTO: 86.1 FL — SIGNIFICANT CHANGE UP (ref 80–100)
NRBC # BLD: 0 /100 WBCS — SIGNIFICANT CHANGE UP
NRBC # FLD: 0 K/UL — SIGNIFICANT CHANGE UP
PHOSPHATE SERPL-MCNC: 3.4 MG/DL — SIGNIFICANT CHANGE UP (ref 2.5–4.5)
PLATELET # BLD AUTO: 321 K/UL — SIGNIFICANT CHANGE UP (ref 150–400)
POTASSIUM SERPL-MCNC: 4.2 MMOL/L — SIGNIFICANT CHANGE UP (ref 3.5–5.3)
POTASSIUM SERPL-SCNC: 4.2 MMOL/L — SIGNIFICANT CHANGE UP (ref 3.5–5.3)
PROT SERPL-MCNC: 6.4 G/DL — SIGNIFICANT CHANGE UP (ref 6–8.3)
RBC # BLD: 3.09 M/UL — LOW (ref 4.2–5.8)
RBC # FLD: 16.6 % — HIGH (ref 10.3–14.5)
SODIUM SERPL-SCNC: 138 MMOL/L — SIGNIFICANT CHANGE UP (ref 135–145)
WBC # BLD: 5.47 K/UL — SIGNIFICANT CHANGE UP (ref 3.8–10.5)
WBC # FLD AUTO: 5.47 K/UL — SIGNIFICANT CHANGE UP (ref 3.8–10.5)

## 2022-07-27 PROCEDURE — 99232 SBSQ HOSP IP/OBS MODERATE 35: CPT

## 2022-07-27 RX ADMIN — Medication 975 MILLIGRAM(S): at 13:11

## 2022-07-27 RX ADMIN — OXYCODONE HYDROCHLORIDE 10 MILLIGRAM(S): 5 TABLET ORAL at 07:18

## 2022-07-27 RX ADMIN — OXYCODONE HYDROCHLORIDE 10 MILLIGRAM(S): 5 TABLET ORAL at 06:18

## 2022-07-27 RX ADMIN — Medication 400 MILLIGRAM(S): at 09:13

## 2022-07-27 RX ADMIN — Medication 400 MILLIGRAM(S): at 10:10

## 2022-07-27 RX ADMIN — Medication 975 MILLIGRAM(S): at 06:17

## 2022-07-27 RX ADMIN — Medication 400 MILLIGRAM(S): at 16:25

## 2022-07-27 RX ADMIN — Medication 400 MILLIGRAM(S): at 22:33

## 2022-07-27 RX ADMIN — Medication 400 MILLIGRAM(S): at 23:33

## 2022-07-27 RX ADMIN — Medication 975 MILLIGRAM(S): at 22:02

## 2022-07-27 NOTE — PROGRESS NOTE ADULT - PROBLEM SELECTOR PLAN 3
Currently stable and not a harm to self or others. Not using any medications at home.  -currently calm and re-directable, periodically expresses some ideas of paranoia but has some insight and is typically agreeable.   - Qt/Qtc 398/438 ms from EKG 7/16  - haldol 5mg prn for acute agitation  - c/w risperdal 1 mg qhs offered but as per psych, pt has capacity to refuse as he is not currently a harm to self or others

## 2022-07-27 NOTE — PROGRESS NOTE ADULT - SUBJECTIVE AND OBJECTIVE BOX
Patient Name: CORINNE FARMER  Patient MRN: [id number]    Pt is a 56y yo Male admitted for Patient is a 56y old  Male who presents with a chief complaint of hip pain (26 Jul 2022 12:24)      **SUBJECTIVE**    Last 24 hours: Yesterday, pt experienced some painless, non-pitting edema of the left ankle. Jodi's sign negative, DVT ruled out with doppler. This AM, patient states feeling well. Pain well controlled with current regimen. Pt not taking risperidone but as per psych, it is not necessary as patient is calm, comfortable, and agreeable.     Review of Systems  Constitutional: No weakness, fevers, or chills  HEENT: No headache, visual changes, lightheadedness, or sore throat  Respiratory: No shortness of breath, cough, wheezing, or hemoptysis  Cardiovascular: No chest pain or palpitations  GI: No abdominal or epigastric pain. No nausea, vomiting, or change in bowel habits. No hematemesis or hematochezia  Neuro: No numbness or weakness  Skin: No itching or rashes      **OBJECTIVE**    Physical Exam  T(C): 36.8 (07-27-22 @ 05:20), Max: 37.1 (07-26-22 @ 21:34)  HR: 66 (07-27-22 @ 05:20) (62 - 74)  BP: 123/80 (07-27-22 @ 05:20) (106/64 - 123/80)  RR: 17 (07-27-22 @ 05:20) (17 - 18)  SpO2: 100% (07-27-22 @ 05:20) (100% - 100%)    Review of Systems  Constitutional: No weakness, fevers, or chills  HEENT: No headache, visual changes, lightheadedness, or sore throat  Respiratory: No shortness of breath, cough, wheezing, or hemoptysis  Cardiovascular: No chest pain or palpitations  GI: No abdominal or epigastric pain. No nausea, vomiting, or change in bowel habits. No hematemesis or hematochezia  Neuro: No numbness or weakness  Skin: No itching or rashes    Labs  07-26    136  |  104  |  12  ----------------------------<  101<H>  3.9   |  23  |  0.60    Ca    8.7      26 Jul 2022 06:30  Phos  3.3     07-26  Mg     2.10     07-26    TPro  6.4  /  Alb  3.4  /  TBili  0.8  /  DBili  x   /  AST  17  /  ALT  21  /  AlkPhos  132<H>  07-26    CBC Full  -  ( 26 Jul 2022 06:30 )  WBC Count : 6.62 K/uL  RBC Count : 3.12 M/uL  Hemoglobin : 8.4 g/dL  Hematocrit : 27.0 %  Platelet Count - Automated : 304 K/uL  Mean Cell Volume : 86.5 fL  Mean Cell Hemoglobin : 26.9 pg  Mean Cell Hemoglobin Concentration : 31.1 gm/dL  Auto Neutrophil # : x  Auto Lymphocyte # : x  Auto Monocyte # : x  Auto Eosinophil # : x  Auto Basophil # : x  Auto Neutrophil % : x  Auto Lymphocyte % : x  Auto Monocyte % : x  Auto Eosinophil % : x  Auto Basophil % : x              POC Glucose  CAPILLARY BLOOD GLUCOSE          I&O's  I&O's Summary    26 Jul 2022 07:01  -  27 Jul 2022 07:00  --------------------------------------------------------  IN: 0 mL / OUT: 350 mL / NET: -350 mL        UA (if applicable)      Micro (if applicable)      Imaging (if applicable)    Active Medications  MEDICATIONS  (STANDING):  acetaminophen     Tablet .. 975 milliGRAM(s) Oral every 8 hours  bisacodyl Suppository 10 milliGRAM(s) Rectal daily  enoxaparin Injectable 40 milliGRAM(s) SubCutaneous every 24 hours  pantoprazole    Tablet 40 milliGRAM(s) Oral before breakfast  polyethylene glycol 3350 17 Gram(s) Oral two times a day  risperiDONE   Tablet 1 milliGRAM(s) Oral at bedtime  senna 2 Tablet(s) Oral at bedtime    MEDICATIONS  (PRN):  aluminum hydroxide/magnesium hydroxide/simethicone Suspension 30 milliLiter(s) Oral every 4 hours PRN Dyspepsia  haloperidol     Tablet 5 milliGRAM(s) Oral every 6 hours PRN agitation  ibuprofen  Tablet. 400 milliGRAM(s) Oral every 6 hours PRN Mild Pain (1 - 3), Moderate Pain (4 - 6)  melatonin 3 milliGRAM(s) Oral at bedtime PRN Insomnia  ondansetron Injectable 4 milliGRAM(s) IV Push every 8 hours PRN Nausea and/or Vomiting  oxyCODONE    IR 10 milliGRAM(s) Oral every 4 hours PRN Severe Pain (7 - 10)

## 2022-07-27 NOTE — CHART NOTE - NSCHARTNOTEFT_GEN_A_CORE
Wound surgery service line.   Reviewed Venous duplex to LLE from 7/26/22.   No evidence of deep venous thrombosis in either lower extremity.    Ok to apply ace wrap to LLE to assist with edema. Continue B/l le elevation.  VTE prophylaxis per primary team.  Continue current wound recommendations to right knee trauma wound.    Thank you.  IRIS Painting-C  Wound Surgery - NP  #[phone]

## 2022-07-27 NOTE — PROGRESS NOTE ADULT - SUBJECTIVE AND OBJECTIVE BOX
Patient is a 56y old  Male who presents with a chief complaint of hip pain (2022 07:09)         + LLE swelling  dopplers negative  refusing risperdal since , behavioral health following.   perseverates about court, states they said he should have "5 intervals of pain medication" and that his pain is worse with activity.    calm, with chronic delusions however not interfering with therapy, per behavioral health patient has right to refuse risperdal however should be continued to be offered.    Has not needed prn medications.    REVIEW OF SYSTEMS   + pain  +swelling    PAST MEDICAL & SURGICAL HISTORY  Paranoid schizophrenia    Bipolar 1 disorder    No significant past surgical history    S/P tonsillectomy         CURRENT FUNCTIONAL STATUS    Sit-Stand Transfer Training  Sit-to-Stand Transfer Training Rehab Potential: good, to achieve stated therapy goals  Sit-to-Stand Transfer Training Symptoms Noted During/After Treatment: none  Transfer Training Sit-to-Stand Transfer: stand-by assist;  verbal cues;  weight-bearing as tolerated   rolling walker  Transfer Training Stand-to-Sit Transfer: stand-by assist;  verbal cues;  weight-bearing as tolerated   rolling walker  Sit-to-Stand Transfer Training Transfer Safety Analysis: decreased step length;  stepping too close to front of assistive device;  decreased weight-shifting ability;  decreased sequencing ability;  decreased strength;  decreased flexibility;  fear;  rolling walker    Gait Training  Gait Training Rehab Potential: good, to achieve stated therapy goals  Gait Training Symptoms Noted During/After Treatment: none  Gait Trainin feet;  rolling walker;  weight-bearing as tolerated   1 person assist;  verbal cues;  stand-by assist  Gait Analysis: 2-point gait   decreased jamal;  decreased swing-to-stance ratio;  decreased stride length;  decreased step length;  decreased velocity of limb motion;  decreased hip/knee flexion;  increased time in double stance;  crouch;  decreased weight-shifting ability;  decreased strength;  decreased flexibility;  impaired postural control;  50 feet;  rolling walker  Gait Number of Times:: x 2    Therapeutic Exercise  Therapeutic Exercise Rehab Effort: good  Therapeutic Exercise Symptoms Noted During/After Treatment: none  Therapeutic Exercise Detail: pt performed long arc quads, marches ankle pumps standing therex without complaints. Encouraged Home exercise program to maintain strength and prevent DVT while in the hospital. pt performed step up and backs with right rail and rolling walker       FAMILY HISTORY    FH: deafness or hearing loss (Mother)        RECENT LABS/IMAGING  CBC Full  -  ( 2022 06:00 )  WBC Count : 5.47 K/uL  RBC Count : 3.09 M/uL  Hemoglobin : 8.4 g/dL  Hematocrit : 26.6 %  Platelet Count - Automated : 321 K/uL  Mean Cell Volume : 86.1 fL  Mean Cell Hemoglobin : 27.2 pg  Mean Cell Hemoglobin Concentration : 31.6 gm/dL  Auto Neutrophil # : x  Auto Lymphocyte # : x  Auto Monocyte # : x  Auto Eosinophil # : x  Auto Basophil # : x  Auto Neutrophil % : x  Auto Lymphocyte % : x  Auto Monocyte % : x  Auto Eosinophil % : x  Auto Basophil % : x        138  |  105  |  11  ----------------------------<  94  4.2   |  24  |  0.65    Ca    8.8      2022 06:00  Phos  3.4       Mg     2.10         TPro  6.4  /  Alb  3.4  /  TBili  0.8  /  DBili  x   /  AST  16  /  ALT  18  /  AlkPhos  154<H>          VITALS  T(C): 36.8 (22 @ 05:20), Max: 37.1 (22 @ 21:34)  HR: 66 (22 @ 05:20) (62 - 74)  BP: 123/80 (22 @ 05:20) (106/64 - 123/80)  RR: 17 (22 @ 05:20) (17 - 18)  SpO2: 100% (22 @ 05:20) (100% - 100%)  Wt(kg): --    ALLERGIES  lactose (Unknown)  No Known Drug Allergies      MEDICATIONS   acetaminophen     Tablet .. 975 milliGRAM(s) Oral every 8 hours  aluminum hydroxide/magnesium hydroxide/simethicone Suspension 30 milliLiter(s) Oral every 4 hours PRN  bisacodyl Suppository 10 milliGRAM(s) Rectal daily  enoxaparin Injectable 40 milliGRAM(s) SubCutaneous every 24 hours  haloperidol     Tablet 5 milliGRAM(s) Oral every 6 hours PRN  ibuprofen  Tablet. 400 milliGRAM(s) Oral every 6 hours PRN  melatonin 3 milliGRAM(s) Oral at bedtime PRN  ondansetron Injectable 4 milliGRAM(s) IV Push every 8 hours PRN  oxyCODONE    IR 10 milliGRAM(s) Oral every 4 hours PRN  pantoprazole    Tablet 40 milliGRAM(s) Oral before breakfast  polyethylene glycol 3350 17 Gram(s) Oral two times a day  risperiDONE   Tablet 1 milliGRAM(s) Oral at bedtime  senna 2 Tablet(s) Oral at bedtime      ----------------------------------------------------------------------------------------   PHYSICAL EXAM  Constitutional - NAD, Comfortable in chair  HEENT - NCAT, EOMI   Chest - no respiratory distress  Cardiovascular - RRR, S1S2   Abdomen -  Soft, NTND  Extremities - + dressing intact L hip, + LLE edema  + dressing R knee  Neurologic Exam -                    Cognitive - Awake, Alert     Communication - Fluent, No dysarthria     Cranial Nerves - CN 2-12 intact     Motor -  in seated position                    LEFT    UE - ShAB 5/5, EF 5/5, EE 5/5, WE 5/5,  5/5                    RIGHT UE - ShAB 5/5, EF 5/5, EE 5/5, WE 5/5,  5/5                    LEFT    LE - HF 4/5, KE 3/5, DF 5/5, PF 5/5                       RIGHT LE - HF 5/5, KE 5/5, DF 5/5, PF 5/5        Sensory - Intact to LT      Balance - WNL Static  Psychiatric - Mood stable, Affect WNL  ----------------------------------------------------------------------------------------  ASSESSMENT/PLAN   56 year old male s/p L femoral IMN  paranoia, on Risperdal but refusing  WBAT LLE, improving with bedside therapy  Pain - oxy ir prn, tylenol, bowel regimen   bowel regimen, treatment for constipation  DVT PPX - lovenox  Diet: regular  continue bedside PT and OT   Rehab -now ambulating 50' x 2 with stand by assist and RW. he has 5 stairs to enter but can stay on main level. He is interested in inpatient rehab however improving with bedside therapy and may no longer need acute inpatient rehab.  Please attempt stairs with goal of discharge home, if requires nursing care/wound care and has difficulty with stairs may benefit from subacute rehab.

## 2022-07-28 PROCEDURE — 99232 SBSQ HOSP IP/OBS MODERATE 35: CPT

## 2022-07-28 RX ADMIN — Medication 975 MILLIGRAM(S): at 05:50

## 2022-07-28 RX ADMIN — Medication 400 MILLIGRAM(S): at 05:37

## 2022-07-28 RX ADMIN — Medication 400 MILLIGRAM(S): at 22:30

## 2022-07-28 RX ADMIN — Medication 975 MILLIGRAM(S): at 21:29

## 2022-07-28 RX ADMIN — ENOXAPARIN SODIUM 40 MILLIGRAM(S): 100 INJECTION SUBCUTANEOUS at 17:15

## 2022-07-28 RX ADMIN — Medication 975 MILLIGRAM(S): at 13:16

## 2022-07-28 RX ADMIN — PANTOPRAZOLE SODIUM 40 MILLIGRAM(S): 20 TABLET, DELAYED RELEASE ORAL at 05:50

## 2022-07-28 RX ADMIN — Medication 400 MILLIGRAM(S): at 21:30

## 2022-07-28 RX ADMIN — Medication 400 MILLIGRAM(S): at 04:37

## 2022-07-28 NOTE — PROGRESS NOTE ADULT - PROBLEM SELECTOR PLAN 4
- Fluids: NA  - Electrolytes: Will replete to maintain K>4, Phos>3, and Mag>2  - Nutrition: regular  - Activity: with PT support  - DVT Prophylaxis: lovenox  - Stress Ulcer/GI Prophylaxis: NA  - Disposition: subacute rehab pending.

## 2022-07-28 NOTE — PROVIDER CONTACT NOTE (MEDICATION) - BACKGROUND
patient admitted for fracture on the left hip with pmh of bipolar 1 disorder and paranoid schizophrenia.

## 2022-07-28 NOTE — PROGRESS NOTE ADULT - SUBJECTIVE AND OBJECTIVE BOX
Aurora Mojica MD  Internal Medicine, PGY-1        Patient Name: CORINNE FARMER  Patient MRN: [id number]    Pt is a 56y yo Male admitted for Patient is a 56y old  Male who presents with a chief complaint of hip pain (27 Jul 2022 09:51)      **SUBJECTIVE**    Last 24 hours:     Review of Systems  Constitutional: No weakness, fevers, or chills  HEENT: No headache, visual changes, lightheadedness, or sore throat  Respiratory: No shortness of breath, cough, wheezing, or hemoptysis  Cardiovascular: No chest pain or palpitations  GI: No abdominal or epigastric pain. No nausea, vomiting, or change in bowel habits. No hematemesis or hematochezia  Neuro: No numbness or weakness  Skin: No itching or rashes      **OBJECTIVE**        VITALS:   T(C): 36.7 (07-28-22 @ 05:29), Max: 37.1 (07-27-22 @ 12:44)  HR: 70 (07-28-22 @ 05:29) (70 - 81)  BP: 109/70 (07-28-22 @ 05:29) (109/70 - 121/65)  RR: 18 (07-28-22 @ 05:29) (17 - 18)  SpO2: 100% (07-28-22 @ 05:29) (100% - 100%)    GENERAL: NAD, lying in bed comfortably  HEAD:  Normocephalic  EYES: Sclera clear  ENT: Moist mucous membranes  NECK: Supple, No JVD  CHEST/LUNG: Clear to auscultation bilaterally; No rales, rhonchi, wheezing, or rubs. Unlabored respirations  HEART: Regular rate and rhythm; No murmurs, rubs, or gallops  ABDOMEN: BSx4; Soft, nontender, nondistended  EXTREMITIES:  2+ Peripheral Pulses, brisk capillary refill. No clubbing, cyanosis, or edema  NERVOUS SYSTEM:  A&Ox3, no focal deficits   SKIN: No rashes or lesions    Labs  07-27    138  |  105  |  11  ----------------------------<  94  4.2   |  24  |  0.65    Ca    8.8      27 Jul 2022 06:00  Phos  3.4     07-27  Mg     2.10     07-27    TPro  6.4  /  Alb  3.4  /  TBili  0.8  /  DBili  x   /  AST  16  /  ALT  18  /  AlkPhos  154<H>  07-27    CBC Full  -  ( 27 Jul 2022 06:00 )  WBC Count : 5.47 K/uL  RBC Count : 3.09 M/uL  Hemoglobin : 8.4 g/dL  Hematocrit : 26.6 %  Platelet Count - Automated : 321 K/uL  Mean Cell Volume : 86.1 fL  Mean Cell Hemoglobin : 27.2 pg  Mean Cell Hemoglobin Concentration : 31.6 gm/dL  Auto Neutrophil # : x  Auto Lymphocyte # : x  Auto Monocyte # : x  Auto Eosinophil # : x  Auto Basophil # : x  Auto Neutrophil % : x  Auto Lymphocyte % : x  Auto Monocyte % : x  Auto Eosinophil % : x  Auto Basophil % : x              POC Glucose  CAPILLARY BLOOD GLUCOSE          I&O's  I&O's Summary    27 Jul 2022 07:01  -  28 Jul 2022 07:00  --------------------------------------------------------  IN: 1000 mL / OUT: 500 mL / NET: 500 mL        UA (if applicable)      Micro (if applicable)      Imaging (if applicable)    Active Medications  MEDICATIONS  (STANDING):  acetaminophen     Tablet .. 975 milliGRAM(s) Oral every 8 hours  bisacodyl Suppository 10 milliGRAM(s) Rectal daily  enoxaparin Injectable 40 milliGRAM(s) SubCutaneous every 24 hours  pantoprazole    Tablet 40 milliGRAM(s) Oral before breakfast  polyethylene glycol 3350 17 Gram(s) Oral two times a day  risperiDONE   Tablet 1 milliGRAM(s) Oral at bedtime  senna 2 Tablet(s) Oral at bedtime    MEDICATIONS  (PRN):  aluminum hydroxide/magnesium hydroxide/simethicone Suspension 30 milliLiter(s) Oral every 4 hours PRN Dyspepsia  haloperidol     Tablet 5 milliGRAM(s) Oral every 6 hours PRN agitation  ibuprofen  Tablet. 400 milliGRAM(s) Oral every 6 hours PRN Mild Pain (1 - 3), Moderate Pain (4 - 6)  melatonin 3 milliGRAM(s) Oral at bedtime PRN Insomnia  ondansetron Injectable 4 milliGRAM(s) IV Push every 8 hours PRN Nausea and/or Vomiting  oxyCODONE    IR 10 milliGRAM(s) Oral every 4 hours PRN Severe Pain (7 - 10)         Aurora Mojica MD  Internal Medicine, PGY-1        Patient Name: CORINNE FARMER  Patient MRN: [id number]    Pt is a 56y yo Male admitted for Patient is a 56y old  Male who presents with a chief complaint of hip pain (27 Jul 2022 09:51)      **SUBJECTIVE**    Last 24 hours: Pt doing well. C/o of continued left thigh pain that is mostly controlled by his current pain regimen. States sometimes prior to next dose pain returns, but is then relieved when given advil or tylenol.     Review of Systems  Constitutional: No weakness, fevers, or chills  HEENT: No headache, visual changes, lightheadedness, or sore throat  Respiratory: No shortness of breath, cough, wheezing, or hemoptysis  Cardiovascular: No chest pain or palpitations  GI: No abdominal or epigastric pain. No nausea, vomiting, or change in bowel habits. No hematemesis or hematochezia  Neuro: No numbness or weakness  Skin: No itching or rashes      **OBJECTIVE**        VITALS:   T(C): 36.7 (07-28-22 @ 05:29), Max: 37.1 (07-27-22 @ 12:44)  HR: 70 (07-28-22 @ 05:29) (70 - 81)  BP: 109/70 (07-28-22 @ 05:29) (109/70 - 121/65)  RR: 18 (07-28-22 @ 05:29) (17 - 18)  SpO2: 100% (07-28-22 @ 05:29) (100% - 100%)    GENERAL: NAD, lying in bed comfortably  HEAD:  Normocephalic  EYES: Sclera clear  ENT: Moist mucous membranes  NECK: Supple, No JVD  CHEST/LUNG: Clear to auscultation bilaterally; No rales, rhonchi, wheezing, or rubs. Unlabored respirations  HEART: Regular rate and rhythm; No murmurs, rubs, or gallops  ABDOMEN: BSx4; Soft, nontender, nondistended  EXTREMITIES:  2+ Peripheral Pulses, brisk capillary refill. No clubbing, cyanosis, or edema  NERVOUS SYSTEM:  A&Ox3, no focal deficits   SKIN: No rashes or lesions    Labs  07-27    138  |  105  |  11  ----------------------------<  94  4.2   |  24  |  0.65    Ca    8.8      27 Jul 2022 06:00  Phos  3.4     07-27  Mg     2.10     07-27    TPro  6.4  /  Alb  3.4  /  TBili  0.8  /  DBili  x   /  AST  16  /  ALT  18  /  AlkPhos  154<H>  07-27    CBC Full  -  ( 27 Jul 2022 06:00 )  WBC Count : 5.47 K/uL  RBC Count : 3.09 M/uL  Hemoglobin : 8.4 g/dL  Hematocrit : 26.6 %  Platelet Count - Automated : 321 K/uL  Mean Cell Volume : 86.1 fL  Mean Cell Hemoglobin : 27.2 pg  Mean Cell Hemoglobin Concentration : 31.6 gm/dL  Auto Neutrophil # : x  Auto Lymphocyte # : x  Auto Monocyte # : x  Auto Eosinophil # : x  Auto Basophil # : x  Auto Neutrophil % : x  Auto Lymphocyte % : x  Auto Monocyte % : x  Auto Eosinophil % : x  Auto Basophil % : x              POC Glucose  CAPILLARY BLOOD GLUCOSE          I&O's  I&O's Summary    27 Jul 2022 07:01  -  28 Jul 2022 07:00  --------------------------------------------------------  IN: 1000 mL / OUT: 500 mL / NET: 500 mL        UA (if applicable)      Micro (if applicable)      Imaging (if applicable)    Active Medications  MEDICATIONS  (STANDING):  acetaminophen     Tablet .. 975 milliGRAM(s) Oral every 8 hours  bisacodyl Suppository 10 milliGRAM(s) Rectal daily  enoxaparin Injectable 40 milliGRAM(s) SubCutaneous every 24 hours  pantoprazole    Tablet 40 milliGRAM(s) Oral before breakfast  polyethylene glycol 3350 17 Gram(s) Oral two times a day  risperiDONE   Tablet 1 milliGRAM(s) Oral at bedtime  senna 2 Tablet(s) Oral at bedtime    MEDICATIONS  (PRN):  aluminum hydroxide/magnesium hydroxide/simethicone Suspension 30 milliLiter(s) Oral every 4 hours PRN Dyspepsia  haloperidol     Tablet 5 milliGRAM(s) Oral every 6 hours PRN agitation  ibuprofen  Tablet. 400 milliGRAM(s) Oral every 6 hours PRN Mild Pain (1 - 3), Moderate Pain (4 - 6)  melatonin 3 milliGRAM(s) Oral at bedtime PRN Insomnia  ondansetron Injectable 4 milliGRAM(s) IV Push every 8 hours PRN Nausea and/or Vomiting  oxyCODONE    IR 10 milliGRAM(s) Oral every 4 hours PRN Severe Pain (7 - 10)

## 2022-07-29 LAB — SARS-COV-2 RNA SPEC QL NAA+PROBE: SIGNIFICANT CHANGE UP

## 2022-07-29 PROCEDURE — 99232 SBSQ HOSP IP/OBS MODERATE 35: CPT

## 2022-07-29 RX ADMIN — Medication 975 MILLIGRAM(S): at 21:26

## 2022-07-29 RX ADMIN — ENOXAPARIN SODIUM 40 MILLIGRAM(S): 100 INJECTION SUBCUTANEOUS at 17:06

## 2022-07-29 RX ADMIN — Medication 975 MILLIGRAM(S): at 05:05

## 2022-07-29 RX ADMIN — Medication 975 MILLIGRAM(S): at 15:07

## 2022-07-29 RX ADMIN — Medication 400 MILLIGRAM(S): at 05:04

## 2022-07-29 NOTE — PROGRESS NOTE ADULT - SUBJECTIVE AND OBJECTIVE BOX
Aurora Mojica MD  Internal Medicine, PGY-1        Patient Name: CORINNE FARMER  Patient MRN: [id number]    Pt is a 56y yo Male admitted for Patient is a 56y old  Male who presents with a chief complaint of hip pain (28 Jul 2022 07:30)      **SUBJECTIVE**    Last 24 hours: No acute overnight events. Pt continues to refuse risperidone with capacity to do so. Pt states generally feels well, pain well controlled on current regimen.     Review of Systems  Constitutional: No weakness, fevers, or chills  HEENT: No headache, visual changes, lightheadedness, or sore throat  Respiratory: No shortness of breath, cough, wheezing, or hemoptysis  Cardiovascular: No chest pain or palpitations  GI: No abdominal or epigastric pain. No nausea, vomiting, or change in bowel habits. No hematemesis or hematochezia  Neuro: No numbness or weakness  Skin: No itching or rashes      **OBJECTIVE**        VITALS:   T(C): 36.7 (07-29-22 @ 05:01), Max: 36.9 (07-28-22 @ 21:43)  HR: 75 (07-29-22 @ 05:01) (68 - 75)  BP: 117/69 (07-29-22 @ 05:01) (117/69 - 120/68)  RR: 17 (07-29-22 @ 05:01) (17 - 18)  SpO2: 100% (07-29-22 @ 05:01) (96% - 100%)    GENERAL: NAD, lying in bed comfortably  HEAD:  Normocephalic  EYES: Sclera clear  ENT: Moist mucous membranes  NECK: Supple, No JVD  CHEST/LUNG: Clear to auscultation bilaterally; No rales, rhonchi, wheezing, or rubs. Unlabored respirations  HEART: Regular rate and rhythm; No murmurs, rubs, or gallops  ABDOMEN: BSx4; Soft, nontender, nondistended  EXTREMITIES:  2+ Peripheral Pulses, brisk capillary refill. No clubbing, cyanosis, or edema  NERVOUS SYSTEM:  A&Ox3, no focal deficits   SKIN: No rashes or lesions    Labs                    POC Glucose  CAPILLARY BLOOD GLUCOSE          I&O's  I&O's Summary    28 Jul 2022 07:01  -  29 Jul 2022 07:00  --------------------------------------------------------  IN: 0 mL / OUT: 775 mL / NET: -775 mL        UA (if applicable)      Micro (if applicable)      Imaging (if applicable)    Active Medications  MEDICATIONS  (STANDING):  acetaminophen     Tablet .. 975 milliGRAM(s) Oral every 8 hours  bisacodyl Suppository 10 milliGRAM(s) Rectal daily  enoxaparin Injectable 40 milliGRAM(s) SubCutaneous every 24 hours  pantoprazole    Tablet 40 milliGRAM(s) Oral before breakfast  polyethylene glycol 3350 17 Gram(s) Oral two times a day  risperiDONE   Tablet 1 milliGRAM(s) Oral at bedtime  senna 2 Tablet(s) Oral at bedtime    MEDICATIONS  (PRN):  aluminum hydroxide/magnesium hydroxide/simethicone Suspension 30 milliLiter(s) Oral every 4 hours PRN Dyspepsia  haloperidol     Tablet 5 milliGRAM(s) Oral every 6 hours PRN agitation  ibuprofen  Tablet. 400 milliGRAM(s) Oral every 6 hours PRN Mild Pain (1 - 3), Moderate Pain (4 - 6)  melatonin 3 milliGRAM(s) Oral at bedtime PRN Insomnia  ondansetron Injectable 4 milliGRAM(s) IV Push every 8 hours PRN Nausea and/or Vomiting  oxyCODONE    IR 10 milliGRAM(s) Oral every 4 hours PRN Severe Pain (7 - 10)

## 2022-07-29 NOTE — PROGRESS NOTE ADULT - PROBLEM SELECTOR PROBLEM 4
DVT (deep venous thrombosis)    Femur fracture, left  sx sx 2015  Wrist fracture, left Need for prophylactic measure

## 2022-07-30 PROCEDURE — 99232 SBSQ HOSP IP/OBS MODERATE 35: CPT | Mod: GC

## 2022-07-30 RX ADMIN — Medication 400 MILLIGRAM(S): at 10:22

## 2022-07-30 RX ADMIN — Medication 400 MILLIGRAM(S): at 11:20

## 2022-07-30 RX ADMIN — Medication 975 MILLIGRAM(S): at 22:07

## 2022-07-30 RX ADMIN — Medication 975 MILLIGRAM(S): at 05:39

## 2022-07-30 RX ADMIN — Medication 975 MILLIGRAM(S): at 13:16

## 2022-07-30 RX ADMIN — ENOXAPARIN SODIUM 40 MILLIGRAM(S): 100 INJECTION SUBCUTANEOUS at 17:32

## 2022-07-30 NOTE — PROGRESS NOTE ADULT - SUBJECTIVE AND OBJECTIVE BOX
Aurora Mojica MD  Internal Medicine, PGY-1        Patient Name: CORINNE FARMER  Patient MRN: [id number]    Pt is a 56y yo Male admitted for Patient is a 56y old  Male who presents with a chief complaint of hip pain (29 Jul 2022 07:19)      **SUBJECTIVE**    Last 24 hours: No acute events overnight. Pt continues to feel well with some remaining left thigh pain that is mostly controlled with current pain regimen. Pt is pleased with his progress with PT and notices increased mobility of his left hip. Denies SOB, chest pain, fever or chills.     Review of Systems  Constitutional: No weakness, fevers, or chills  HEENT: No headache, visual changes, lightheadedness, or sore throat  Respiratory: No shortness of breath, cough, wheezing, or hemoptysis  Cardiovascular: No chest pain or palpitations  GI: No abdominal or epigastric pain. No nausea, vomiting, or change in bowel habits. No hematemesis or hematochezia  Neuro: No numbness or weakness  Skin: No itching or rashes      **OBJECTIVE**        VITALS:   T(C): 37.3 (07-30-22 @ 05:30), Max: 37.3 (07-30-22 @ 05:30)  HR: 76 (07-30-22 @ 05:30) (72 - 89)  BP: 126/61 (07-30-22 @ 05:30) (110/61 - 126/61)  RR: 18 (07-30-22 @ 05:30) (17 - 18)  SpO2: 100% (07-30-22 @ 05:30) (97% - 100%)    GENERAL: NAD, lying in bed comfortably  HEAD:  Normocephalic  EYES: Sclera clear  ENT: Moist mucous membranes  NECK: Supple, No JVD  CHEST/LUNG: Clear to auscultation bilaterally; No rales, rhonchi, wheezing, or rubs. Unlabored respirations  HEART: Regular rate and rhythm; No murmurs, rubs, or gallops  ABDOMEN: BSx4; Soft, nontender, nondistended  EXTREMITIES:  2+ Peripheral Pulses, brisk capillary refill. No clubbing, cyanosis, or edema  NERVOUS SYSTEM:  A&Ox3, no focal deficits   SKIN: No rashes or lesions    Labs                    POC Glucose  CAPILLARY BLOOD GLUCOSE          I&O's  I&O's Summary    29 Jul 2022 07:01  -  30 Jul 2022 07:00  --------------------------------------------------------  IN: 0 mL / OUT: 850 mL / NET: -850 mL        UA (if applicable)      Micro (if applicable)      Imaging (if applicable)    Active Medications  MEDICATIONS  (STANDING):  acetaminophen     Tablet .. 975 milliGRAM(s) Oral every 8 hours  bisacodyl Suppository 10 milliGRAM(s) Rectal daily  enoxaparin Injectable 40 milliGRAM(s) SubCutaneous every 24 hours  multivitamin 1 Tablet(s) Oral daily  pantoprazole    Tablet 40 milliGRAM(s) Oral before breakfast  polyethylene glycol 3350 17 Gram(s) Oral two times a day  risperiDONE   Tablet 1 milliGRAM(s) Oral at bedtime  senna 2 Tablet(s) Oral at bedtime    MEDICATIONS  (PRN):  aluminum hydroxide/magnesium hydroxide/simethicone Suspension 30 milliLiter(s) Oral every 4 hours PRN Dyspepsia  haloperidol     Tablet 5 milliGRAM(s) Oral every 6 hours PRN agitation  ibuprofen  Tablet. 400 milliGRAM(s) Oral every 6 hours PRN Mild Pain (1 - 3), Moderate Pain (4 - 6)  melatonin 3 milliGRAM(s) Oral at bedtime PRN Insomnia  ondansetron Injectable 4 milliGRAM(s) IV Push every 8 hours PRN Nausea and/or Vomiting  oxyCODONE    IR 10 milliGRAM(s) Oral every 4 hours PRN Severe Pain (7 - 10)

## 2022-07-30 NOTE — CHART NOTE - NSCHARTNOTEFT_GEN_A_CORE
Contacted by pt's mother's . States mother is absolutely terrified of her son when unmedicated and that he cannot be d/c home unless he is forcibly medicated. States when he is in the home, mother spends all her time sitting directly next to the door in case she needs to leave for her own safety.     Reassured that based on PT current recommendation, pt would not be d/c home and that any d/c is unlikely this weekend, as placement has already been difficult to find since pt does not have insurance.     Will follow up on Monday.

## 2022-07-31 PROCEDURE — 99231 SBSQ HOSP IP/OBS SF/LOW 25: CPT | Mod: GC

## 2022-07-31 RX ADMIN — ENOXAPARIN SODIUM 40 MILLIGRAM(S): 100 INJECTION SUBCUTANEOUS at 17:06

## 2022-07-31 RX ADMIN — Medication 975 MILLIGRAM(S): at 13:57

## 2022-07-31 RX ADMIN — Medication 400 MILLIGRAM(S): at 11:39

## 2022-07-31 RX ADMIN — Medication 975 MILLIGRAM(S): at 21:10

## 2022-07-31 RX ADMIN — Medication 975 MILLIGRAM(S): at 05:08

## 2022-07-31 RX ADMIN — Medication 400 MILLIGRAM(S): at 12:30

## 2022-07-31 NOTE — PROGRESS NOTE ADULT - SUBJECTIVE AND OBJECTIVE BOX
Sheron Abad PGY2  [phone]    Patient is a 56y old  Male who presents with a chief complaint of hip pain (30 Jul 2022 07:17)      SUBJECTIVE / OVERNIGHT EVENTS:  Patient seen and examined at bedside.    Other Review of Systems Negative.    MEDICATIONS  (STANDING):  acetaminophen     Tablet .. 975 milliGRAM(s) Oral every 8 hours  bisacodyl Suppository 10 milliGRAM(s) Rectal daily  enoxaparin Injectable 40 milliGRAM(s) SubCutaneous every 24 hours  multivitamin 1 Tablet(s) Oral daily  pantoprazole    Tablet 40 milliGRAM(s) Oral before breakfast  polyethylene glycol 3350 17 Gram(s) Oral two times a day  risperiDONE   Tablet 1 milliGRAM(s) Oral at bedtime  senna 2 Tablet(s) Oral at bedtime    MEDICATIONS  (PRN):  aluminum hydroxide/magnesium hydroxide/simethicone Suspension 30 milliLiter(s) Oral every 4 hours PRN Dyspepsia  haloperidol     Tablet 5 milliGRAM(s) Oral every 6 hours PRN agitation  ibuprofen  Tablet. 400 milliGRAM(s) Oral every 6 hours PRN Mild Pain (1 - 3), Moderate Pain (4 - 6)  melatonin 3 milliGRAM(s) Oral at bedtime PRN Insomnia  ondansetron Injectable 4 milliGRAM(s) IV Push every 8 hours PRN Nausea and/or Vomiting  oxyCODONE    IR 10 milliGRAM(s) Oral every 4 hours PRN Severe Pain (7 - 10)      OBJECTIVE:    Vital Signs Last 24 Hrs  T(C): 36.5 (31 Jul 2022 05:30), Max: 37.1 (30 Jul 2022 20:35)  T(F): 97.7 (31 Jul 2022 05:30), Max: 98.8 (30 Jul 2022 20:35)  HR: 64 (31 Jul 2022 05:30) (60 - 73)  BP: 107/66 (31 Jul 2022 05:30) (107/66 - 123/76)  BP(mean): --  RR: 16 (31 Jul 2022 05:30) (16 - 18)  SpO2: 100% (31 Jul 2022 05:30) (98% - 100%)    Parameters below as of 31 Jul 2022 05:30  Patient On (Oxygen Delivery Method): room air        CAPILLARY BLOOD GLUCOSE        I&O's Summary    30 Jul 2022 07:01  -  31 Jul 2022 07:00  --------------------------------------------------------  IN: 1000 mL / OUT: 1500 mL / NET: -500 mL        PHYSICAL EXAM:  GENERAL: NAD, well-developed  HEAD:  Atraumatic, Normocephalic  EYES: EOMI, PERRLA, conjunctiva and sclera clear  NECK: Supple, No JVD  CHEST/LUNG: Clear to auscultation bilaterally; No wheeze  HEART: Regular rate and rhythm; No murmurs, rubs, or gallops  ABDOMEN: Soft, Nontender, Nondistended; Bowel sounds present  EXTREMITIES:  2+ Peripheral Pulses, No clubbing, cyanosis, or edema  PSYCH: AAOx3  NEUROLOGY: non-focal  SKIN: No rashes or lesions    LABS:                      ABG:     VBG:       Care Discussed with Consultants/Other Providers:    RADIOLOGY & ADDITIONAL TESTS:  (Imaging Personally Reviewed)       Sheron Abad PGY2  [phone]    Patient is a 56y old  Male who presents with a chief complaint of hip pain (30 Jul 2022 07:17)      SUBJECTIVE / OVERNIGHT EVENTS:  Patient seen and examined at bedside.  No acute events overnight. Feels the left thigh is improving, minimal pain this morning. Eager to go to rehab. Requesting a "higher level massage machine" for massage therapy to help him heal.     Other Review of Systems Negative.    MEDICATIONS  (STANDING):  acetaminophen     Tablet .. 975 milliGRAM(s) Oral every 8 hours  bisacodyl Suppository 10 milliGRAM(s) Rectal daily  enoxaparin Injectable 40 milliGRAM(s) SubCutaneous every 24 hours  multivitamin 1 Tablet(s) Oral daily  pantoprazole    Tablet 40 milliGRAM(s) Oral before breakfast  polyethylene glycol 3350 17 Gram(s) Oral two times a day  risperiDONE   Tablet 1 milliGRAM(s) Oral at bedtime  senna 2 Tablet(s) Oral at bedtime    MEDICATIONS  (PRN):  aluminum hydroxide/magnesium hydroxide/simethicone Suspension 30 milliLiter(s) Oral every 4 hours PRN Dyspepsia  haloperidol     Tablet 5 milliGRAM(s) Oral every 6 hours PRN agitation  ibuprofen  Tablet. 400 milliGRAM(s) Oral every 6 hours PRN Mild Pain (1 - 3), Moderate Pain (4 - 6)  melatonin 3 milliGRAM(s) Oral at bedtime PRN Insomnia  ondansetron Injectable 4 milliGRAM(s) IV Push every 8 hours PRN Nausea and/or Vomiting  oxyCODONE    IR 10 milliGRAM(s) Oral every 4 hours PRN Severe Pain (7 - 10)      OBJECTIVE:    Vital Signs Last 24 Hrs  T(C): 36.5 (31 Jul 2022 05:30), Max: 37.1 (30 Jul 2022 20:35)  T(F): 97.7 (31 Jul 2022 05:30), Max: 98.8 (30 Jul 2022 20:35)  HR: 64 (31 Jul 2022 05:30) (60 - 73)  BP: 107/66 (31 Jul 2022 05:30) (107/66 - 123/76)  BP(mean): --  RR: 16 (31 Jul 2022 05:30) (16 - 18)  SpO2: 100% (31 Jul 2022 05:30) (98% - 100%)    Parameters below as of 31 Jul 2022 05:30  Patient On (Oxygen Delivery Method): room air        CAPILLARY BLOOD GLUCOSE        I&O's Summary    30 Jul 2022 07:01  -  31 Jul 2022 07:00  --------------------------------------------------------  IN: 1000 mL / OUT: 1500 mL / NET: -500 mL      PHYSICAL EXAM:  GENERAL: NAD, well-developed  HEAD:  Atraumatic, Normocephalic  EYES: EOMI, PERRLA, conjunctiva and sclera clear  NECK: Supple, No JVD  CHEST/LUNG: Clear to auscultation bilaterally; No wheeze  HEART: Regular rate and rhythm; No murmurs, rubs, or gallops  ABDOMEN: Soft, Nontender, Nondistended; Bowel sounds present  EXTREMITIES:  2+ Peripheral Pulses, No clubbing, cyanosis, or edema  PSYCH: AAOx3  NEUROLOGY: non-focal  SKIN: No rashes or lesions    Care Discussed with Consultants/Other Providers:    RADIOLOGY & ADDITIONAL TESTS:  (Imaging Personally Reviewed)

## 2022-07-31 NOTE — PROGRESS NOTE ADULT - ASSESSMENT
57 y/o M w PMH of paranoid schizophrenia (with multiple hospitalizations) presents with acute left hip fracture, s/p IMN 7/17. Psych consult for medication mgmt of schizophrenia. Medically optimized pending placement to [facility]   57 y/o M w PMH of paranoid schizophrenia (with multiple hospitalizations) presents with acute left hip fracture, s/p IMN 7/17. Psych consult for medication mgmt of schizophrenia. Medically optimized pending placement to [facility].

## 2022-08-01 PROCEDURE — 99232 SBSQ HOSP IP/OBS MODERATE 35: CPT | Mod: GC

## 2022-08-01 RX ADMIN — Medication 975 MILLIGRAM(S): at 21:46

## 2022-08-01 RX ADMIN — Medication 400 MILLIGRAM(S): at 10:05

## 2022-08-01 RX ADMIN — Medication 400 MILLIGRAM(S): at 09:31

## 2022-08-01 RX ADMIN — ENOXAPARIN SODIUM 40 MILLIGRAM(S): 100 INJECTION SUBCUTANEOUS at 18:28

## 2022-08-01 RX ADMIN — Medication 975 MILLIGRAM(S): at 13:50

## 2022-08-01 RX ADMIN — Medication 975 MILLIGRAM(S): at 05:18

## 2022-08-01 RX ADMIN — Medication 400 MILLIGRAM(S): at 22:46

## 2022-08-01 RX ADMIN — Medication 400 MILLIGRAM(S): at 21:46

## 2022-08-01 NOTE — PROGRESS NOTE ADULT - PROBLEM SELECTOR PLAN 4
- Fluids: NA  - Electrolytes: Will replete to maintain K>4, Phos>3, and Mag>2  - Nutrition: regular  - Activity: with PT support  - DVT Prophylaxis: lovenox  - Stress Ulcer/GI Prophylaxis: NA  - Disposition: subacute rehab pending. - Fluids: NA  - Electrolytes: Will replete to maintain K>4, Phos>3, and Mag>2  - Nutrition: regular  - Activity: with PT support  - DVT Prophylaxis: lovenox  - Stress Ulcer/GI Prophylaxis: NA  - Disposition: to shelter when available - cleard by PT on 7/31 for home w/ home PT

## 2022-08-01 NOTE — PROGRESS NOTE ADULT - ASSESSMENT
55 y/o M w PMH of paranoid schizophrenia (with multiple hospitalizations) presents with acute left hip fracture, s/p IMN 7/17. Psych consult for medication mgmt of schizophrenia. Medically optimized pending placement to [facility].

## 2022-08-01 NOTE — PROGRESS NOTE ADULT - SUBJECTIVE AND OBJECTIVE BOX
Andrade Proctor MD  Internal Medicine, PGY-2  Please Contact via TEAMS    SUBJECTIVE / OVERNIGHT EVENTS:  - Pt seen and examined at bedside  - ELIZABETH  - Patient denies any complaints overnight. The patient denies any fevers, chills, nausea, vomiting, or increased pain. Patient reports improvement in ability to bear weight w/ L foot and ability to use walker.     MEDICATIONS  (STANDING):  acetaminophen     Tablet .. 975 milliGRAM(s) Oral every 8 hours  bisacodyl Suppository 10 milliGRAM(s) Rectal daily  enoxaparin Injectable 40 milliGRAM(s) SubCutaneous every 24 hours  multivitamin 1 Tablet(s) Oral daily  pantoprazole    Tablet 40 milliGRAM(s) Oral before breakfast  polyethylene glycol 3350 17 Gram(s) Oral two times a day  risperiDONE   Tablet 1 milliGRAM(s) Oral at bedtime  senna 2 Tablet(s) Oral at bedtime    MEDICATIONS  (PRN):  aluminum hydroxide/magnesium hydroxide/simethicone Suspension 30 milliLiter(s) Oral every 4 hours PRN Dyspepsia  haloperidol     Tablet 5 milliGRAM(s) Oral every 6 hours PRN agitation  ibuprofen  Tablet. 400 milliGRAM(s) Oral every 6 hours PRN Mild Pain (1 - 3), Moderate Pain (4 - 6)  melatonin 3 milliGRAM(s) Oral at bedtime PRN Insomnia  ondansetron Injectable 4 milliGRAM(s) IV Push every 8 hours PRN Nausea and/or Vomiting  oxyCODONE    IR 10 milliGRAM(s) Oral every 4 hours PRN Severe Pain (7 - 10)        07-31-22 @ 07:01  -  08-01-22 @ 07:00  --------------------------------------------------------  IN: 1022 mL / OUT: 1750 mL / NET: -728 mL        PHYSICAL EXAM:  Vital Signs Last 24 Hrs  T(C): 36.9 (01 Aug 2022 05:00), Max: 36.9 (01 Aug 2022 05:00)  T(F): 98.5 (01 Aug 2022 05:00), Max: 98.5 (01 Aug 2022 05:00)  HR: 68 (01 Aug 2022 05:00) (68 - 71)  BP: 122/78 (01 Aug 2022 05:00) (109/65 - 130/76)  BP(mean): --  RR: 16 (01 Aug 2022 05:00) (16 - 18)  SpO2: 100% (01 Aug 2022 05:00) (100% - 100%)    Parameters below as of 01 Aug 2022 05:00  Patient On (Oxygen Delivery Method): room air        CAPILLARY BLOOD GLUCOSE        I&O's Summary    31 Jul 2022 07:01  -  01 Aug 2022 07:00  --------------------------------------------------------  IN: 1022 mL / OUT: 1750 mL / NET: -728 mL        CONSTITUTIONAL: NAD, well-developed  RESPIRATORY: Normal respiratory effort; lungs are clear to auscultation bilaterally  CARDIOVASCULAR: Regular rate and rhythm, normal S1 and S2, no murmur/rub/gallop; No lower extremity edema; Peripheral pulses are 2+ bilaterally  ABDOMEN: Nontender to palpation, normoactive bowel sounds, no rebound/guarding; No hepatosplenomegaly  MUSCLOSKELETAL: mild nonpitting edema on LLE  PSYCH: A+O to person, place, and time; affect appropriate    LABS:                      IMAGING:    [X] All pertinent imaging reviewed by me

## 2022-08-02 PROCEDURE — 99232 SBSQ HOSP IP/OBS MODERATE 35: CPT

## 2022-08-02 RX ADMIN — Medication 400 MILLIGRAM(S): at 21:11

## 2022-08-02 RX ADMIN — Medication 975 MILLIGRAM(S): at 13:11

## 2022-08-02 RX ADMIN — Medication 975 MILLIGRAM(S): at 05:12

## 2022-08-02 RX ADMIN — Medication 400 MILLIGRAM(S): at 22:11

## 2022-08-02 RX ADMIN — Medication 975 MILLIGRAM(S): at 21:11

## 2022-08-02 RX ADMIN — Medication 400 MILLIGRAM(S): at 05:12

## 2022-08-02 RX ADMIN — ENOXAPARIN SODIUM 40 MILLIGRAM(S): 100 INJECTION SUBCUTANEOUS at 17:45

## 2022-08-02 RX ADMIN — Medication 400 MILLIGRAM(S): at 06:12

## 2022-08-02 NOTE — CHART NOTE - NSCHARTNOTEFT_GEN_A_CORE
Nutrition follow up assessment. Per chart, 55 y/o M w PMH of paranoid schizophrenia (with multiple hospitalizations) presents with acute left hip fracture, s/p IMN 7/17. Psych consult for medication mgmt of schizophrenia. Medically optimized pending placement to [facility].  MOHAN met with patient at bedside today. Patient reports appetite has improved, with good PO intake >75% at meals but sometimes he said he did not get what he wants at dinner meal. New food preferences obtained and honored on CBoard. Endorses he likes and takes 100% of Ensure supplement. Patient tolerated diet well, denies chewing/swallowing difficulties at meals, also denies GI distress (nausea/vomiting/diarrhea/constipation) at this time. Patient has no questions about his diet during encounter today.    Diet : Diet, Regular:   Supplement Feeding Modality:  Oral  Ensure Enlive Cans or Servings Per Day:  1       Frequency:  Daily (07-25-22 @ 16:05)      Anthropometrics:  Height (cm): 185.4 (17 Jul 2022 20:20)  Weight (kg): 76.7 (17 Jul 2022 20:20)  BMI (kg/m2): 22.3 (17 Jul 2022 20:20)  BSA (m2): 2 (17 Jul 2022 20:20)    -- no new wt to reassess.    Skin: L lateral thigh and L hip with surgical incisions, R knee abrasion; No pressure injuries.    Edema: jordan knees, jordan feet, and jordan ankles 2+ per flowsheet.      Pertinent Medications: MEDICATIONS  (STANDING):  acetaminophen     Tablet .. 975 milliGRAM(s) Oral every 8 hours  bisacodyl Suppository 10 milliGRAM(s) Rectal daily  enoxaparin Injectable 40 milliGRAM(s) SubCutaneous every 24 hours  multivitamin 1 Tablet(s) Oral daily  pantoprazole    Tablet 40 milliGRAM(s) Oral before breakfast  polyethylene glycol 3350 17 Gram(s) Oral two times a day  risperiDONE   Tablet 1 milliGRAM(s) Oral at bedtime  senna 2 Tablet(s) Oral at bedtime    MEDICATIONS  (PRN):  aluminum hydroxide/magnesium hydroxide/simethicone Suspension 30 milliLiter(s) Oral every 4 hours PRN Dyspepsia  haloperidol     Tablet 5 milliGRAM(s) Oral every 6 hours PRN agitation  ibuprofen  Tablet. 400 milliGRAM(s) Oral every 6 hours PRN Mild Pain (1 - 3), Moderate Pain (4 - 6)  melatonin 3 milliGRAM(s) Oral at bedtime PRN Insomnia  ondansetron Injectable 4 milliGRAM(s) IV Push every 8 hours PRN Nausea and/or Vomiting    Pertinent Labs:   07-27 Phos 3.4 mg/dL 07-27 Alb 3.4 g/dL 07-22 Chol 122 mg/dL LDL --    HDL 46 mg/dL Trig 86 mg/dL        Estimated Needs:   [X] no change since previous assessment  [ ] recalculated:       Previous Nutrition Diagnosis:  [X]Inadequate Oral Intake     Nutrition Diagnosis is [ ] ongoing  [X] resolved [ ] not applicable        Recommendations:    1. Continue current diet order, which remains appropriate at this time.   2. Monitor weights, labs, BM's, skin integrity, PO intake/tolerance.   3. Encourage po intake as tolerated, assist with meals and menu selections, provide alternatives PRN.   4. c/w daily Multivitamin per MD order.    -- Wayne Kenney, RDN [id number]

## 2022-08-02 NOTE — PROGRESS NOTE ADULT - PROBLEM SELECTOR PLAN 4
- Fluids: NA  - Electrolytes: Will replete to maintain K>4, Phos>3, and Mag>2  - Nutrition: regular  - Activity: with PT support  - DVT Prophylaxis: lovenox  - Stress Ulcer/GI Prophylaxis: NA  - Disposition: to shelter when available - cleared by PT on 7/31 for home w/ home PT

## 2022-08-02 NOTE — CHART NOTE - NSCHARTNOTEFT_GEN_A_CORE
Called to bedside regarding question if patient can consent to his discharge plan. On evaluation, patient was calm, in no distress. Patient was explained his discharge plan to rehab, if patient can self pay given absence of insurance, however if patient cannot afford, patient will need to go to hotel or a shelter on discharge the hospital. Patient demonstrated good understanding of this discharge plan, able to repeat back the entire plan, including what he will do in the case that he cannot go to a rehab facility. Patient A&Ox3, demonstrating intact judgement.     Given that patient demonstrated good understanding of discharge plan, at this time patient can consent to the discharge plan. Patient does not need surrogate to consent for discharge to rehab vs hotel/shelter.     Discussed w/ attending Dr. Sethi.    Andrade Proctor, PGY-2  Internal Medicine  Can be reached via [employer] TEAMS

## 2022-08-02 NOTE — PROGRESS NOTE ADULT - SUBJECTIVE AND OBJECTIVE BOX
Aurora Mojica MD  Internal Medicine, PGY-1        Patient Name: CORINNE FARMER  Patient MRN: [id number]    Pt is a 56y yo Male admitted for Patient is a 56y old  Male who presents with a chief complaint of hip pain (01 Aug 2022 08:10)      **SUBJECTIVE**    Last 24 hours: No acute overnight events. This AM, pt walking around using walker. Says he continues to notice improvements in his mobility, weight bearing ability, and activity. Pain continues to be well controlled.     Review of Systems  Constitutional: No weakness, fevers, or chills  HEENT: No headache, visual changes, lightheadedness, or sore throat  Respiratory: No shortness of breath, cough, wheezing, or hemoptysis  Cardiovascular: No chest pain or palpitations  GI: No abdominal or epigastric pain. No nausea, vomiting, or change in bowel habits. No hematemesis or hematochezia  Neuro: No numbness or weakness  Skin: No itching or rashes      **OBJECTIVE**    VITALS:   T(C): 36.8 (08-02-22 @ 05:07), Max: 36.9 (08-01-22 @ 21:58)  HR: 71 (08-02-22 @ 05:07) (71 - 84)  BP: 122/65 (08-02-22 @ 05:07) (107/59 - 129/67)  RR: 18 (08-02-22 @ 05:07) (17 - 19)  SpO2: 100% (08-02-22 @ 05:07) (100% - 100%)    GENERAL: NAD, lying in bed comfortably  HEAD:  Normocephalic  EYES: Sclera clear  ENT: Moist mucous membranes  NECK: Supple, No JVD  CHEST/LUNG: Clear to auscultation bilaterally; No rales, rhonchi, wheezing, or rubs. Unlabored respirations  HEART: Regular rate and rhythm; No murmurs, rubs, or gallops  ABDOMEN: BSx4; Soft, nontender, nondistended  EXTREMITIES:  2+ Peripheral Pulses, brisk capillary refill. No clubbing, cyanosis, or edema  NERVOUS SYSTEM:  A&Ox3, no focal deficits   SKIN: No rashes or lesions    Labs      POC Glucose  CAPILLARY BLOOD GLUCOSE      I&O's  I&O's Summary    31 Jul 2022 07:01  -  01 Aug 2022 07:00  --------------------------------------------------------  IN: 1022 mL / OUT: 1750 mL / NET: -728 mL    01 Aug 2022 07:01  -  02 Aug 2022 06:57  --------------------------------------------------------  IN: 0 mL / OUT: 1200 mL / NET: -1200 mL        UA (if applicable)      Micro (if applicable)      Imaging (if applicable)    Active Medications  MEDICATIONS  (STANDING):  acetaminophen     Tablet .. 975 milliGRAM(s) Oral every 8 hours  bisacodyl Suppository 10 milliGRAM(s) Rectal daily  enoxaparin Injectable 40 milliGRAM(s) SubCutaneous every 24 hours  multivitamin 1 Tablet(s) Oral daily  pantoprazole    Tablet 40 milliGRAM(s) Oral before breakfast  polyethylene glycol 3350 17 Gram(s) Oral two times a day  risperiDONE   Tablet 1 milliGRAM(s) Oral at bedtime  senna 2 Tablet(s) Oral at bedtime    MEDICATIONS  (PRN):  aluminum hydroxide/magnesium hydroxide/simethicone Suspension 30 milliLiter(s) Oral every 4 hours PRN Dyspepsia  haloperidol     Tablet 5 milliGRAM(s) Oral every 6 hours PRN agitation  ibuprofen  Tablet. 400 milliGRAM(s) Oral every 6 hours PRN Mild Pain (1 - 3), Moderate Pain (4 - 6)  melatonin 3 milliGRAM(s) Oral at bedtime PRN Insomnia  ondansetron Injectable 4 milliGRAM(s) IV Push every 8 hours PRN Nausea and/or Vomiting

## 2022-08-02 NOTE — PROGRESS NOTE ADULT - ASSESSMENT
55 y/o M w PMH of paranoid schizophrenia (with multiple hospitalizations) presents with acute left hip fracture, s/p IMN 7/17. Psych consult for medication mgmt of schizophrenia. Medically optimized pending placement to [facility].

## 2022-08-03 ENCOUNTER — TRANSCRIPTION ENCOUNTER (OUTPATIENT)
Age: 57
End: 2022-08-03

## 2022-08-03 PROCEDURE — 99232 SBSQ HOSP IP/OBS MODERATE 35: CPT

## 2022-08-03 RX ORDER — ASPIRIN/CALCIUM CARB/MAGNESIUM 324 MG
1 TABLET ORAL
Qty: 84 | Refills: 0
Start: 2022-08-03 | End: 2022-09-13

## 2022-08-03 RX ORDER — ACETAMINOPHEN 500 MG
3 TABLET ORAL
Qty: 90 | Refills: 0
Start: 2022-08-03 | End: 2022-08-12

## 2022-08-03 RX ORDER — PANTOPRAZOLE SODIUM 20 MG/1
1 TABLET, DELAYED RELEASE ORAL
Qty: 0 | Refills: 0 | DISCHARGE
Start: 2022-08-03

## 2022-08-03 RX ADMIN — Medication 400 MILLIGRAM(S): at 06:09

## 2022-08-03 RX ADMIN — Medication 400 MILLIGRAM(S): at 05:09

## 2022-08-03 RX ADMIN — Medication 975 MILLIGRAM(S): at 05:08

## 2022-08-03 RX ADMIN — Medication 975 MILLIGRAM(S): at 13:40

## 2022-08-03 RX ADMIN — ENOXAPARIN SODIUM 40 MILLIGRAM(S): 100 INJECTION SUBCUTANEOUS at 17:21

## 2022-08-03 RX ADMIN — Medication 975 MILLIGRAM(S): at 21:46

## 2022-08-03 NOTE — PROGRESS NOTE ADULT - SUBJECTIVE AND OBJECTIVE BOX
Aurora Mojica MD  Internal Medicine, PGY-1        Patient Name: CORINNE FARMER  Patient MRN: [id number]    Pt is a 56y yo Male admitted for Patient is a 56y old  Male who presents with a chief complaint of hip pain (02 Aug 2022 06:57)      **SUBJECTIVE**    Last 24 hours: NAEON. Pt feeling well this AM. Making progress with mobility and weight bearing with PT. Pain well controlled.     Review of Systems  Constitutional: No weakness, fevers, or chills  HEENT: No headache, visual changes, lightheadedness, or sore throat  Respiratory: No shortness of breath, cough, wheezing, or hemoptysis  Cardiovascular: No chest pain or palpitations  GI: No abdominal or epigastric pain. No nausea, vomiting, or change in bowel habits. No hematemesis or hematochezia  Neuro: No numbness or weakness  Skin: No itching or rashes      **OBJECTIVE**        VITALS:   T(C): 36.6 (08-03-22 @ 05:03), Max: 37 (08-02-22 @ 12:14)  HR: 76 (08-03-22 @ 05:03) (73 - 81)  BP: 123/82 (08-03-22 @ 05:03) (111/71 - 123/82)  RR: 16 (08-03-22 @ 05:03) (16 - 19)  SpO2: 100% (08-03-22 @ 05:03) (98% - 100%)    GENERAL: NAD, lying in bed comfortably  HEAD:  Normocephalic  EYES: Sclera clear  ENT: Moist mucous membranes  NECK: Supple, No JVD  CHEST/LUNG: Clear to auscultation bilaterally; No rales, rhonchi, wheezing, or rubs. Unlabored respirations  HEART: Regular rate and rhythm; No murmurs, rubs, or gallops  ABDOMEN: BSx4; Soft, nontender, nondistended  EXTREMITIES:  2+ Peripheral Pulses, brisk capillary refill. No clubbing, cyanosis, or edema  NERVOUS SYSTEM:  A&Ox3, no focal deficits   SKIN: No rashes or lesions    Labs                    POC Glucose  CAPILLARY BLOOD GLUCOSE          I&O's  I&O's Summary    02 Aug 2022 07:01  -  03 Aug 2022 07:00  --------------------------------------------------------  IN: 472 mL / OUT: 800 mL / NET: -328 mL        UA (if applicable)      Micro (if applicable)      Imaging (if applicable)    Active Medications  MEDICATIONS  (STANDING):  acetaminophen     Tablet .. 975 milliGRAM(s) Oral every 8 hours  bisacodyl Suppository 10 milliGRAM(s) Rectal daily  enoxaparin Injectable 40 milliGRAM(s) SubCutaneous every 24 hours  multivitamin 1 Tablet(s) Oral daily  pantoprazole    Tablet 40 milliGRAM(s) Oral before breakfast  polyethylene glycol 3350 17 Gram(s) Oral two times a day  risperiDONE   Tablet 1 milliGRAM(s) Oral at bedtime  senna 2 Tablet(s) Oral at bedtime    MEDICATIONS  (PRN):  aluminum hydroxide/magnesium hydroxide/simethicone Suspension 30 milliLiter(s) Oral every 4 hours PRN Dyspepsia  haloperidol     Tablet 5 milliGRAM(s) Oral every 6 hours PRN agitation  ibuprofen  Tablet. 400 milliGRAM(s) Oral every 6 hours PRN Mild Pain (1 - 3), Moderate Pain (4 - 6)  melatonin 3 milliGRAM(s) Oral at bedtime PRN Insomnia  ondansetron Injectable 4 milliGRAM(s) IV Push every 8 hours PRN Nausea and/or Vomiting

## 2022-08-03 NOTE — PROGRESS NOTE ADULT - ASSESSMENT
55 y/o M w PMH of paranoid schizophrenia (with multiple hospitalizations) presents with acute left hip fracture, s/p IMN 7/17. Psych consult for medication mgmt of schizophrenia. Medically optimized pending placement to [facility].

## 2022-08-03 NOTE — PROGRESS NOTE ADULT - PROBLEM SELECTOR PLAN 4
- Fluids: NA  - Electrolytes: Will replete to maintain K>4, Phos>3, and Mag>2  - Nutrition: regular  - Activity: with PT support  - DVT Prophylaxis: lovenox  - Stress Ulcer/GI Prophylaxis: NA  - Disposition: to shelter when available - cleared by PT on 7/31 for home w/ home PT - Fluids: NA  - Electrolytes: Will replete to maintain K>4, Phos>3, and Mag>2  - Nutrition: regular  - Activity: with PT support  - DVT Prophylaxis: lovenox  - Stress Ulcer/GI Prophylaxis: NA  - Disposition: to shelter when available - cleared by PT on 7/31 for home w/ outpatient PT. PT script given to patient as well as prescription for walker.

## 2022-08-03 NOTE — DISCHARGE NOTE NURSING/CASE MANAGEMENT/SOCIAL WORK - NSDCPEFALRISK_GEN_ALL_CORE
espinoza cuerpo  INSTRUCCIONES SOBRE [facility]ITAL[facility]:   Llame al 911 si:  · Tiene dolor en el pecho o dificultad para respirar de forma repentina  Busque atención médica de inmediato si:  · Está mareado o siente que se va a desmayar  · Usted tiene dificultad para tragar  · Usted tiene dolor de garganta intenso  · José Miguel evacuaciones intestinales son Asif Edgardo o negras  · Espinoza abdomen está stiven espinoza costado derecho con chelsea almohada térmica sobre espinoza abdomen  Camine un poco para ayudar a expulsar el gas  Coma comidas pequeñas hasta que se alivie de la inflamación  Descanse después de espinoza procedimiento: No maneje o tome decisiones importantes hasta el día siguiente de espinoza procedimiento  Regrese a josé miguel actividades normales según le indiquen   Usted generalmente puede regresar al Aleahrosario Freemanian al día siguiente de espinoza procedimiento  Acuda a josé miguel consultas de control con espinoza médico según le indicaron: Anote josé miguel preguntas para que se acuerde de hacerlas chance josé miguel visitas  © Copyright "[employer] AmVac  or [address] [place] [address] mai cantidad pat de jovita roseann brillante en josé miguel evacuaciones intestinales  · Espinoza abdomen está stiven espinoza lado dahlia con chelsea almohada térmica sobre espinoza espinoza presión arterial y [employer]  Acuda a la consulta de control con espinoza médico según las indicaciones: Anote josé miguel preguntas para que se acuerde de hacerlas chance josé miguel visitas  © Copyright [facility] [facility] [employer] For information on Fall & Injury Prevention, visit: [email or web address] OR  [email or web address] OR  https://www.cdc.gov/steadi/patient.html

## 2022-08-03 NOTE — DISCHARGE NOTE NURSING/CASE MANAGEMENT/SOCIAL WORK - NSDCFUADDAPPT_GEN_ALL_CORE_FT
For outpatient follow up with the psychiatric team, please make an appointment at [phone] with LISA FAY

## 2022-08-03 NOTE — DISCHARGE NOTE NURSING/CASE MANAGEMENT/SOCIAL WORK - PATIENT PORTAL LINK FT
You can access the FollowMyHealth Patient Portal offered by [facility] by registering at the following website: [email or web address]. By joining [facility]’s FollowMyHealth portal, you will also be able to view your health information using other applications (apps) compatible with our system.

## 2022-08-04 PROCEDURE — 99232 SBSQ HOSP IP/OBS MODERATE 35: CPT

## 2022-08-04 RX ADMIN — Medication 975 MILLIGRAM(S): at 05:41

## 2022-08-04 RX ADMIN — Medication 975 MILLIGRAM(S): at 21:29

## 2022-08-04 RX ADMIN — Medication 400 MILLIGRAM(S): at 13:00

## 2022-08-04 RX ADMIN — Medication 975 MILLIGRAM(S): at 13:02

## 2022-08-04 RX ADMIN — Medication 400 MILLIGRAM(S): at 12:00

## 2022-08-04 NOTE — PROGRESS NOTE ADULT - ASSESSMENT
57 y/o M w PMH of paranoid schizophrenia (with multiple hospitalizations) presents with acute left hip fracture, s/p IMN 7/17. Psych consult for medication mgmt of schizophrenia. Medically optimized pending placement to [facility].

## 2022-08-04 NOTE — PROGRESS NOTE ADULT - SUBJECTIVE AND OBJECTIVE BOX
Aurora Mojica MD  Internal Medicine, PGY-1        Patient Name: CORINNE FARMER  Patient MRN: [id number]    Pt is a 56y yo Male admitted for Patient is a 56y old  Male who presents with a chief complaint of hip pain (03 Aug 2022 07:14)      **SUBJECTIVE**    Last 24 hours: Pt was supposed to be discharged yesterday but refused to leave. Giving notice of d/c today. Otherwise, NAEON and pt feeling well.    Review of Systems  Constitutional: No weakness, fevers, or chills  HEENT: No headache, visual changes, lightheadedness, or sore throat  Respiratory: No shortness of breath, cough, wheezing, or hemoptysis  Cardiovascular: No chest pain or palpitations  GI: No abdominal or epigastric pain. No nausea, vomiting, or change in bowel habits. No hematemesis or hematochezia  Neuro: No numbness or weakness  Skin: No itching or rashes      **OBJECTIVE**        VITALS:   T(C): 36.9 (08-04-22 @ 05:23), Max: 37.3 (08-03-22 @ 12:55)  HR: 78 (08-04-22 @ 05:23) (69 - 78)  BP: 118/69 (08-04-22 @ 05:23) (118/69 - 125/69)  RR: 17 (08-04-22 @ 05:23) (17 - 17)  SpO2: 100% (08-04-22 @ 05:23) (100% - 100%)    GENERAL: NAD, lying in bed comfortably  HEAD:  Normocephalic  EYES: Sclera clear  ENT: Moist mucous membranes  NECK: Supple, No JVD  CHEST/LUNG: Clear to auscultation bilaterally; No rales, rhonchi, wheezing, or rubs. Unlabored respirations  HEART: Regular rate and rhythm; No murmurs, rubs, or gallops  ABDOMEN: BSx4; Soft, nontender, nondistended  EXTREMITIES:  2+ Peripheral Pulses, brisk capillary refill. No clubbing, cyanosis, or edema  NERVOUS SYSTEM:  A&Ox3, no focal deficits   SKIN: No rashes or lesions    Labs                    POC Glucose  CAPILLARY BLOOD GLUCOSE          I&O's  I&O's Summary    03 Aug 2022 07:01  -  04 Aug 2022 07:00  --------------------------------------------------------  IN: 100 mL / OUT: 1300 mL / NET: -1200 mL        UA (if applicable)      Micro (if applicable)      Imaging (if applicable)    Active Medications  MEDICATIONS  (STANDING):  acetaminophen     Tablet .. 975 milliGRAM(s) Oral every 8 hours  bisacodyl Suppository 10 milliGRAM(s) Rectal daily  enoxaparin Injectable 40 milliGRAM(s) SubCutaneous every 24 hours  multivitamin 1 Tablet(s) Oral daily  pantoprazole    Tablet 40 milliGRAM(s) Oral before breakfast  polyethylene glycol 3350 17 Gram(s) Oral two times a day  risperiDONE   Tablet 1 milliGRAM(s) Oral at bedtime  senna 2 Tablet(s) Oral at bedtime    MEDICATIONS  (PRN):  aluminum hydroxide/magnesium hydroxide/simethicone Suspension 30 milliLiter(s) Oral every 4 hours PRN Dyspepsia  ibuprofen  Tablet. 400 milliGRAM(s) Oral every 6 hours PRN Mild Pain (1 - 3), Moderate Pain (4 - 6)  melatonin 3 milliGRAM(s) Oral at bedtime PRN Insomnia  ondansetron Injectable 4 milliGRAM(s) IV Push every 8 hours PRN Nausea and/or Vomiting

## 2022-08-04 NOTE — PROGRESS NOTE ADULT - PROBLEM SELECTOR PLAN 4
- Fluids: NA  - Electrolytes: Will replete to maintain K>4, Phos>3, and Mag>2  - Nutrition: regular  - Activity: with PT support  - DVT Prophylaxis: lovenox  - Stress Ulcer/GI Prophylaxis: NA  - Disposition: to shelter when available - cleared by PT on 7/31 for home w/ outpatient PT. PT script given to patient as well as prescription for walker.

## 2022-08-05 PROCEDURE — 99232 SBSQ HOSP IP/OBS MODERATE 35: CPT

## 2022-08-05 RX ADMIN — Medication 975 MILLIGRAM(S): at 21:55

## 2022-08-05 RX ADMIN — Medication 400 MILLIGRAM(S): at 10:17

## 2022-08-05 RX ADMIN — Medication 400 MILLIGRAM(S): at 17:10

## 2022-08-05 RX ADMIN — Medication 975 MILLIGRAM(S): at 13:20

## 2022-08-05 RX ADMIN — Medication 975 MILLIGRAM(S): at 05:12

## 2022-08-05 RX ADMIN — Medication 400 MILLIGRAM(S): at 11:00

## 2022-08-05 RX ADMIN — ENOXAPARIN SODIUM 40 MILLIGRAM(S): 100 INJECTION SUBCUTANEOUS at 16:30

## 2022-08-05 RX ADMIN — Medication 400 MILLIGRAM(S): at 16:30
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
## 2022-08-05 NOTE — PROGRESS NOTE ADULT - SUBJECTIVE AND OBJECTIVE BOX
Aurora Mojica MD  Internal Medicine, PGY-1        Patient Name: CORINNE FARMER  Patient MRN: [id number]    Pt is a 56y yo Male admitted for Patient is a 56y old  Male who presents with a chief complaint of hip pain (04 Aug 2022 07:54)      **SUBJECTIVE**    Last 24 hours:     Review of Systems  Constitutional: No weakness, fevers, or chills  HEENT: No headache, visual changes, lightheadedness, or sore throat  Respiratory: No shortness of breath, cough, wheezing, or hemoptysis  Cardiovascular: No chest pain or palpitations  GI: No abdominal or epigastric pain. No nausea, vomiting, or change in bowel habits. No hematemesis or hematochezia  Neuro: No numbness or weakness  Skin: No itching or rashes      **OBJECTIVE**        VITALS:   T(C): 36.7 (08-05-22 @ 05:31), Max: 37 (08-04-22 @ 12:24)  HR: 69 (08-05-22 @ 05:31) (69 - 84)  BP: 115/72 (08-05-22 @ 05:31) (104/61 - 132/71)  RR: 18 (08-05-22 @ 05:31) (18 - 18)  SpO2: 100% (08-05-22 @ 05:31) (100% - 100%)    GENERAL: NAD, lying in bed comfortably  HEAD:  Normocephalic  EYES: Sclera clear  ENT: Moist mucous membranes  NECK: Supple, No JVD  CHEST/LUNG: Clear to auscultation bilaterally; No rales, rhonchi, wheezing, or rubs. Unlabored respirations  HEART: Regular rate and rhythm; No murmurs, rubs, or gallops  ABDOMEN: BSx4; Soft, nontender, nondistended  EXTREMITIES:  2+ Peripheral Pulses, brisk capillary refill. No clubbing, cyanosis, or edema  NERVOUS SYSTEM:  A&Ox3, no focal deficits   SKIN: No rashes or lesions    Labs                    POC Glucose  CAPILLARY BLOOD GLUCOSE          I&O's  I&O's Summary    04 Aug 2022 07:01  -  05 Aug 2022 07:00  --------------------------------------------------------  IN: 1050 mL / OUT: 800 mL / NET: 250 mL        UA (if applicable)      Micro (if applicable)      Imaging (if applicable)    Active Medications  MEDICATIONS  (STANDING):  acetaminophen     Tablet .. 975 milliGRAM(s) Oral every 8 hours  bisacodyl Suppository 10 milliGRAM(s) Rectal daily  enoxaparin Injectable 40 milliGRAM(s) SubCutaneous every 24 hours  multivitamin 1 Tablet(s) Oral daily  pantoprazole    Tablet 40 milliGRAM(s) Oral before breakfast  polyethylene glycol 3350 17 Gram(s) Oral two times a day  risperiDONE   Tablet 1 milliGRAM(s) Oral at bedtime  senna 2 Tablet(s) Oral at bedtime    MEDICATIONS  (PRN):  aluminum hydroxide/magnesium hydroxide/simethicone Suspension 30 milliLiter(s) Oral every 4 hours PRN Dyspepsia  ibuprofen  Tablet. 400 milliGRAM(s) Oral every 6 hours PRN Mild Pain (1 - 3), Moderate Pain (4 - 6)  melatonin 3 milliGRAM(s) Oral at bedtime PRN Insomnia  ondansetron Injectable 4 milliGRAM(s) IV Push every 8 hours PRN Nausea and/or Vomiting         Aurora Mojica MD  Internal Medicine, PGY-1        Patient Name: CORINNE FARMER  Patient MRN: [id number]    Pt is a 56y yo Male admitted for Patient is a 56y old  Male who presents with a chief complaint of hip pain (04 Aug 2022 07:54)      **SUBJECTIVE**    Last 24 hours: NAEON. Pt feeling well, continuing to increase mobility. Pain well controlled.     Review of Systems  Constitutional: No weakness, fevers, or chills  HEENT: No headache, visual changes, lightheadedness, or sore throat  Respiratory: No shortness of breath, cough, wheezing, or hemoptysis  Cardiovascular: No chest pain or palpitations  GI: No abdominal or epigastric pain. No nausea, vomiting, or change in bowel habits. No hematemesis or hematochezia  Neuro: No numbness or weakness  Skin: No itching or rashes      **OBJECTIVE**        VITALS:   T(C): 36.7 (08-05-22 @ 05:31), Max: 37 (08-04-22 @ 12:24)  HR: 69 (08-05-22 @ 05:31) (69 - 84)  BP: 115/72 (08-05-22 @ 05:31) (104/61 - 132/71)  RR: 18 (08-05-22 @ 05:31) (18 - 18)  SpO2: 100% (08-05-22 @ 05:31) (100% - 100%)    GENERAL: NAD, lying in bed comfortably  HEAD:  Normocephalic  EYES: Sclera clear  ENT: Moist mucous membranes  NECK: Supple, No JVD  CHEST/LUNG: Clear to auscultation bilaterally; No rales, rhonchi, wheezing, or rubs. Unlabored respirations  HEART: Regular rate and rhythm; No murmurs, rubs, or gallops  ABDOMEN: BSx4; Soft, nontender, nondistended  EXTREMITIES:  2+ Peripheral Pulses, brisk capillary refill. No clubbing, cyanosis, or edema  NERVOUS SYSTEM:  A&Ox3, no focal deficits   SKIN: No rashes or lesions    Labs                    POC Glucose  CAPILLARY BLOOD GLUCOSE          I&O's  I&O's Summary    04 Aug 2022 07:01  -  05 Aug 2022 07:00  --------------------------------------------------------  IN: 1050 mL / OUT: 800 mL / NET: 250 mL        UA (if applicable)      Micro (if applicable)      Imaging (if applicable)    Active Medications  MEDICATIONS  (STANDING):  acetaminophen     Tablet .. 975 milliGRAM(s) Oral every 8 hours  bisacodyl Suppository 10 milliGRAM(s) Rectal daily  enoxaparin Injectable 40 milliGRAM(s) SubCutaneous every 24 hours  multivitamin 1 Tablet(s) Oral daily  pantoprazole    Tablet 40 milliGRAM(s) Oral before breakfast  polyethylene glycol 3350 17 Gram(s) Oral two times a day  risperiDONE   Tablet 1 milliGRAM(s) Oral at bedtime  senna 2 Tablet(s) Oral at bedtime    MEDICATIONS  (PRN):  aluminum hydroxide/magnesium hydroxide/simethicone Suspension 30 milliLiter(s) Oral every 4 hours PRN Dyspepsia  ibuprofen  Tablet. 400 milliGRAM(s) Oral every 6 hours PRN Mild Pain (1 - 3), Moderate Pain (4 - 6)  melatonin 3 milliGRAM(s) Oral at bedtime PRN Insomnia  ondansetron Injectable 4 milliGRAM(s) IV Push every 8 hours PRN Nausea and/or Vomiting

## 2022-08-05 NOTE — PROGRESS NOTE ADULT - ASSESSMENT
55 y/o M w PMH of paranoid schizophrenia (with multiple hospitalizations) presents with acute left hip fracture, s/p IMN 7/17. Psych consult for medication mgmt of schizophrenia. Medically optimized pending placement to [facility].

## 2022-08-05 NOTE — PROGRESS NOTE ADULT - PROBLEM SELECTOR PLAN 3
Currently stable and not a harm to self or others. Not using any medications at home.  -currently calm and re-directable, periodically expresses some ideas of paranoia but has some insight and is typically agreeable.   - Qt/Qtc 398/438 ms from EKG 7/16  - c/w risperdal 1 mg qhs offered but as per psych, pt has capacity to refuse as he is not currently a harm to self or others

## 2022-08-06 PROCEDURE — 99232 SBSQ HOSP IP/OBS MODERATE 35: CPT

## 2022-08-06 RX ADMIN — Medication 400 MILLIGRAM(S): at 11:40

## 2022-08-06 RX ADMIN — Medication 975 MILLIGRAM(S): at 13:20

## 2022-08-06 RX ADMIN — Medication 975 MILLIGRAM(S): at 21:39

## 2022-08-06 RX ADMIN — Medication 400 MILLIGRAM(S): at 12:20

## 2022-08-06 RX ADMIN — Medication 975 MILLIGRAM(S): at 05:08

## 2022-08-06 NOTE — PROGRESS NOTE ADULT - SUBJECTIVE AND OBJECTIVE BOX
Aurora Mojica MD  Internal Medicine, PGY-1        Patient Name: CORINNE FARMER  Patient MRN: [id number]    Pt is a 56y yo Male admitted for Patient is a 56y old  Male who presents with a chief complaint of hip pain (05 Aug 2022 11:50)      **SUBJECTIVE**    Last 24 hours: NAEON. Referral for shelter sent yesterday, pending d/c after the weekend. No acute symptomatic complaints.     Review of Systems  Constitutional: No weakness, fevers, or chills  HEENT: No headache, visual changes, lightheadedness, or sore throat  Respiratory: No shortness of breath, cough, wheezing, or hemoptysis  Cardiovascular: No chest pain or palpitations  GI: No abdominal or epigastric pain. No nausea, vomiting, or change in bowel habits. No hematemesis or hematochezia  Neuro: No numbness or weakness  Skin: No itching or rashes      **OBJECTIVE**        VITALS:   T(C): 36.9 (08-06-22 @ 05:24), Max: 36.9 (08-06-22 @ 05:24)  HR: 63 (08-06-22 @ 05:24) (63 - 80)  BP: 119/63 (08-06-22 @ 05:24) (114/69 - 122/77)  RR: 17 (08-06-22 @ 05:24) (17 - 17)  SpO2: 99% (08-06-22 @ 05:24) (99% - 100%)    GENERAL: NAD, lying in bed comfortably  HEAD:  Normocephalic  EYES: Sclera clear  ENT: Moist mucous membranes  NECK: Supple, No JVD  CHEST/LUNG: Clear to auscultation bilaterally; No rales, rhonchi, wheezing, or rubs. Unlabored respirations  HEART: Regular rate and rhythm; No murmurs, rubs, or gallops  ABDOMEN: BSx4; Soft, nontender, nondistended  EXTREMITIES:  2+ Peripheral Pulses, brisk capillary refill. No clubbing, cyanosis, or edema  NERVOUS SYSTEM:  A&Ox3, no focal deficits   SKIN: No rashes or lesions    Labs: none      POC Glucose  CAPILLARY BLOOD GLUCOSE      I&O's  I&O's Summary      UA (if applicable)      Micro (if applicable)      Imaging (if applicable)    Active Medications  MEDICATIONS  (STANDING):  acetaminophen     Tablet .. 975 milliGRAM(s) Oral every 8 hours  bisacodyl Suppository 10 milliGRAM(s) Rectal daily  enoxaparin Injectable 40 milliGRAM(s) SubCutaneous every 24 hours  multivitamin 1 Tablet(s) Oral daily  pantoprazole    Tablet 40 milliGRAM(s) Oral before breakfast  polyethylene glycol 3350 17 Gram(s) Oral two times a day  risperiDONE   Tablet 1 milliGRAM(s) Oral at bedtime  senna 2 Tablet(s) Oral at bedtime    MEDICATIONS  (PRN):  aluminum hydroxide/magnesium hydroxide/simethicone Suspension 30 milliLiter(s) Oral every 4 hours PRN Dyspepsia  ibuprofen  Tablet. 400 milliGRAM(s) Oral every 6 hours PRN Mild Pain (1 - 3), Moderate Pain (4 - 6)  melatonin 3 milliGRAM(s) Oral at bedtime PRN Insomnia  ondansetron Injectable 4 milliGRAM(s) IV Push every 8 hours PRN Nausea and/or Vomiting

## 2022-08-06 NOTE — PROGRESS NOTE ADULT - ASSESSMENT
57 y/o M w PMH of paranoid schizophrenia (with multiple hospitalizations) presents with acute left hip fracture, s/p IMN 7/17. Psych consult for medication mgmt of schizophrenia. Medically optimized pending placement to [facility].

## 2022-08-07 PROCEDURE — 99232 SBSQ HOSP IP/OBS MODERATE 35: CPT

## 2022-08-07 RX ADMIN — Medication 975 MILLIGRAM(S): at 21:21

## 2022-08-07 RX ADMIN — Medication 400 MILLIGRAM(S): at 17:47

## 2022-08-07 RX ADMIN — Medication 975 MILLIGRAM(S): at 14:10

## 2022-08-07 RX ADMIN — Medication 975 MILLIGRAM(S): at 05:07

## 2022-08-07 RX ADMIN — Medication 400 MILLIGRAM(S): at 18:45

## 2022-08-07 RX ADMIN — Medication 400 MILLIGRAM(S): at 10:13

## 2022-08-07 RX ADMIN — Medication 400 MILLIGRAM(S): at 11:00

## 2022-08-07 NOTE — PROVIDER CONTACT NOTE (OTHER) - REASON
Patient refusing Miralax and protonix
Patient refusing Risperdal and senna
Patient refusing Risperdal and senna
Patient refusing Miralax and protonix
Patient refusing miralax

## 2022-08-07 NOTE — PROVIDER CONTACT NOTE (OTHER) - ACTION/TREATMENT ORDERED:
Patient on 2L NC.  Lung sounds coarse. Non productive cough.  Getting scheduled nebs.  Will continue to follow.   
MD aware, no interventions needed. Nursing care continued.

## 2022-08-07 NOTE — PROGRESS NOTE ADULT - ASSESSMENT
57 y/o M w PMH of paranoid schizophrenia (with multiple hospitalizations) presents with acute left hip fracture, s/p IMN 7/17. Psych consult for medication mgmt of schizophrenia. Medically optimized pending placement to [facility].

## 2022-08-07 NOTE — PROVIDER CONTACT NOTE (OTHER) - SITUATION
Patient refusing Miralax and protonix
Patient refusing Miralax and protonix
Patient refusing Risperdal and senna
Patient refusing Risperdal and senna
Patient refused miralax
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
## 2022-08-07 NOTE — PROGRESS NOTE ADULT - SUBJECTIVE AND OBJECTIVE BOX
Aurora Mojica MD  Internal Medicine, PGY-1        Patient Name: CORINNE FARMER  Patient MRN: [id number]    Pt is a 56y yo Male admitted for Patient is a 56y old  Male who presents with a chief complaint of hip pain (06 Aug 2022 07:00)      **SUBJECTIVE**    Last 24 hours: NAEON. Pt doing well. Pending referral to [facility] for d/c.     Review of Systems  Constitutional: No weakness, fevers, or chills  HEENT: No headache, visual changes, lightheadedness, or sore throat  Respiratory: No shortness of breath, cough, wheezing, or hemoptysis  Cardiovascular: No chest pain or palpitations  GI: No abdominal or epigastric pain. No nausea, vomiting, or change in bowel habits. No hematemesis or hematochezia  Neuro: No numbness or weakness  Skin: No itching or rashes      **OBJECTIVE**        VITALS:   T(C): 37.2 (08-07-22 @ 05:04), Max: 37.2 (08-07-22 @ 05:04)  HR: 72 (08-07-22 @ 05:04) (72 - 80)  BP: 121/67 (08-07-22 @ 05:04) (113/60 - 121/67)  RR: 18 (08-07-22 @ 05:04) (18 - 18)  SpO2: 100% (08-07-22 @ 05:04) (100% - 100%)    GENERAL: NAD, lying in bed comfortably  HEAD:  Normocephalic  EYES: Sclera clear  ENT: Moist mucous membranes  NECK: Supple, No JVD  CHEST/LUNG: Clear to auscultation bilaterally; No rales, rhonchi, wheezing, or rubs. Unlabored respirations  HEART: Regular rate and rhythm; No murmurs, rubs, or gallops  ABDOMEN: BSx4; Soft, nontender, nondistended  EXTREMITIES:  2+ Peripheral Pulses, brisk capillary refill. No clubbing, cyanosis, or edema  NERVOUS SYSTEM:  A&Ox3, no focal deficits   SKIN: No rashes or lesions    Labs                    POC Glucose  CAPILLARY BLOOD GLUCOSE          I&O's  I&O's Summary      UA (if applicable)      Micro (if applicable)      Imaging (if applicable)    Active Medications  MEDICATIONS  (STANDING):  acetaminophen     Tablet .. 975 milliGRAM(s) Oral every 8 hours  bisacodyl Suppository 10 milliGRAM(s) Rectal daily  enoxaparin Injectable 40 milliGRAM(s) SubCutaneous every 24 hours  multivitamin 1 Tablet(s) Oral daily  pantoprazole    Tablet 40 milliGRAM(s) Oral before breakfast  polyethylene glycol 3350 17 Gram(s) Oral two times a day  risperiDONE   Tablet 1 milliGRAM(s) Oral at bedtime  senna 2 Tablet(s) Oral at bedtime    MEDICATIONS  (PRN):  aluminum hydroxide/magnesium hydroxide/simethicone Suspension 30 milliLiter(s) Oral every 4 hours PRN Dyspepsia  ibuprofen  Tablet. 400 milliGRAM(s) Oral every 6 hours PRN Mild Pain (1 - 3), Moderate Pain (4 - 6)  melatonin 3 milliGRAM(s) Oral at bedtime PRN Insomnia  ondansetron Injectable 4 milliGRAM(s) IV Push every 8 hours PRN Nausea and/or Vomiting

## 2022-08-08 ENCOUNTER — INPATIENT (INPATIENT)
Facility: HOSPITAL | Age: 57
LOS: 9 days | Discharge: NURSING, REHAB OR LONG-TERM CARE | End: 2022-08-18
Attending: HOSPITALIST | Admitting: HOSPITALIST

## 2022-08-08 VITALS
RESPIRATION RATE: 18 BRPM | DIASTOLIC BLOOD PRESSURE: 89 MMHG | TEMPERATURE: 98 F | SYSTOLIC BLOOD PRESSURE: 145 MMHG | OXYGEN SATURATION: 99 % | HEART RATE: 93 BPM | HEIGHT: 73 IN

## 2022-08-08 VITALS
DIASTOLIC BLOOD PRESSURE: 71 MMHG | OXYGEN SATURATION: 97 % | RESPIRATION RATE: 17 BRPM | HEART RATE: 83 BPM | TEMPERATURE: 98 F | SYSTOLIC BLOOD PRESSURE: 126 MMHG

## 2022-08-08 DIAGNOSIS — M79.669 PAIN IN UNSPECIFIED LOWER LEG: ICD-10-CM

## 2022-08-08 DIAGNOSIS — Z90.89 ACQUIRED ABSENCE OF OTHER ORGANS: Chronic | ICD-10-CM

## 2022-08-08 LAB
ALBUMIN SERPL ELPH-MCNC: 4 G/DL — SIGNIFICANT CHANGE UP (ref 3.3–5)
ALP SERPL-CCNC: 159 U/L — HIGH (ref 40–120)
ALT FLD-CCNC: 10 U/L — SIGNIFICANT CHANGE UP (ref 4–41)
ANION GAP SERPL CALC-SCNC: 13 MMOL/L — SIGNIFICANT CHANGE UP (ref 7–14)
AST SERPL-CCNC: 10 U/L — SIGNIFICANT CHANGE UP (ref 4–40)
BASOPHILS # BLD AUTO: 0.02 K/UL — SIGNIFICANT CHANGE UP (ref 0–0.2)
BASOPHILS NFR BLD AUTO: 0.4 % — SIGNIFICANT CHANGE UP (ref 0–2)
BILIRUB SERPL-MCNC: 0.7 MG/DL — SIGNIFICANT CHANGE UP (ref 0.2–1.2)
BUN SERPL-MCNC: 16 MG/DL — SIGNIFICANT CHANGE UP (ref 7–23)
CALCIUM SERPL-MCNC: 9.4 MG/DL — SIGNIFICANT CHANGE UP (ref 8.4–10.5)
CHLORIDE SERPL-SCNC: 104 MMOL/L — SIGNIFICANT CHANGE UP (ref 98–107)
CO2 SERPL-SCNC: 23 MMOL/L — SIGNIFICANT CHANGE UP (ref 22–31)
CREAT SERPL-MCNC: 0.69 MG/DL — SIGNIFICANT CHANGE UP (ref 0.5–1.3)
EGFR: 108 ML/MIN/1.73M2 — SIGNIFICANT CHANGE UP
EOSINOPHIL # BLD AUTO: 0.07 K/UL — SIGNIFICANT CHANGE UP (ref 0–0.5)
EOSINOPHIL NFR BLD AUTO: 1.5 % — SIGNIFICANT CHANGE UP (ref 0–6)
GLUCOSE SERPL-MCNC: 99 MG/DL — SIGNIFICANT CHANGE UP (ref 70–99)
HCT VFR BLD CALC: 31.9 % — LOW (ref 39–50)
HGB BLD-MCNC: 9.9 G/DL — LOW (ref 13–17)
IANC: 2.57 K/UL — SIGNIFICANT CHANGE UP (ref 1.8–7.4)
IMM GRANULOCYTES NFR BLD AUTO: 0.2 % — SIGNIFICANT CHANGE UP (ref 0–1.5)
LYMPHOCYTES # BLD AUTO: 1.36 K/UL — SIGNIFICANT CHANGE UP (ref 1–3.3)
LYMPHOCYTES # BLD AUTO: 29.6 % — SIGNIFICANT CHANGE UP (ref 13–44)
MCHC RBC-ENTMCNC: 26.4 PG — LOW (ref 27–34)
MCHC RBC-ENTMCNC: 31 GM/DL — LOW (ref 32–36)
MCV RBC AUTO: 85.1 FL — SIGNIFICANT CHANGE UP (ref 80–100)
MONOCYTES # BLD AUTO: 0.57 K/UL — SIGNIFICANT CHANGE UP (ref 0–0.9)
MONOCYTES NFR BLD AUTO: 12.4 % — SIGNIFICANT CHANGE UP (ref 2–14)
NEUTROPHILS # BLD AUTO: 2.57 K/UL — SIGNIFICANT CHANGE UP (ref 1.8–7.4)
NEUTROPHILS NFR BLD AUTO: 55.9 % — SIGNIFICANT CHANGE UP (ref 43–77)
NRBC # BLD: 0 /100 WBCS — SIGNIFICANT CHANGE UP
NRBC # FLD: 0 K/UL — SIGNIFICANT CHANGE UP
PLATELET # BLD AUTO: 253 K/UL — SIGNIFICANT CHANGE UP (ref 150–400)
POTASSIUM SERPL-MCNC: 4.1 MMOL/L — SIGNIFICANT CHANGE UP (ref 3.5–5.3)
POTASSIUM SERPL-SCNC: 4.1 MMOL/L — SIGNIFICANT CHANGE UP (ref 3.5–5.3)
PROT SERPL-MCNC: 6.9 G/DL — SIGNIFICANT CHANGE UP (ref 6–8.3)
RBC # BLD: 3.75 M/UL — LOW (ref 4.2–5.8)
RBC # FLD: 15.9 % — HIGH (ref 10.3–14.5)
SODIUM SERPL-SCNC: 140 MMOL/L — SIGNIFICANT CHANGE UP (ref 135–145)
WBC # BLD: 4.6 K/UL — SIGNIFICANT CHANGE UP (ref 3.8–10.5)
WBC # FLD AUTO: 4.6 K/UL — SIGNIFICANT CHANGE UP (ref 3.8–10.5)

## 2022-08-08 PROCEDURE — 99285 EMERGENCY DEPT VISIT HI MDM: CPT

## 2022-08-08 PROCEDURE — 99239 HOSP IP/OBS DSCHRG MGMT >30: CPT

## 2022-08-08 RX ADMIN — Medication 975 MILLIGRAM(S): at 05:06

## 2022-08-08 RX ADMIN — Medication 400 MILLIGRAM(S): at 08:53

## 2022-08-08 RX ADMIN — Medication 400 MILLIGRAM(S): at 09:45

## 2022-08-08 RX ADMIN — Medication 975 MILLIGRAM(S): at 13:35
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
## 2022-08-08 NOTE — PROVIDER CONTACT NOTE (OTHER) - SITUATION
alerted by triage nurse of pt who was d/c earlier today and returned to ER. review of chart - noted pt was case referred  for shelter placement but refused shelter placement stated he alerted by triage nurse of pt who was d/c earlier today and returned to ER. review of chart - noted pt was  referred  on discharge for shelter placement but refused shelter placement stated "he

## 2022-08-08 NOTE — PROGRESS NOTE ADULT - PROBLEM SELECTOR PROBLEM 1
Fracture of left hip

## 2022-08-08 NOTE — PROGRESS NOTE ADULT - REASON FOR ADMISSION
hip pain

## 2022-08-08 NOTE — PROGRESS NOTE ADULT - PROBLEM SELECTOR PLAN 4
- Fluids: NA  - Electrolytes: Will replete to maintain K>4, Phos>3, and Mag>2  - Nutrition: regular  - Activity: with PT support  - DVT Prophylaxis: lovenox  - Stress Ulcer/GI Prophylaxis: NA  - Disposition: to Hotel when available - cleared by PT on 7/31 for home w/ outpatient PT. PT script given to patient as well as prescription for walker.

## 2022-08-08 NOTE — PROVIDER CONTACT NOTE (OTHER) - BACKGROUND
wanted to go to a hotel. ? Met with pt at bedside he stated he went to "[facility] to review his case" he stated he returned to hospital because he feels needs 3 more weeks of treatment but Physical wanted to go to a hotel" CM Met with pt at bedside he stated he went to "[facility] to review his case" he stated he returned to hospital because he feels needs 3 more weeks of treatment but Physical

## 2022-08-08 NOTE — ED ADULT TRIAGE NOTE - CHIEF COMPLAINT QUOTE
Patient had a fracture of left femur surgery three weeks ago and was discharged today. Pt states he wants to be reevaluated because he feels that he is not ready for discharge. He was discharged and said he did not feel like he should go home but was cleared for readiness.

## 2022-08-08 NOTE — PROGRESS NOTE ADULT - SUBJECTIVE AND OBJECTIVE BOX
PROGRESS NOTE:       Patient is a 56y old  Male who presents with a chief complaint of hip pain (07 Aug 2022 06:46)      SUBJECTIVE / OVERNIGHT EVENTS:    ADDITIONAL REVIEW OF SYSTEMS:    MEDICATIONS  (STANDING):  acetaminophen     Tablet .. 975 milliGRAM(s) Oral every 8 hours  bisacodyl Suppository 10 milliGRAM(s) Rectal daily  enoxaparin Injectable 40 milliGRAM(s) SubCutaneous every 24 hours  multivitamin 1 Tablet(s) Oral daily  pantoprazole    Tablet 40 milliGRAM(s) Oral before breakfast  polyethylene glycol 3350 17 Gram(s) Oral two times a day  risperiDONE   Tablet 1 milliGRAM(s) Oral at bedtime  senna 2 Tablet(s) Oral at bedtime    MEDICATIONS  (PRN):  aluminum hydroxide/magnesium hydroxide/simethicone Suspension 30 milliLiter(s) Oral every 4 hours PRN Dyspepsia  ibuprofen  Tablet. 400 milliGRAM(s) Oral every 6 hours PRN Mild Pain (1 - 3), Moderate Pain (4 - 6)  melatonin 3 milliGRAM(s) Oral at bedtime PRN Insomnia  ondansetron Injectable 4 milliGRAM(s) IV Push every 8 hours PRN Nausea and/or Vomiting      CAPILLARY BLOOD GLUCOSE        I&O's Summary      PHYSICAL EXAM:  Vital Signs Last 24 Hrs  T(C): 37.2 (08 Aug 2022 05:07), Max: 37.2 (08 Aug 2022 05:07)  T(F): 98.9 (08 Aug 2022 05:07), Max: 98.9 (08 Aug 2022 05:07)  HR: 66 (08 Aug 2022 05:07) (66 - 70)  BP: 124/62 (08 Aug 2022 05:07) (116/67 - 124/62)  BP(mean): --  RR: 16 (08 Aug 2022 05:07) (16 - 17)  SpO2: 100% (08 Aug 2022 05:07) (100% - 100%)    Parameters below as of 08 Aug 2022 05:07  Patient On (Oxygen Delivery Method): room air        GENERAL: No acute distress, well-developed  HEAD:  Atraumatic, Normocephalic  EYES: EOMI, PERRLA, conjunctiva and sclera clear  NECK: Supple, no lymphadenopathy, no JVD  CHEST/LUNG: CTAB; No wheezes, rales, or rhonchi  HEART: Regular rate and rhythm; No murmurs, rubs, or gallops  ABDOMEN: Soft, non-tender, non-distended; normal bowel sounds, no organomegaly  EXTREMITIES:  2+ peripheral pulses b/l, No clubbing, cyanosis, or edema  NEUROLOGY: A&O x 3, no focal deficits  SKIN: No rashes or lesions    LABS:                      RADIOLOGY & ADDITIONAL TESTS:  Results Reviewed:   Imaging Personally Reviewed:  Electrocardiogram Personally Reviewed:    COORDINATION OF CARE:  Care Discussed with Consultants/Other Providers [Y/N]:  Prior or Outpatient Records Reviewed [Y/N]:   PROGRESS NOTE:       Patient is a 56y old  Male who presents with a chief complaint of hip pain (07 Aug 2022 06:46)      SUBJECTIVE / OVERNIGHT EVENTS: No overnight events. Pain improving with PT and meds. Sleeping eating and voiding well.    ADDITIONAL REVIEW OF SYSTEMS: Denies HA/fever/chills/CP/N/V/const/diarrhea/hematuria/hematochezia    MEDICATIONS  (STANDING):  acetaminophen     Tablet .. 975 milliGRAM(s) Oral every 8 hours  bisacodyl Suppository 10 milliGRAM(s) Rectal daily  enoxaparin Injectable 40 milliGRAM(s) SubCutaneous every 24 hours  multivitamin 1 Tablet(s) Oral daily  pantoprazole    Tablet 40 milliGRAM(s) Oral before breakfast  polyethylene glycol 3350 17 Gram(s) Oral two times a day  risperiDONE   Tablet 1 milliGRAM(s) Oral at bedtime  senna 2 Tablet(s) Oral at bedtime    MEDICATIONS  (PRN):  aluminum hydroxide/magnesium hydroxide/simethicone Suspension 30 milliLiter(s) Oral every 4 hours PRN Dyspepsia  ibuprofen  Tablet. 400 milliGRAM(s) Oral every 6 hours PRN Mild Pain (1 - 3), Moderate Pain (4 - 6)  melatonin 3 milliGRAM(s) Oral at bedtime PRN Insomnia  ondansetron Injectable 4 milliGRAM(s) IV Push every 8 hours PRN Nausea and/or Vomiting      CAPILLARY BLOOD GLUCOSE        I&O's Summary      PHYSICAL EXAM:  Vital Signs Last 24 Hrs  T(C): 37.2 (08 Aug 2022 05:07), Max: 37.2 (08 Aug 2022 05:07)  T(F): 98.9 (08 Aug 2022 05:07), Max: 98.9 (08 Aug 2022 05:07)  HR: 66 (08 Aug 2022 05:07) (66 - 70)  BP: 124/62 (08 Aug 2022 05:07) (116/67 - 124/62)  BP(mean): --  RR: 16 (08 Aug 2022 05:07) (16 - 17)  SpO2: 100% (08 Aug 2022 05:07) (100% - 100%)    Parameters below as of 08 Aug 2022 05:07  Patient On (Oxygen Delivery Method): room air        GENERAL: No acute distress, well-developed  HEAD:  Atraumatic, Normocephalic  EYES: EOM grosssly Intact, conjunctiva and sclera clear  CHEST/LUNG: CTAB; No wheezes, rales, or rhonchi  HEART: Regular rate and rhythm; No murmurs, rubs, or gallops  ABDOMEN: Soft, non-tender, non-distended; normal bowel sounds, no organomegaly  EXTREMITIES: No clubbing, cyanosis, or edema  NEUROLOGY: A&O x 3, no focal deficits  SKIN: No rashes or lesions    LABS:                      RADIOLOGY & ADDITIONAL TESTS:  Results Reviewed:   Imaging Personally Reviewed:  Electrocardiogram Personally Reviewed:    COORDINATION OF CARE:  Care Discussed with Consultants/Other Providers [Y/N]:  Prior or Outpatient Records Reviewed [Y/N]:

## 2022-08-08 NOTE — ED ADULT NURSE NOTE - OBJECTIVE STATEMENT
patient received to intake 3 A&O x 4 and ambulatory with a walker. PPHx : Bipolar, Paranoid schizo. Presents to the ED hours post discharge. Pt stated "I feel like I wasn't ready to be discharged so I came back". Denies Pain, SOB, CP, N/V/D. 20G to RA. Labs collected and sent. Stretcher in lowest position, wheels locked, appropriate side rails in place, call bell in reach.

## 2022-08-08 NOTE — ED PROVIDER NOTE - CLINICAL SUMMARY MEDICAL DECISION MAKING FREE TEXT BOX
will admit for rehab and social work to see  high risk given no ideal environment to live in currently  stating trouble walking

## 2022-08-08 NOTE — PROGRESS NOTE ADULT - ASSESSMENT
55 y/o M w PMH of paranoid schizophrenia (with multiple hospitalizations) presents with acute left hip fracture, s/p IMN 7/17. Psych consult for medication mgmt of schizophrenia. Medically optimized pending placement to [facility].       Problem/Plan - 1:  ·  Problem: Fracture of left hip.   ·  Plan: -pt p/w acute left hip fracture. Initially pt refusing surgery; then agreeable and spoke with mother. IMN on 7/17  - c/w pain control  - c/w bowel regimen  - incentive spirometer at bedside and patient endorses using regularly  - dvt ppx - lovenox  - R knee scrape evaluated by wound care and currently dressed; wound care recs appreciated and followed   - PT, OT and PMNR suggesting home as of 7/31  - will send with aspirin 325 BID for 4-6 weeks on dc  - OP follow up w Dr. Stafford (orthopedics) in office 1-2 weeks after discharge; RIP emailed for followup appt.     Problem/Plan - 2:  ·  Problem: Anemia.   ·  Plan: Patient with Hgb --- this AM in the setting of recent L IMN 7/17 and hgb 10.3 on presentation.   likely 2/2 post operative hypovolemic status 2/2 anesthesia agents. Low suspicion for acute bleed at this time as patient has no symptoms   - CBC this AM 7.9; no indication to transfuse at this time. patient remains asymptomatic  - iron studies wnl, B12 and folate wnl; would recommend screening colonoscopy on dc regardless of iron study results  - leg exam: negative for pain, poikilothermia, paresthesia, pulselessness and pallor. at this time, low concern for acute compartment symptom of the leg  - monitor  - transfuse if Hgb < 7; consent in chart.     Problem/Plan - 3:  ·  Problem: Paranoid schizophrenia.   ·  Plan: Currently stable and not a harm to self or others. Not using any medications at home.  -currently calm and re-directable, periodically expresses some ideas of paranoia but has some insight and is typically agreeable.   - Qt/Qtc 398/438 ms from EKG 7/16  - c/w risperdal 1 mg qhs offered but as per psych, pt has capacity to refuse as he is not currently a harm to self or others.     Problem/Plan - 4:  ·  Problem: Need for prophylactic measure.   ·  Plan: - Fluids: NA  - Electrolytes: Will replete to maintain K>4, Phos>3, and Mag>2  - Nutrition: regular  - Activity: with PT support  - DVT Prophylaxis: lovenox  - Stress Ulcer/GI Prophylaxis: NA  - Disposition: to shelter when available - cleared by PT on 7/31 for home w/ outpatient PT. PT script given to patient as well as prescription for walker. 57 y/o M w PMH of paranoid schizophrenia (with multiple hospitalizations) presents with acute left hip fracture, s/p IMN 7/17. Psych consult for medication mgmt of schizophrenia. Medically optimized pending placement to [facility].

## 2022-08-08 NOTE — PROGRESS NOTE ADULT - PROBLEM SELECTOR PLAN 1
-pt p/w acute left hip fracture. Initially pt refusing surgery; then agreeable and spoke with mother. IMN on 7/17  - c/w pain control  - c/w bowel regimen  - incentive spirometer at bedside and patient endorses using regularly  - dvt ppx - lovenox  - R knee scrape evaluated by wound care and currently dressed; wound care recs appreciated and followed   - PT, OT and PMNR suggesting home as of 7/31  - will send with aspirin 325 BID for 4-6 weeks on dc  - OP follow up w Dr. Stafford (orthopedics) in office 1-2 weeks after discharge; RIP emailed for followup appt.

## 2022-08-08 NOTE — PROGRESS NOTE ADULT - PROVIDER SPECIALTY LIST ADULT
Internal Medicine
Orthopedics
Rehab Medicine
Rehab Medicine
Anesthesia
Hospitalist
Internal Medicine
Internal Medicine
Orthopedics
Rehab Medicine
Rehab Medicine
Internal Medicine
Wound Care
Internal Medicine

## 2022-08-09 DIAGNOSIS — Z29.9 ENCOUNTER FOR PROPHYLACTIC MEASURES, UNSPECIFIED: ICD-10-CM

## 2022-08-09 DIAGNOSIS — Z98.890 OTHER SPECIFIED POSTPROCEDURAL STATES: Chronic | ICD-10-CM

## 2022-08-09 DIAGNOSIS — F20.0 PARANOID SCHIZOPHRENIA: ICD-10-CM

## 2022-08-09 DIAGNOSIS — M79.89 OTHER SPECIFIED SOFT TISSUE DISORDERS: ICD-10-CM

## 2022-08-09 DIAGNOSIS — S72.002A FRACTURE OF UNSPECIFIED PART OF NECK OF LEFT FEMUR, INITIAL ENCOUNTER FOR CLOSED FRACTURE: ICD-10-CM

## 2022-08-09 DIAGNOSIS — D64.9 ANEMIA, UNSPECIFIED: ICD-10-CM

## 2022-08-09 LAB
ALBUMIN SERPL ELPH-MCNC: 4 G/DL — SIGNIFICANT CHANGE UP (ref 3.3–5)
ALP SERPL-CCNC: 163 U/L — HIGH (ref 40–120)
ALT FLD-CCNC: 9 U/L — SIGNIFICANT CHANGE UP (ref 4–41)
ANION GAP SERPL CALC-SCNC: 8 MMOL/L — SIGNIFICANT CHANGE UP (ref 7–14)
AST SERPL-CCNC: 11 U/L — SIGNIFICANT CHANGE UP (ref 4–40)
BASOPHILS # BLD AUTO: 0.03 K/UL — SIGNIFICANT CHANGE UP (ref 0–0.2)
BASOPHILS NFR BLD AUTO: 0.8 % — SIGNIFICANT CHANGE UP (ref 0–2)
BILIRUB SERPL-MCNC: 0.8 MG/DL — SIGNIFICANT CHANGE UP (ref 0.2–1.2)
BUN SERPL-MCNC: 14 MG/DL — SIGNIFICANT CHANGE UP (ref 7–23)
CALCIUM SERPL-MCNC: 9.4 MG/DL — SIGNIFICANT CHANGE UP (ref 8.4–10.5)
CHLORIDE SERPL-SCNC: 105 MMOL/L — SIGNIFICANT CHANGE UP (ref 98–107)
CO2 SERPL-SCNC: 25 MMOL/L — SIGNIFICANT CHANGE UP (ref 22–31)
CREAT SERPL-MCNC: 0.69 MG/DL — SIGNIFICANT CHANGE UP (ref 0.5–1.3)
EGFR: 108 ML/MIN/1.73M2 — SIGNIFICANT CHANGE UP
EOSINOPHIL # BLD AUTO: 0.06 K/UL — SIGNIFICANT CHANGE UP (ref 0–0.5)
EOSINOPHIL NFR BLD AUTO: 1.6 % — SIGNIFICANT CHANGE UP (ref 0–6)
FLUAV AG NPH QL: SIGNIFICANT CHANGE UP
FLUBV AG NPH QL: SIGNIFICANT CHANGE UP
GLUCOSE SERPL-MCNC: 91 MG/DL — SIGNIFICANT CHANGE UP (ref 70–99)
HCT VFR BLD CALC: 33.8 % — LOW (ref 39–50)
HGB BLD-MCNC: 10.2 G/DL — LOW (ref 13–17)
IANC: 2.04 K/UL — SIGNIFICANT CHANGE UP (ref 1.8–7.4)
IMM GRANULOCYTES NFR BLD AUTO: 0.3 % — SIGNIFICANT CHANGE UP (ref 0–1.5)
LYMPHOCYTES # BLD AUTO: 1.08 K/UL — SIGNIFICANT CHANGE UP (ref 1–3.3)
LYMPHOCYTES # BLD AUTO: 29.7 % — SIGNIFICANT CHANGE UP (ref 13–44)
MAGNESIUM SERPL-MCNC: 2 MG/DL — SIGNIFICANT CHANGE UP (ref 1.6–2.6)
MCHC RBC-ENTMCNC: 26.2 PG — LOW (ref 27–34)
MCHC RBC-ENTMCNC: 30.2 GM/DL — LOW (ref 32–36)
MCV RBC AUTO: 86.7 FL — SIGNIFICANT CHANGE UP (ref 80–100)
MONOCYTES # BLD AUTO: 0.42 K/UL — SIGNIFICANT CHANGE UP (ref 0–0.9)
MONOCYTES NFR BLD AUTO: 11.5 % — SIGNIFICANT CHANGE UP (ref 2–14)
NEUTROPHILS # BLD AUTO: 2.04 K/UL — SIGNIFICANT CHANGE UP (ref 1.8–7.4)
NEUTROPHILS NFR BLD AUTO: 56.1 % — SIGNIFICANT CHANGE UP (ref 43–77)
NRBC # BLD: 0 /100 WBCS — SIGNIFICANT CHANGE UP
NRBC # FLD: 0 K/UL — SIGNIFICANT CHANGE UP
PHOSPHATE SERPL-MCNC: 3.9 MG/DL — SIGNIFICANT CHANGE UP (ref 2.5–4.5)
PLATELET # BLD AUTO: 256 K/UL — SIGNIFICANT CHANGE UP (ref 150–400)
POTASSIUM SERPL-MCNC: 4 MMOL/L — SIGNIFICANT CHANGE UP (ref 3.5–5.3)
POTASSIUM SERPL-SCNC: 4 MMOL/L — SIGNIFICANT CHANGE UP (ref 3.5–5.3)
PROT SERPL-MCNC: 6.7 G/DL — SIGNIFICANT CHANGE UP (ref 6–8.3)
RBC # BLD: 3.9 M/UL — LOW (ref 4.2–5.8)
RBC # FLD: 15.9 % — HIGH (ref 10.3–14.5)
RSV RNA NPH QL NAA+NON-PROBE: SIGNIFICANT CHANGE UP
SARS-COV-2 RNA SPEC QL NAA+PROBE: SIGNIFICANT CHANGE UP
SODIUM SERPL-SCNC: 138 MMOL/L — SIGNIFICANT CHANGE UP (ref 135–145)
WBC # BLD: 3.64 K/UL — LOW (ref 3.8–10.5)
WBC # FLD AUTO: 3.64 K/UL — LOW (ref 3.8–10.5)

## 2022-08-09 PROCEDURE — 12345: CPT | Mod: NC

## 2022-08-09 PROCEDURE — 99223 1ST HOSP IP/OBS HIGH 75: CPT

## 2022-08-09 PROCEDURE — 73552 X-RAY EXAM OF FEMUR 2/>: CPT | Mod: 26,LT

## 2022-08-09 PROCEDURE — 73502 X-RAY EXAM HIP UNI 2-3 VIEWS: CPT | Mod: 26,LT

## 2022-08-09 RX ORDER — IBUPROFEN 200 MG
400 TABLET ORAL EVERY 6 HOURS
Refills: 0 | Status: DISCONTINUED | OUTPATIENT
Start: 2022-08-09 | End: 2022-08-18

## 2022-08-09 RX ORDER — ASCORBIC ACID 60 MG
500 TABLET,CHEWABLE ORAL DAILY
Refills: 0 | Status: DISCONTINUED | OUTPATIENT
Start: 2022-08-09 | End: 2022-08-18

## 2022-08-09 RX ORDER — FERROUS SULFATE 325(65) MG
325 TABLET ORAL DAILY
Refills: 0 | Status: DISCONTINUED | OUTPATIENT
Start: 2022-08-09 | End: 2022-08-18

## 2022-08-09 RX ORDER — RISPERIDONE 4 MG/1
1 TABLET ORAL AT BEDTIME
Refills: 0 | Status: DISCONTINUED | OUTPATIENT
Start: 2022-08-09 | End: 2022-08-18

## 2022-08-09 RX ORDER — ENOXAPARIN SODIUM 100 MG/ML
40 INJECTION SUBCUTANEOUS EVERY 24 HOURS
Refills: 0 | Status: DISCONTINUED | OUTPATIENT
Start: 2022-08-09 | End: 2022-08-18

## 2022-08-09 RX ORDER — ACETAMINOPHEN 500 MG
650 TABLET ORAL EVERY 8 HOURS
Refills: 0 | Status: DISCONTINUED | OUTPATIENT
Start: 2022-08-09 | End: 2022-08-18

## 2022-08-09 RX ORDER — ACETAMINOPHEN 500 MG
650 TABLET ORAL EVERY 6 HOURS
Refills: 0 | Status: DISCONTINUED | OUTPATIENT
Start: 2022-08-09 | End: 2022-08-18

## 2022-08-09 RX ADMIN — Medication 650 MILLIGRAM(S): at 20:30

## 2022-08-09 RX ADMIN — Medication 650 MILLIGRAM(S): at 19:38

## 2022-08-09 RX ADMIN — Medication 650 MILLIGRAM(S): at 22:07

## 2022-08-09 RX ADMIN — ENOXAPARIN SODIUM 40 MILLIGRAM(S): 100 INJECTION SUBCUTANEOUS at 22:11

## 2022-08-09 RX ADMIN — RISPERIDONE 1 MILLIGRAM(S): 4 TABLET ORAL at 23:32

## 2022-08-09 NOTE — BH CONSULTATION LIAISON ASSESSMENT NOTE - NSBHCHARTREVIEWVS_PSY_A_CORE FT
Vital Signs Last 24 Hrs  T(C): 37.2 (09 Aug 2022 10:08), Max: 37.2 (09 Aug 2022 10:08)  T(F): 99 (09 Aug 2022 10:08), Max: 99 (09 Aug 2022 10:08)  HR: 75 (09 Aug 2022 10:08) (75 - 93)  BP: 115/65 (09 Aug 2022 10:08) (115/65 - 145/89)  BP(mean): --  RR: 17 (09 Aug 2022 10:08) (17 - 18)  SpO2: 100% (09 Aug 2022 10:08) (97% - 100%)    Parameters below as of 09 Aug 2022 10:08  Patient On (Oxygen Delivery Method): room air

## 2022-08-09 NOTE — PROVIDER CONTACT NOTE (OTHER) - BACKGROUND
SW continues to collaborate with CM regarding case. Pt feels that he requires additional hospital stay due to pain.

## 2022-08-09 NOTE — BH CONSULTATION LIAISON ASSESSMENT NOTE - NSBHMSEGROOM_PSY_A_CORE
Assessment done per SGNA guidelines. Pt alert and oriented, no complaints of pain, respirations equal and breathing non-labored, skin warm and dry, abdomen soft and non tender.      Fair

## 2022-08-09 NOTE — PROGRESS NOTE ADULT - PROBLEM SELECTOR PLAN 1
Pt s/p left IMN 7/17/22 w/ ortho and d/[place] 8/8/22 to hotel. Per documentation pt was cleared by PT for home. Pt however returns to ED requesting help with rehab or hotel placement.  - c/w pain control w/ Tylenol and NSAIDs prn  - hip x-ray unremarkable/unchanged  - seems to be ambulating w/o difficulty

## 2022-08-09 NOTE — BH CONSULTATION LIAISON ASSESSMENT NOTE - CURRENT MEDICATION
MEDICATIONS  (STANDING):  enoxaparin Injectable 40 milliGRAM(s) SubCutaneous every 24 hours    MEDICATIONS  (PRN):  acetaminophen     Tablet .. 650 milliGRAM(s) Oral every 6 hours PRN Temp greater or equal to 38C (100.4F), Mild Pain (1 - 3)

## 2022-08-09 NOTE — PROVIDER CONTACT NOTE (OTHER) - ASSESSMENT
Pt was recently discharged with plan for pt to go to a hotel as he was refusing shelter placement. Pt presents back to hospital with pain requesting additional hospital stay. Pt with no known skilled nursing or PT needs at this time. SW met with pt at bedside to discuss. Pt unable to engage in meaningful conversation as he presents with disorganized thoughts and fixation on "court case". CM discussed with provider who will consult psychiatry for evaluation. SW will continue to follow for appropriate DC planning. Pt was recently discharged with plan for pt to go to a hotel as he was refusing shelter placement. Pt presents back to hospital with pain requesting additional hospital stay. Pt with no known skilled nursing or PT needs at this time. SW met with pt at bedside to discuss. Pt unable to engage in meaningful conversation as he presents as confused with disorganized thoughts and fixation on "court case". SW offered shelter placement and pt continues to decline due to "court case". Pt unable to elaborate on specifics of court case when SW discussed he will be able to attend court even from a shelter. CM discussed with provider who will consult psychiatry for evaluation. SW will continue to follow for appropriate DC planning.

## 2022-08-09 NOTE — BH CONSULTATION LIAISON ASSESSMENT NOTE - NSBHCONSULTMEDPRN_PSY_A_CORE
Jonas Lal is a 75 y.o. female who was seen by synchronous (real-time) audio-video technology on 5/8/2020. Consent: Jonas Lal, who was seen by synchronous (real-time) audio-video technology, and/or her healthcare decision maker, is aware that this patient-initiated, Telehealth encounter on 5/8/2020 is a billable service, with coverage as determined by her insurance carrier. She is aware that she may receive a bill and has provided verbal consent to proceed: Yes. Assessment & Plan:   Diagnoses and all orders for this visit:    1. Hypoxemia requiring supplemental oxygen    2. SLE (systemic lupus erythematosus related syndrome) ([facility] [address]    3. S/P lumbar laminectomy    4. Morbid obesity with BMI of 45.0-49.9, adult ([facility] [address]    5. Pedal edema    will need to reassess need for supplemental O2 with oxymetry during ambulation and HS. In the meantime, pt has been instructed to continue O2 use. Will also schedule echocardiogram to assess R and L heart function abnormalities as cause of pedal edema. Will call pt with results. Pt has been instructed that this is not emergent and can wait until restrictions for the pandemic have relaxed. Continue rest of medications as listed  Healthy weight discussion. RTC 3 months      I spent at least 40 minutes on this visit with this established patient. ([id number])    Subjective:   Jonas Lal is a 75 y.o. female who was seen for Hospital Follow Up    Pt was seen initially during a hospital admission for L4/5 laminectomy/discectomy and medial facetectomy. Pt was noted to be hypoxemic and was discharge on supplemental O2 to be used HS and with activity. Pt denies any dyspnea, no chest pain or fever. She does complain of persistent bipedal edema, worse over the last 2 months, and barely responsive to diuresis with Lasix. Pt's back pain and gait problems have improved dramatically since surgery.     Other PMH includes Lupus, maintained on Plaquenil. Prior to Admission medications    Medication Sig Start Date End Date Taking? Authorizing Provider   furosemide (LASIX) 20 mg tablet Take  by mouth daily. Yes Provider, Historical   gabapentin (Neurontin) 300 mg capsule Take 1 cap by mouth in the morning and 1-2 at night as directed. 4/15/20  Yes Gena Gomez NP   sertraline (Zoloft) 50 mg tablet Take  by mouth daily. Yes Provider, Historical   ibuprofen (MOTRIN) 200 mg tablet Take 800 mg by mouth every eight (8) hours as needed for Pain. Yes Provider, Historical   OXYGEN-AIR DELIVERY SYSTEMS 2 L by IntraNASal route continuous. Yes Provider, Historical   hydroxychloroquine (PLAQUENIL) 200 mg tablet Take 200 mg by mouth daily. Yes Provider, Historical   benazepril-hydrochlorothiazide (LOTENSIN HCT) 20-25 mg per tablet Take  by mouth daily. Yes Provider, Historical   acetaminophen (TYLENOL) 500 mg tablet Take 1,000 mg by mouth every six (6) hours as needed for Pain. Provider, Historical   predniSONE (DELTASONE) 5 mg tablet Take 1 Tab by mouth daily. Patient not taking: Reported on 3/2/2020 4/22/19   Kelly Chairez MD   diazePAM (VALIUM) 5 mg tablet Take 1 tablet 30 minutes before diagnostic test - may repeat x 1  Indications: inducing of a relaxed easy state 4/22/19   Kelly Chairez MD   oxyCODONE-acetaminophen (PERCOCET) 5-325 mg per tablet Take 1 tablet by mouth every four (4) hours as needed for Pain for up to 20 doses. Patient not taking: Reported on 3/2/2020 11/16/14   Guanaco Salvador MD   nortriptyline (PAMELOR) 25 mg capsule Take 1 Cap by mouth two (2) times a day.   Patient not taking: Reported on 3/2/2020 9/14/12   Rodrick Rousseau MD     Allergies   Allergen Reactions    Morphine Anxiety       Past Medical History:   Diagnosis Date    Arthritis     Bronchitis 01/01/2020    Hypertension     Indigestion     Lupus ([facility] [address]     Memory difficulty     Nausea & vomiting     Ringing of ears     sometimes    Thromboembolus [facility]) 2009     Past Surgical History:   Procedure Laterality Date    HX CHOLECYSTECTOMY  1988    HX COLONOSCOPY      HX ORTHOPAEDIC  April 09'    right knee replacement     HX ORTHOPAEDIC  Sept 09'    left knee replacement    HX VASCULAR ACCESS      IVC filter    REMOVAL GALLBLADDER       Family History   Problem Relation Age of Onset    Stroke Father     Cancer Father         prastate      Social History     Tobacco Use    Smoking status: Never Smoker    Smokeless tobacco: Never Used    Tobacco comment: heavy exposure to second hand smoke growing up   Substance Use Topics    Alcohol use: No       Review of Systems   All other systems reviewed and are negative. Objective:   Vital Signs: (As obtained by patient/caregiver at home)  There were no vitals taken for this visit.      [INSTRUCTIONS:  \"[x]\" Indicates a positive item  \"[]\" Indicates a negative item  -- DELETE ALL ITEMS NOT EXAMINED]    Constitutional: [x] Appears well-developed and well-nourished [x] No apparent distress      [x] Abnormal - Obese    Mental status: [x] Alert and awake  [x] Oriented to person/place/time [x] Able to follow commands    [] Abnormal -     Eyes:   EOM    [x]  Normal    [] Abnormal -   Sclera  [x]  Normal    [] Abnormal -          Discharge [x]  None visible   [] Abnormal -     HENT: [x] Normocephalic, atraumatic  [] Abnormal -   [x] Mouth/Throat: Mucous membranes are moist    External Ears [x] Normal  [] Abnormal -    Neck: [x] No visualized mass [] Abnormal -     Pulmonary/Chest: [x] Respiratory effort normal   [x] No visualized signs of difficulty breathing or respiratory distress        [] Abnormal -      Musculoskeletal:   [x] Normal gait with no signs of ataxia         [x] Normal range of motion of neck        [] Abnormal -     Neurological:        [x] No Facial Asymmetry (Cranial nerve 7 motor function) (limited exam due to video visit)          [x] No gaze palsy        [] Abnormal - Skin:        [x] No significant exanthematous lesions or discoloration noted on facial skin         [] Abnormal -            Psychiatric:       [x] Normal Affect [] Abnormal -        [x] No Hallucinations    Other pertinent observable physical exam findings:-        We discussed the expected course, resolution and complications of the diagnosis(es) in detail. Medication risks, benefits, costs, interactions, and alternatives were discussed as indicated. I advised her to contact the office if her condition worsens, changes or fails to improve as anticipated. She expressed understanding with the diagnosis(es) and plan. Dagoberto Terrazas is a 75 y.o. female who was evaluated by a video visit encounter for concerns as above. Patient identification was verified prior to start of the visit. A caregiver was present when appropriate. Due to this being a TeleHealth encounter (During NSLXK-11 public health emergency), evaluation of the following organ systems was limited: Vitals/Constitutional/EENT/Resp/CV/GI//MS/Neuro/Skin/Heme-Lymph-Imm. Pursuant to the emergency declaration under the [facility], [facility] waiver authority and the [employer] Act, this Virtual  Visit was conducted, with patient's (and/or legal guardian's) consent, to reduce the patient's risk of exposure to COVID-19 and provide necessary medical care. Services were provided through a video synchronous discussion virtually to substitute for in-person clinic visit. Patient and provider were located at their individual homes.       Cornelia Trejo MD yes

## 2022-08-09 NOTE — BH CONSULTATION LIAISON ASSESSMENT NOTE - NSBHATTESTTYPEVISIT_PSY_A_CORE
JERMAN without on-site Attending supervision Attending evaluating patient with JERMAN ([id number] code)

## 2022-08-09 NOTE — PHYSICAL THERAPY INITIAL EVALUATION ADULT - ADDITIONAL COMMENTS
Pt is in the process of obtaining living placement. Prior to admission, pt ambulated with a cane or rolling walker independently.

## 2022-08-09 NOTE — PATIENT PROFILE ADULT - FALL HARM RISK - HARM RISK INTERVENTIONS

## 2022-08-09 NOTE — H&P ADULT - ASSESSMENT
Pt is a 57 y/o M w/ hx of schizophrenia and recent admission for left hip fx s/p IMN, discharged 8/8/22 presenting to ED for rehab placement.   Pt is a 57 y/o M w/ hx of schizophrenia and recent admission for left hip fx s/p IMN, discharged 8/8/22 presenting to ED for left hip pain and need for placement.

## 2022-08-09 NOTE — PATIENT PROFILE ADULT - NSPROHMSYMPCOND_GEN_A_NUR
CC:   Chief Complaint   Patient presents with    Fever    Cough    Vomiting       HPI: Shelli Oliveira (: 2016) is a 9 y.o. female, established patient, here for evaluation of the following chief complaint(s): cough fever vomiting    ASSESSMENT/PLAN:    ICD-10-CM ICD-9-CM    1. Influenza A  J10.1 487.1 oseltamivir (TAMIFLU) 6 mg/mL suspension   2. Influenza B  J10.1 487.1 oseltamivir (TAMIFLU) 6 mg/mL suspension   3. Fever in pediatric patient  R50.9 780.60 AMB POC SARS-COV-2      NOVEL CORONAVIRUS (COVID-19)      AMB POC RAPID STREP A      AMB POC ANA ROSA INFLUENZA A/B TEST   4. Cough  R05.9 786.2 AMB POC SARS-COV-2      NOVEL CORONAVIRUS (COVID-19)      AMB POC RAPID STREP A      AMB POC ANA ROSA INFLUENZA A/B TEST   5. Vomiting, unspecified vomiting type, unspecified whether nausea present  R11.10 787.03      //3///  Discussed the differential diagnosis and management plan with Elda's parent. RST neg, poc covid neg but Flu Ag A and B +  COVID PCR test were sent. Child well appearing with no evidence of MISC or [place]. No evidence of secondary bacterial infection. Went over proper medication use and side effects  Vaccinated against covid 19  Reviewed symptomatic treatment with Tylenol or Motrin, supportive measures and worrisome symptoms to observe for. Discussed current recommendations for isolation and return to /school if COVID testing comes back positive. Muen'ts questions and concerns were addressed and parent expressed understanding   of what signs/symptoms for which parent should call the office or return for visit or go to an ER. Handouts were provided with the After Visit Summary. [facility] for the symptoms described in the history of present illness.  She was evaluated in the context of the global COVID-19 pandemic, which necessitated consideration that the patient might be at risk for infection with the SARS-CoV-2 virus that causes COVID-19. Institutional protocols and algorithms that pertain to the evaluation of patients at risk for COVID-19 are in a state of rapid change based on information released by regulatory bodies including the CDC and federal and state organizations. These policies and algorithms were followed during the patient's care. Have tested for covid today based on symptoms. Would recommend that patient quarantine and try to keep distance even from close family as best as possible including making at home  Will f/u with results in 2-3 days      Asymptomatic patients should be tested if they have been in close contact with an infected person. Advised to quarantine at home and stay  from household members as much as possible while test result is pending. If with positive testing, will need to isolate for 10 days from date of positive test and quarantine close contacts. If with negative test, quarantine for 14 days from last exposure or isolate for 10 days from symptom onset. SUBJECTIVE/OBJECTIVE:  Here for evaluation of fever vomiting cough congestion since yesterday  More tired  Vaccinated against covid  No other sick contacts at home  Vomiting this morning NB NB   No rashes  No shortness of breath or wheezing  No prior hx of asthma  No allergies  Drinking well just not eating much    ROS:   No oral lesions, ear pain/drainage, conjunctival injection or icterus, throat pain wheezing, shortness of breath, abdominal pain or distention, bowel or bladder problems,  muscle or joint aches,  joint swelling, rashes, petechiae, bruising or other lesions. Rest of 12 point ROS is otherwise negative      Past Medical History:   Diagnosis Date    IUGR (intrauterine growth restriction) 2015    [place] screening tests negative      History reviewed. No pertinent surgical history.   Social History     Socioeconomic History    Marital status: SINGLE Tobacco Use    Smoking status: Never Smoker    Smokeless tobacco: Never Used   Substance and Sexual Activity    Alcohol use: Never    Drug use: Never    Sexual activity: Never     Family History   Problem Relation Age of Onset    Anemia Mother         Copied from mother's history at birth         OBJECTIVE:   Visit Vitals  /74   Pulse 100   Temp 97.9 °F (36.6 °C)   Ht (!) 3' 9.16\" (1.147 m)   Wt 41 lb 4 oz (18.7 kg)   SpO2 99%   BMI 14.22 kg/m²     Vitals reviewed  GENERAL: WDWN female  in NAD. Tired but non toxic appearing. Appears well hydrated, cap refill < 3sec  EYES: PERRLA, EOMI, no conjunctival injection or icterus. No periorbital edema/erythema   EARS: Normal external ear canals with normal TMs b/l. NOSE: nasal congestion   MOUTH: OP clear,  No pharyngeal erythema or exudates  NECK: supple, no masses, no cervical lymphadenopathy. RESP: clear to auscultation bilaterally, no w/r/r  CV: RRR, normal L8/T6, no murmurs, clicks, or rubs. ABD: soft, nontender, no masses, no hepatosplenomegaly  MS:  FROM all joints  SKIN: no rashes or lesions  NEURO: non-focal      Results for orders placed or performed in visit on 06/01/22   AMB POC SARS-COV-2   Result Value Ref Range    SARS-COV-2 POC Negative Negative   AMB POC RAPID STREP A   Result Value Ref Range    VALID INTERNAL CONTROL POC Yes     Group A Strep Ag Negative Negative   AMB POC ANA ROSA INFLUENZA A/B TEST   Result Value Ref Range    VALID INTERNAL CONTROL POC Yes     Influenza A Ag POC Positive (A) Negative    Influenza B Ag POC Positive (A) Negative           An electronic signature was used to authenticate this note.   -- Tomas Urrutia DO none

## 2022-08-09 NOTE — H&P ADULT - NSHPLABSRESULTS_GEN_ALL_CORE
.  LABS:                         9.9    4.60  )-----------( 253      ( 08 Aug 2022 23:00 )             31.9     08-08    140  |  104  |  16  ----------------------------<  99  4.1   |  23  |  0.69    Ca    9.4      08 Aug 2022 23:00    TPro  6.9  /  Alb  4.0  /  TBili  0.7  /  DBili  x   /  AST  10  /  ALT  10  /  AlkPhos  159<H>  08-08                  RADIOLOGY, EKG & ADDITIONAL TESTS: Reviewed.

## 2022-08-09 NOTE — PHYSICAL THERAPY INITIAL EVALUATION ADULT - GENERAL OBSERVATIONS, REHAB EVAL
Pt received semisupine in bed, in NAD. Pt agreeable to participate in PT evaluation. Pt left semisupine in bed, all lines intact and RN aware.

## 2022-08-09 NOTE — PROGRESS NOTE ADULT - PROBLEM SELECTOR PLAN 2
LLE swelling > RLE. Likely in setting of recent surgery  - b/l LE U/S negative for DVT 7/26  - states swelling has improved LLE swelling > RLE. Likely in setting of recent surgery, noted last admission   - b/l LE U/S negative for DVT 7/26  - states swelling has improved

## 2022-08-09 NOTE — PROGRESS NOTE ADULT - PROBLEM SELECTOR PLAN 5
Lovenox ppx   regular diet  PT rec OP PT  dispo planning, need CM/SW re: safe dispo as readmitted same day as dc Lovenox ppx, asa 325 BID on dc  regular diet  PT rec OP PT  dispo planning, need CM/SW re: safe dispo as readmitted same day as dc

## 2022-08-09 NOTE — H&P ADULT - NSHPPHYSICALEXAM_GEN_ALL_CORE
VITAL SIGNS:  T(C): 36.7 (08-08-22 @ 19:17), Max: 37.2 (08-08-22 @ 05:07)  T(F): 98 (08-08-22 @ 19:17), Max: 98.9 (08-08-22 @ 05:07)  HR: 93 (08-08-22 @ 19:17) (66 - 93)  BP: 145/89 (08-08-22 @ 19:17) (124/62 - 145/89)  BP(mean): --  RR: 18 (08-08-22 @ 19:17) (16 - 18)  SpO2: 99% (08-08-22 @ 19:17) (97% - 100%)  Wt(kg): --    PHYSICAL EXAM:    Constitutional: WDWN resting comfortably in bed; NAD  Head: NC/AT  Eyes: PERRL, EOMI, anicteric sclera  ENT: no nasal discharge; uvula midline, no oropharyngeal erythema or exudates; MMM  Neck: supple; no JVD or thyromegaly  Respiratory: CTA B/L; no W/R/R, no retractions  Cardiac: +S1/S2; RRR; no M/R/G  Gastrointestinal: abdomen soft, NT/ND; no rebound or guarding; +BSx4  Back: spine midline, no bony tenderness or step-offs  Extremities: WWP, no clubbing or cyanosis; no peripheral edema  Musculoskeletal: NROM x4; no joint swelling, tenderness or erythema  Vascular: 2+ radial, DP/PT pulses B/L  Dermatologic: skin warm, dry and intact; no rashes, wounds, or scars  Lymphatic: no submandibular or cervical LAD  Neurologic: AAOx3; CNII-XII grossly intact; no focal deficits  Psychiatric: affect and characteristics of appearance, verbalizations, behaviors are appropriate VITAL SIGNS:  T(C): 36.7 (08-08-22 @ 19:17), Max: 37.2 (08-08-22 @ 05:07)  T(F): 98 (08-08-22 @ 19:17), Max: 98.9 (08-08-22 @ 05:07)  HR: 93 (08-08-22 @ 19:17) (66 - 93)  BP: 145/89 (08-08-22 @ 19:17) (124/62 - 145/89)  BP(mean): --  RR: 18 (08-08-22 @ 19:17) (16 - 18)  SpO2: 99% (08-08-22 @ 19:17) (97% - 100%)  Wt(kg): --    PHYSICAL EXAM:    Constitutional: WDWN resting comfortably in bed; NAD  Head: NC/AT  Eyes: PERRL, EOMI, anicteric sclera  ENT: no nasal discharge; uvula midline, no oropharyngeal erythema or exudates; MMM  Neck: supple; no JVD or thyromegaly  Respiratory: CTA B/L; no W/R/R, no retractions  Cardiac: +S1/S2; RRR; no M/R/G  Gastrointestinal: abdomen soft, NT/ND; no rebound or guarding; +BSx4  Back: spine midline, no bony tenderness or step-offs  Extremities: WWP, no clubbing or cyanosis; 1+ nonpitting edema of LLE >RLE  Musculoskeletal: NROM x3 except LLE. Decreased ROM of left hip, with pain.   Vascular: distal pulses intact  Dermatologic: skin warm, dry and intact; no rashes, wounds, or scars  Lymphatic: no submandibular or cervical LAD  Neurologic: AAOx3; CNII-XII grossly intact; no focal deficits  Psychiatric: affect and characteristics of appearance, verbalizations, behaviors are appropriate
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
## 2022-08-09 NOTE — PROGRESS NOTE ADULT - PROBLEM SELECTOR PLAN 4
Seems disorganized and tangential, may have stopped meds last admit 2/2 refusal Seems disorganized and tangential, stopped meds last admit 2/2 refusal  - psych f/u as unclear if able to care for self given delusions

## 2022-08-09 NOTE — H&P ADULT - PROBLEM SELECTOR PLAN 1
Pt s/p left IMN 7/17/22 w/ ortho and d/sona 8/8/22 to [facility]. Per documentation pt was cleared by PT for home. Pt however returns to ED requesting rehab placement. Pt s/p left IMN 7/17/22 w/ ortho and d/sona 8/8/22 to hotel. Per documentation pt was cleared by PT for home. Pt however returns to ED requesting help with rehab or hotel placement.  - f/u hip XR  - pain control w/ tylenol

## 2022-08-09 NOTE — BH CONSULTATION LIAISON ASSESSMENT NOTE - RISK ASSESSMENT
Risk factors: Chronic delusional disorder, noncompliance w/ suggested medications, lack of psychiatric follow-up, residential instability, poor insight  Protective factors: No suicidal Hx, no substance abuse or criminal hx, has reported stable financial income

## 2022-08-09 NOTE — PATIENT PROFILE ADULT - DO YOU FEEL THREATENED BY OTHERS?
"11/17/2020       RE: Rosario Rios  [address]  [place] [facility] [address]     Dear Colleague,    Thank you for referring your patient, Rosario Rios, to the [facility] NEUROLOGY CLINIC [place] [facility]. Please see a copy of my visit note below.        VIDEO VISIT - doximity    Date of Visit: November 17, 2020  Name: Rosario Riso  Date of Birth 1962  [address]   [place] [facility] [address] [phone] (H)  [email or web address]  [email or web address]  [id number]  [phone] mobile  Android phone     REACHED HER VIA DOXIMITY    Assessment:  (G20) Parkinson disease (H)  (primary encounter diagnosis)  Carbidopa/levodopa Sinemet 25/100 3/day @ 7am, 1pm and 8pm  Pramipexole mirapex 0.125mg 4 tabs 3/day - increased November 11    She has tremor at 6pm in her left hand  Eats at 5  Or 530pm    Right side drooling    Legs constantly move - \"feels like my bones are ready to explode\" pain on side of bilateral thighs.   Ferrous sulfate ferosul 325 65 fe - started November 11    Pain is tolerable now  - it is a 4/10  Tramadol ultram 50mg 3/day     Meralgia paresthetica of right side    [facility] - hip and back shots for bursitis. Has followup on the 19th of November.     [facility]    Smoker - smoking less  Nicoderm patch - not  Using     Possible liver disease - no recent liver function other alk phosp which was normal  Ondansetron zofran rare use  Polyethylene glycol miralax - as needed  Senokot-S 2 tabs every morning  Bowel movements have changed color with oral iron    S/p R MCA trifurcation aneurysm clipping    Long standing depression/anxiety  Some alcohol consumption 2 beers every day     [place] Psychiatrist Alison Trujillo out of [facility] -skype her from [facility]  [address], [place], [address]     Counsellor Minda out " [facility]; [facility]  [address], [place] [address]    Quetiapine seroquel 25m-1-5     Medications     6/7am 12/1p 7/8pm  830/9pm     Carbidopa/levodopa Sinemet 25/100 1 1 1       Diazepam valium 2mg  Not using           Ferrous sulfate ferosul 325 65 Fe 1       Lidocaine xylocain 5% external ointment  not helping           Lidocaine 5% cream             Lidocaine-menthol 4-5% patch  not using           Magnesium chloride 535 Not taking       Melatonin 5mg Not taking    Not taking       Mirtazapine remeron 45mg stopped     1     Nicotine nicoderm patch Not using       Ondansetron zofran 4mg ODT Rare use           Polyethylene glycol miralax As needed           Pramipexole 0.125mg mirapex 4 4 4       Prune juice   At night     Quetiapine seroquel 25mg 1 1 5       Quetiapine seroquel 50mg Not using       Salicylic acid 40% pads Not using            Senna-docusate senokot@ 8.6-50 2          Tramadol ultram 50mg tab  3/day           Trazodone desyrel 50mg  Not taking    Not taking                                                                           Plan:    For her Parkinson since she appears to have wearing off at 6pm would increase her dose of sinemet at mid day so her schedule is: 1 tab @ 7am, 1.5 tabs @1pm and 1 tab @8pm    Will forego botox for drooling (siallorhea) for now.     Return to see Xuan Ding, Pharmacist in 2021 @1pm    Psychiatrist Alison Trujillo return appointment 2020 @140pm  She had questions about medical marijuana/pain    Pain followup with Dr. Rush of [facility]  from her shots    Due to her leg symptoms it would be reasonable to have her cbc, ferritin, transferrin, iron saturation and liver function checked. Since she just started on iron she may want to get this checked sooner than later    She has ongoing sleep issues and has failed melatonin, sinequan and is presently using 125mg of seroquel at night. She is using tylenol PM as  "needed.     Treating insomnia can be difficult because the medications we use can be harmful, especially in older adults. In addition, sleep medications do not tend to be very effective and should only be used short-term. Cognitive behavioral therapy (CBT) is the most effective treatment for long-term insomnia. The goal of CBT for insomnia is to address the underlying causes of the sleep problems, including your habits and how you think about sleep. You can do CBT with a trained psychologist, or from the comfort of your home by following an online program or workbook. Here are some great resources for at-home CBT:    1) 6-week online CBT course through [facility] for $40: [employer]/sleep  2) \"Say Duy to Insomnia\" by Dre Leger, PhD at [employer]: 6-week program  3) \"Overcoming Insomnia: A Cognitive-Behavioral Therapy Approach Workbook\" by Ag Woodson and Beth Granados  4) \"Quiet Your Mind and Get to Sleep: Solutions to Insomnia for Those with Depression, Anxiety or Chronic Pain ([place] Self-Help Workbook) by Beth Granados and Enid Brar      Medications     6/7am 12/1p 7/8pm  830/9pm     Carbidopa/levodopa Sinemet 25/100 1 1.5 1       Diphenhydramine-acetaminophen tylenol PM     As needed    Doxepin sinequan 10mg/ml  Not taking   Had shaking    Ferrous sulfate ferosul 325 65 Fe 1       Lidocaine 5% cream             Melatonin 5mg Not taking    Not taking       Ondansetron zofran 4mg ODT Rare use           Polyethylene glycol miralax As needed           Pramipexole 0.125mg mirapex 4 4 4       Prune juice   At night     Quetiapine seroquel 25mg 1 1 5       Senna-docusate senokot@ 8.6-50 2          Tramadol ultram 50mg tab  3/day                                                                               I have reviewed the note as documented above.  This accurately captures the substance of my conversation with the patient.  Patient contact time " " 40  minutes. Over 50% of this visit was spent in patient care and care coordination.     Andres Squires MD      ------------------------------------------------------------------------------------------------------------------------------------------------------------------------    Video-Visit Details    The patient has been notified of following:     \"After a review of the patient s situation, this visit was changed from an in-person visit to a video visit to reduce the risk of COVID-19 exposure.   The patient is being evaluated via a billable video visit.\"    \"This video visit will be conducted via a call between you and your physician/provider. We have found that certain health care needs can be provided without the need for an in-person physical exam.  This service lets us provide the care you need with a video conversation.  If a prescription is necessary we can send it directly to your pharmacy.  If lab work is needed we can place an order for that and you can then stop by our lab to have the test done at a later time.    If during the course of the call the physician/provider feels a video visit is not appropriate, you will not be charged for this service.\"     Patient has given verbal consent for Video visit? Yes    Patient would like the video invitation sent by:     Type of service:  Video Visit    Video Start Time:     Video End Time (time video stopped):     Duration:  minutes - see above    Originating Location (pt. Location):     Distant Location (provider location):  [facility] NEUROLOGY CLINIC [place]     Mode of Communication:  Video Conference via [place] (and if not possible then doximity)      Andres Squires MD      --------------------------------------------------------------------------------------------------------------    Rosarionanda Rios is a 58 year old female who is being evaluated via a billable video visit.      Charts reviewed  Consult from  Images reviewed        I have " reviewed and updated the patient's Past Medical History, Social History, Family History and Medication List.    ALLERGIES  Buprenorphine-naloxone, Codeine, and Varenicline    Lasts visit details if there was a last visit:         Medications                                                                                                                                                                                                              14 Review of systems  are negative except for   Patient Active Problem List   Diagnosis     ACP (advance care planning)     Acute alcoholic liver disease     Alcohol use disorder, severe, in sustained remission, dependence (H)     Alcohol withdrawal (H)     Alcoholism in remission (H)     Opioid use disorder, mild, in controlled environment (H)     Anxiety     Back pain, chronic     Benzodiazepine dependence, continuous (H)     Cerebral aneurysm, nonruptured     Controlled substance agreement terminated     Depression due to physical illness     Elevated LFTs     Essential tremor     MCKINLEY (generalized anxiety disorder)     Medial epicondylitis of right elbow     Hypokalemia     Hyperglycemia     Medication reaction     Menopause present     Moderate episode of recurrent major depressive disorder (H)     Pain disorder     Pain disorder with related psychological factors     Parkinsonian tremor (H)     Tremor     Post herpetic neuralgia     Right elbow pain     S/P craniotomy     Tobacco abuse     Tobacco use disorder     Weakness     Abnormal Dopamine scan (DaTSCAN) 2019     Controlled substance agreement signed     Herpes labialis     Meralgia paresthetica of right side        Allergies   Allergen Reactions     Buprenorphine-Naloxone Other (See Comments)     Fuzzy thinking     Codeine Nausea     Abdominal pain; nausea     Varenicline Other (See Comments)     Suicidal       Past Surgical History:   Procedure Laterality Date     ------------OTHER-------------      sebaceous cyst  removal from back     ----------INCISION AND DRAINAGE Right     breast abscess - mastitis     ARTHROSCOPY KNEE Left      C BREAST AUGMENTATION      after had surgery for mastitis and surgery     COLONOSCOPY       CRANIOTOMY  11/2019    for right MCA aneurysm clipping     DILATION AND CURETTAGE       FOOT SURGERY Right      HYSTERECTOMY       IR CAROTID CEREBRAL ANGIOGRAM RIGHT       wisdom teeth extraction       Past Medical History:   Diagnosis Date     Abnormal Dopamine scan (DaTSCAN) 2019 12/15/2019    Examination: Nuclear medicine DATscan for Dopamine Receptor Localization.   Examination: NM BRAIN IMAGING TOMOGRAPHIC (SPECT) DATSCAN   Date: 12/4/2019 3:12 PM   Indication: Resting tremor   Comparison: None   Additional Information: none   Interfering Medications: None   Technique:   The patient initially received 1 ml of Lugol's solution orally prior to the injection of 4.87 mCi of I-123 Ioflupa     Social History     Socioeconomic History     Marital status:      Spouse name: Not on file     Number of children: Not on file     Years of education: Not on file     Highest education level: Not on file   Occupational History     Not on file   Social Needs     Financial resource strain: Not on file     Food insecurity     Worry: Not on file     Inability: Not on file     Transportation needs     Medical: Not on file     Non-medical: Not on file   Tobacco Use     Smoking status: Current Every Day Smoker     Smokeless tobacco: Never Used   Substance and Sexual Activity     Alcohol use: Yes     Drug use: Not on file     Sexual activity: Not on file   Lifestyle     Physical activity     Days per week: Not on file     Minutes per session: Not on file     Stress: Not on file   Relationships     Social connections     Talks on phone: Not on file     Gets together: Not on file     Attends Druze service: Not on file     Active member of club or organization: Not on file     Attends meetings of clubs or  organizations: Not on file     Relationship status: Not on file     Intimate partner violence     Fear of current or ex partner: Not on file     Emotionally abused: Not on file     Physically abused: Not on file     Forced sexual activity: Not on file   Other Topics Concern     Not on file   Social History Narrative    . lives in [place]. Fausto Spouse        Principal Problem:    S/P craniotomy for right MCA aneurysm clipping     Active Problems:    Alcoholic liver disease    Tobacco abuse    MCKINLEY (generalized anxiety disorder)    Alcohol use disorder, severe, in sustained remission, dependence (HC)    Tremor    Moderate episode of recurrent major depressive disorder (HC)    Cerebral aneurysm, nonruptured    Hyperglycemia    Tobacco use disorder     Family History   Problem Relation Age of Onset     Breast Cancer Mother      Alcoholism Mother      Hypertension Father      Cancer Father         Liver and bone cancer     Alcoholism Father      Other - See Comments Sister         eccentric person     Schizophrenia Brother      Alcoholism Brother      Ovarian Cancer Sister      Alcoholism Nephew      Current Outpatient Medications   Medication Sig Dispense Refill     carbidopa-levodopa (SINEMET)  MG tablet Take 1 tablet by mouth 3 times daily At 7 am, 1 pm, and 8 pm 270 tablet 3     lidocaine (XYLOCAINE) 5 % external ointment Apply topically daily as needed       Lidocaine-Menthol 4-5 % PTCH Externally apply topically as needed       mirtazapine (REMERON) 45 MG tablet Take 45 mg by mouth At Bedtime       Misc. Devices (ROLLATOR ULTRA-LIGHT) MISC Walker with front wheels for home use.       polyethylene glycol (MIRALAX) 17 g packet Take 17 g by mouth daily as needed for constipation       pramipexole (MIRAPEX) 0.125 MG tablet Take 3 tabs by mouth 3/day @ 7am, 1pm and 8pm =9/day 810 tablet 3     QUEtiapine (SEROQUEL) 25 MG tablet 25 mg tab by mouth at 7 am, 25 mg tab at 1 pm, and 2 x 25mg tab @ 8 pm = 4/day  "360 tablet 3     QUEtiapine (SEROQUEL) 50 MG tablet        Salicylic Acid 40 % PADS Apply 1 Application topically       senna-docusate (SENOKOT-S/PERICOLACE) 8.6-50 MG tablet Take 1 tablet by mouth daily as needed for constipation       sildenafil (VIAGRA) 100 MG tablet Take 1 tablet (100 mg) by mouth daily as needed 6 tablet 11     traMADol (ULTRAM) 50 MG tablet One-half tablet (25 mg) tab by mouth twice daily as needed for pain. Cannot take same day as valium       valACYclovir (VALTREX) 1000 mg tablet                          Rosario Rios is a 58 year old female who is being evaluated via a billable telephone visit.      Converted to a video visit    The patient has been notified of following:     \"This telephone visit will be conducted via a call between you and your physician/provider. We have found that certain health care needs can be provided without the need for a physical exam.  This service lets us provide the care you need with a short phone conversation.  If a prescription is necessary we can send it directly to your pharmacy.  If lab work is needed we can place an order for that and you can then stop by our lab to have the test done at a later time.    Telephone visits are billed at different rates depending on your insurance coverage. During this emergency period, for some insurers they may be billed the same as an in-person visit.  Please reach out to your insurance provider with any questions.    If during the course of the call the physician/provider feels a telephone visit is not appropriate, you will not be charged for this service.\"    Patient has given verbal consent for Telephone visit?  YES    What phone number would you like to be contacted at?   [phone]    [facility]    Phone call duration: 40 minutes    Fernie Dooley, EMT      Again, thank you for allowing me to participate in the care of your patient.      Sincerely,    Andres Squires, " MD       no

## 2022-08-09 NOTE — H&P ADULT - PROBLEM SELECTOR PLAN 4
Stable. Pt currently not on any medications. Denies any thoughts of harming himself or others.   - A&Ox3 and appears to have some insight  - continue to monitor

## 2022-08-09 NOTE — PROGRESS NOTE ADULT - ASSESSMENT
56M with schizophrenia and recent admission for left hip fx s/p IMN, discharged 8/8/22 presenting to ED for left hip pain and need for placement.    56M with schizophrenia and recent admission for left hip fx s/p IMN, discharged 8/8/22 1630 presenting back to ED 2335 of same day for L hip pain and need for placement.

## 2022-08-09 NOTE — PHYSICAL THERAPY INITIAL EVALUATION ADULT - PERTINENT HX OF CURRENT PROBLEM, REHAB EVAL
Pt is a 56 year old male presenting with left hip pain and need for placement (Pt recently discharged after left hip fracture s/p IMN). PMH listed below.
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
## 2022-08-09 NOTE — BH CONSULTATION LIAISON ASSESSMENT NOTE - OTHER PAST PSYCHIATRIC HISTORY (INCLUDE DETAILS REGARDING ONSET, COURSE OF ILLNESS, INPATIENT/OUTPATIENT TREATMENT)
History of Schizophrenia vs schizoaffective disorder, bipolar type. Long history of treatment at [facility]. Multiple hospitalizations (last at [facility] from 3/19/15) through 3/30/15 for paranoid decompensations and failing to care for self. No prior suicide attempts. No history of violence.

## 2022-08-09 NOTE — PATIENT PROFILE ADULT - FUNCTIONAL ASSESSMENT - BASIC MOBILITY 6.
3-calculated by average/Not able to assess (calculate score using [facility] averaging method)

## 2022-08-09 NOTE — BH CONSULTATION LIAISON ASSESSMENT NOTE - NSBHCHARTREVIEWLAB_PSY_A_CORE FT
10.2   3.64  )-----------( 256      ( 09 Aug 2022 06:50 )             33.8   08-09    138  |  105  |  14  ----------------------------<  91  4.0   |  25  |  0.69    Ca    9.4      09 Aug 2022 06:50  Phos  3.9     08-09  Mg     2.00     08-09    TPro  6.7  /  Alb  4.0  /  TBili  0.8  /  DBili  x   /  AST  11  /  ALT  9   /  AlkPhos  163<H>  08-09

## 2022-08-09 NOTE — BH CONSULTATION LIAISON ASSESSMENT NOTE - NSBHATTESTAPPAMEND_PSY_A_CORE
I have personally seen and examined this patient. I fully participated in the care of this patient. I have made amendments to the documentation where appropriate and otherwise agree with the history, physical exam, and plan as documented by the JERMAN

## 2022-08-09 NOTE — H&P ADULT - NSICDXPASTSURGICALHX_GEN_ALL_CORE_FT
PAST SURGICAL HISTORY:  S/P tonsillectomy      PAST SURGICAL HISTORY:  History of repair of hip fracture     S/P tonsillectomy

## 2022-08-09 NOTE — H&P ADULT - HISTORY OF PRESENT ILLNESS
Pt is a 57 y/o M w/ hx of schizophrenia and recent admission for left hip fx s/p IMN, discharged 8/8/22 presenting to ED for rehab placement.    In ED, vitals T 98, /89, RR 18, O2 99% on Ra  Labs significant for Hb 9.9 stable  XR  ED management:    Pt is a 55 y/o M w/ hx of schizophrenia and recent admission for left hip fx s/p IMN, discharged 8/8/22 presenting to ED for left hip pain. Pt states he was discharged today however feels like his discharge was too soon. He states he went to the [facility] today and spoke with the  there to help him get placement at a hotel however they were unable to assist him. He reports he is able to walk using the can however still has some mild pain. Pt states his ambulation and pain has improved since the surgery. The states he returned to the hospital for help with placement at a hotel or rehab. He otherwise denies any other complaints. No chest pain, SOB, fever, chills, abd pain, n/v/d, headache, urinary symptoms or bleeding. Per discharge documentation, pt was cleared by PT for d/c home with outpatient PT. Pt not eligible for [facility] hospitalization and pt not able to go back to his mother's house.    In ED, vitals T 98, /89, RR 18, O2 99% on Ra  Labs significant for Hb 9.9 stable  XR of hips pending

## 2022-08-09 NOTE — H&P ADULT - PROBLEM SELECTOR PLAN 3
Hb 9.9, improved. No signs or symptoms of bleeding. iron, folate and b12 studies wnl.  - pt likely needs outpatient f/u for colonoscopy

## 2022-08-09 NOTE — PHYSICAL THERAPY INITIAL EVALUATION ADULT - WEIGHT-BEARING RESTRICTIONS: STAND/SIT, REHAB EVAL
[facility]  [address] [address]  [phone]  Dept: [phone]    PRE-OP EVALUATION:  Today's date: 3/15/2017    Yahaira Ramirez (: 1975) presents for pre-operative evaluation assessment as requested by Dr. Adkins.  She requires evaluation and anesthesia risk assessment prior to undergoing surgery/procedure for treatment of rectal carcinoma .  Proposed procedure: Davinci repair    Date of Surgery/ Procedure: 17  Time of Surgery/ Procedure: 7:30 am  Hospital/Surgical Facility: [facility]  Primary Physician: Guille Torre  Type of Anesthesia Anticipated: General    Patient has a Health Care Directive or Living Will:  NO    HPI:                                                      Brief HPI related to upcoming procedure: treatment of rectal carcinoma .  Proposed procedure: Davinci repair    Yahaira Ramirez is a 41 year old female here for preoperative assessment for treatment of rectal carcinoma .  Proposed procedure: Davinci repair    See problem list for active medical problems.  Problems all longstanding and stable, except as noted/documented.  See ROS for pertinent symptoms related to these conditions.                                                                                                  .    MEDICAL HISTORY:                                                      Patient Active Problem List    Diagnosis Date Noted     CARDIOVASCULAR SCREENING; LDL GOAL LESS THAN 160 10/31/2010     Priority: High     Mirena inserted : due for removal  or 2016     Priority: Medium     Lot # [id number]  Exp : 2018  NDC: 95974-473-86  Virginia Baezakulwant CMA         Hypothyroidism, unspecified hypothyroidism type 2016     Priority: Medium     Mild intermittent asthma without complication 2016     Priority: Medium     Major depressive disorder, recurrent episode, mild (H) 2016     Priority: Medium     Hypertrophy of breast  08/01/2006     Priority: Medium      Past Medical History   Diagnosis Date     ASCUS on Pap smear 1/23/03     + HPV     Asthma      ASTHMA - MILD INTERMITTENT 8/23/2005     Calculus of gallbladder without mention of cholecystitis or obstruction      CARDIOVASCULAR SCREENING; LDL GOAL LESS THAN 160 10/31/2010     History of colposcopy with cervical biopsy 2/20/03, 7/23/03     SIMONE I & III, SIMONE I     HUMAN PAPILLOMAVIRUS NOS 3/24/2003     61-low risk     HYPOTHYROIDISM NOS 9/10/2002     Mild major depression (H) 2/22/2011     Other postablative hypothyroidism      iatrogenically induced hypothyroidism after I-31 for hyperthyroidism   abstracted [id number]     Renal stones      HX of     Past Surgical History   Procedure Laterality Date     Hc cautery of cervix, cryocautery  3/23/03     Cholecystectomy       Excise mass upper extremity  12/21/2012     Procedure: EXCISE MASS UPPER EXTREMITY;  Excision Subcutaneous Mass Right Tricep;  Surgeon: Mehul Maldonado MD;  Location: [facility] OR     Mammoplasty reduction bilateral  7/3/2014     Procedure: MAMMOPLASTY REDUCTION BILATERAL;  Surgeon: Yoanna Mccabe MD;  Location:  [facility]     C iud,mirena  4/2016     Ent surgery  03/2016     Tonsilectomy     Colonoscopy Left 12/9/2016     Procedure: COMBINED COLONOSCOPY, SINGLE OR MULTIPLE BIOPSY/POLYPECTOMY BY BIOPSY;  Surgeon: Claudette De La Paz MD;  Location:  [facility]     Current Outpatient Prescriptions   Medication Sig Dispense Refill     EPIDUO 0.1-2.5 % gel APPLY TO AFFECTED AREA NIGHTLY AT BEDTIME.  4     buPROPion (WELLBUTRIN SR) 150 MG 12 hr tablet Take 1 tablet (150 mg) by mouth 2 times daily 180 tablet 3     levothyroxine (SYNTHROID, LEVOTHROID) 125 MCG tablet Take 1 tablet (125 mcg) by mouth daily Then on day 7 take 1 1/2 tablets. 90 tablet 3     mometasone-formoterol (DULERA) 100-5 MCG/ACT oral inhaler Inhale 2 puffs into the lungs 2 times daily 1 Inhaler 11     citalopram (CELEXA) 40 MG tablet Take 1 tablet (40 mg) by  "mouth daily 90 tablet 3     fluticasone (FLONASE) 50 MCG/ACT nasal spray Spray 1-2 sprays into both nostrils daily 30 g 5     IBUPROFEN PO Take 400 mg by mouth as needed.       levalbuterol (XOPENEX HFA) 45 MCG/ACT inhaler Inhale 1-2 puffs into the lungs every 4 hours as needed for shortness of breath / dyspnea. 1 Inhaler 3     OTC products: None, except as noted above    Allergies   Allergen Reactions     Cats      Pollen Extract      Sulfa Drugs Hives      Latex Allergy: NO    Social History   Substance Use Topics     Smoking status: Former Smoker     Quit date: 12/7/2006     Smokeless tobacco: Never Used     Alcohol use No      Comment: 1-2 drinks every 3 months     History   Drug Use No       REVIEW OF SYSTEMS:                                                    C: NEGATIVE for fever, chills, change in weight  E/M: NEGATIVE for ear, mouth and throat problems  R: NEGATIVE for significant cough or SOB  CV: NEGATIVE for chest pain, palpitations or peripheral edema  GI: NEGATIVE for nausea, abdominal pain, heartburn, or change in bowel habits  : NEGATIVE for frequency, dysuria, or hematuria  M: NEGATIVE for significant arthralgias or myalgia  N: NEGATIVE for weakness, dizziness or paresthesias  H: NEGATIVE for bleeding problems  P: NEGATIVE for changes in mood or affect    EXAM:                                                    /80  Pulse 98  Temp 98.8  F (37.1  C) (Oral)  Ht 5' 6\" (1.676 m)  Wt 269 lb 14.4 oz (122.4 kg)  SpO2 99%  BMI 43.56 kg/m2    GENERAL APPEARANCE: healthy, alert and no distress     EYES: EOMI, PERRL     HENT: ear canals and TM's normal and nose and mouth without ulcers or lesions     NECK: no adenopathy, no asymmetry, masses, or scars and thyroid normal to palpation     RESP: lungs clear to auscultation - no rales, rhonchi or wheezes     CV: regular rates and rhythm, normal S1 S2, no S3 or S4 and no murmur, click or rub     ABDOMEN:  Obese, soft, nontender, no HSM or masses and bowel " sounds normal, obese     PSYCH: mentation appears normal and affect normal/bright    DIAGNOSTICS:                                                      EKG: Not repeated, see copy 1/18/2017, due to non-vascular surgery and age <65 and without cardiac risk factors  Labs Drawn and in Process:   Unresulted Labs Ordered in the Past 30 Days of this Admission     No orders found from 1/14/2017 to 3/16/2017.          Recent Labs   Lab Test  01/02/17   0827  10/28/16   0910  04/01/16   1406   HGB  12.1  13.1  12.9   PLT  391  365  421   INR  0.89   --    --    NA   --   139  141   POTASSIUM   --   4.6  4.2   CR   --   0.73  0.76        IMPRESSION:                                                    Reason for surgery/procedure: treatment of rectal carcinoma .  Proposed procedure: Davinci repair    The proposed surgical procedure is considered mild risk.    REVISED CARDIAC RISK INDEX  The patient has the following serious cardiovascular risks for perioperative complications such as (MI, PE, VFib and 3  AV Block):  No serious cardiac risks  INTERPRETATION: 1 risks: Class II (low risk - 0.9% complication rate)    The patient has the following additional risks for perioperative complications:  No identified additional risks      ICD-10-CM    1. Preop general physical exam Z01.818 Potassium     Glucose     Hemoglobin     Platelet count     INR     Partial thromboplastin time     Hemoglobin A1c   2. Malignant neoplasm of rectum (H) C20    3. Mild intermittent asthma without complication J45.20    4. Major depressive disorder, recurrent episode, mild (H) F33.0      (Z01.818) Preop general physical exam  (primary encounter diagnosis)  Comment: as ordered  Plan: Potassium, Glucose, Hemoglobin, Platelet count,        INR, Partial thromboplastin time, Hemoglobin         A1c            (C20) Malignant neoplasm of rectum (H)  Comment: surgery as scheduled  Plan:     (J45.20) Mild intermittent asthma without complication  Comment: stable  per ACT  Plan:     (F33.0) Major depressive disorder, recurrent episode, mild (H)  Comment: stable per PHQ9  Plan:         RECOMMENDATIONS:                                                      --Consult hospital rounder / IM to assist post-op medical management    Pulmonary Risk  Incentive spirometry post op  Maximize Asthma treatment inhalers as ordered     --Patient is to take all scheduled medications on the day of surgery EXCEPT for modifications listed below.    Anticoagulant or Antiplatelet Medication Use  ASPIRIN/NSAIDS: Discontinue 7-10 days prior to procedure to reduce bleeding risk.  It should be resumed post-operatively.      APPROVAL GIVEN to proceed with proposed procedure, without further diagnostic evaluation     Signed Electronically by: Guille Torre MD    Copy of this evaluation report is provided to requesting physician, Dr. Lucille Vicente Preop Guidelines   full weight-bearing

## 2022-08-09 NOTE — PROGRESS NOTE ADULT - SUBJECTIVE AND OBJECTIVE BOX
Patient is a 56y old  Male who presents with a chief complaint of leg pain, placement    SUBJECTIVE / OVERNIGHT EVENTS:    Patient seen in CDU 10  states came back due to housing and the [employer] thinking he was took weak on his leg and was "kicked out" too soon  Reports LLE swelling is better  talks about being a plantiff, about some court issue with mother, about massages and wattage 30 hunter vs 20 hunter--tangential but pleasant   states he can go to a hotel in [place] if we can coordinate     MEDICATIONS  (STANDING):  enoxaparin Injectable 40 milliGRAM(s) SubCutaneous every 24 hours    MEDICATIONS  (PRN):  acetaminophen     Tablet 650 milliGRAM(s) Oral every 6 hours PRN Temp greater or equal to 38C (100.4F), Mild Pain (1 - 3)    T(C): 37.2 (08-09-22 @ 14:29), Max: 37.2 (08-09-22 @ 10:08)  HR: 65 (08-09-22 @ 14:29) (65 - 93)  BP: 121/75 (08-09-22 @ 14:29) (115/65 - 145/89)  RR: 18 (08-09-22 @ 14:29) (17 - 18)  SpO2: 100% (08-09-22 @ 14:29) (99% - 100%)    PHYSICAL EXAM:  GENERAL: NAD, well-developed  CHEST/LUNG: Clear to auscultation bilaterally; No wheeze  HEART: Regular rate and rhythm; No murmurs, rubs, or gallops  ABDOMEN: Soft, Nontender, Nondistended; Bowel sounds present  EXTREMITIES:   LLE edema, L incisions c/d/i  PSYCH: disorganized  NEUROLOGY: non-focal, standing up and bedside and bent over, seems to be ambulating well    LABS:                        10.2   3.64  )-----------( 256      ( 09 Aug 2022 06:50 )             33.8     08-09    138  |  105  |  14  ----------------------------<  91  4.0   |  25  |  0.69    Ca    9.4      09 Aug 2022 06:50  Phos  3.9     08-09  Mg     2.00     08-09    TPro  6.7  /  Alb  4.0  /  TBili  0.8  /  DBili  x   /  AST  11  /  ALT  9   /  AlkPhos  163<H>  08-09    Consultant(s) Notes Reviewed:    Care Discussed with Consultants/Other Providers: psych NP   Patient is a 56y old  Male who presents with a chief complaint of leg pain, placement    SUBJECTIVE / OVERNIGHT EVENTS:    Patient seen in CDU 10  states came back due to housing and the [employer] thinking he was took weak on his leg and was "kicked out" too soon and that he needs to be in hospital for more time   Reports LLE swelling is better that prior  talks about being a plaintiff, about some court issue with mother, about massages and wattage 30 hunter vs 20 hunter--tangential but pleasant however unclear if lacks understand re: fx and repair and how despite not being at full baseline is still able to walk  states he can go to a hotel in [place] if we can coordinate   states he should get court mandated pain meds 5x per day and i can speak to a      MEDICATIONS  (STANDING):  enoxaparin Injectable 40 milliGRAM(s) SubCutaneous every 24 hours    MEDICATIONS  (PRN):  acetaminophen     Tablet 650 milliGRAM(s) Oral every 6 hours PRN Temp greater or equal to 38C (100.4F), Mild Pain (1 - 3)    T(C): 37.2 (08-09-22 @ 14:29), Max: 37.2 (08-09-22 @ 10:08)  HR: 65 (08-09-22 @ 14:29) (65 - 93)  BP: 121/75 (08-09-22 @ 14:29) (115/65 - 145/89)  RR: 18 (08-09-22 @ 14:29) (17 - 18)  SpO2: 100% (08-09-22 @ 14:29) (99% - 100%)    PHYSICAL EXAM:  GENERAL: NAD, well-developed  CHEST/LUNG: Clear to auscultation bilaterally; No wheeze  HEART: Regular rate and rhythm; No murmurs, rubs, or gallops  ABDOMEN: Soft, Nontender, Nondistended; Bowel sounds present  EXTREMITIES:   LLE edema, L incisions c/d/i  PSYCH: disorganized  NEUROLOGY: non-focal, standing up and bedside and bent over, seems to be ambulating well    LABS:                        10.2   3.64  )-----------( 256      ( 09 Aug 2022 06:50 )             33.8     08-09    138  |  105  |  14  ----------------------------<  91  4.0   |  25  |  0.69    Ca    9.4      09 Aug 2022 06:50  Phos  3.9     08-09  Mg     2.00     08-09    TPro  6.7  /  Alb  4.0  /  TBili  0.8  /  DBili  x   /  AST  11  /  ALT  9   /  AlkPhos  163<H>  08-09    Consultant(s) Notes Reviewed:    Care Discussed with Consultants/Other Providers: psych NP

## 2022-08-09 NOTE — PHYSICAL THERAPY INITIAL EVALUATION ADULT - PATIENT PROFILE REVIEW, REHAB EVAL
Mary from [facility] is calling to see how to proceed after the negative test  Patient was tested on 4/11/2021 and resulted negative  Mary stated it is almost a week since his exposure and the patient is not currently having any symptoms       Mary can be reached at [phone] PT evaluate and treat orders received: Increase as tolerated. Consult with SIOBHAN Napoles, pt may participate in PT evaluation./yes

## 2022-08-09 NOTE — BH CONSULTATION LIAISON ASSESSMENT NOTE - NSBHATTESTCOMMENTATTENDFT_PSY_A_CORE
Chart reviewed. Pt known to me from previous admission. Agree with above assessment. Does not appear to have great insight into his mental health issues, but also does not appear to meet criteria for involuntary psych care. Will continue to follow, though dispo likely not inpatient psych.

## 2022-08-10 ENCOUNTER — TRANSCRIPTION ENCOUNTER (OUTPATIENT)
Age: 57
End: 2022-08-10

## 2022-08-10 LAB
ALBUMIN SERPL ELPH-MCNC: 3.7 G/DL — SIGNIFICANT CHANGE UP (ref 3.3–5)
ALP SERPL-CCNC: 146 U/L — HIGH (ref 40–120)
ALT FLD-CCNC: 10 U/L — SIGNIFICANT CHANGE UP (ref 4–41)
ANION GAP SERPL CALC-SCNC: 9 MMOL/L — SIGNIFICANT CHANGE UP (ref 7–14)
AST SERPL-CCNC: 9 U/L — SIGNIFICANT CHANGE UP (ref 4–40)
BASOPHILS # BLD AUTO: 0.03 K/UL — SIGNIFICANT CHANGE UP (ref 0–0.2)
BASOPHILS NFR BLD AUTO: 1 % — SIGNIFICANT CHANGE UP (ref 0–2)
BILIRUB SERPL-MCNC: 0.6 MG/DL — SIGNIFICANT CHANGE UP (ref 0.2–1.2)
BUN SERPL-MCNC: 16 MG/DL — SIGNIFICANT CHANGE UP (ref 7–23)
CALCIUM SERPL-MCNC: 9 MG/DL — SIGNIFICANT CHANGE UP (ref 8.4–10.5)
CHLORIDE SERPL-SCNC: 108 MMOL/L — HIGH (ref 98–107)
CO2 SERPL-SCNC: 24 MMOL/L — SIGNIFICANT CHANGE UP (ref 22–31)
CREAT SERPL-MCNC: 0.73 MG/DL — SIGNIFICANT CHANGE UP (ref 0.5–1.3)
EGFR: 106 ML/MIN/1.73M2 — SIGNIFICANT CHANGE UP
EOSINOPHIL # BLD AUTO: 0.16 K/UL — SIGNIFICANT CHANGE UP (ref 0–0.5)
EOSINOPHIL NFR BLD AUTO: 5.1 % — SIGNIFICANT CHANGE UP (ref 0–6)
GLUCOSE SERPL-MCNC: 95 MG/DL — SIGNIFICANT CHANGE UP (ref 70–99)
HCT VFR BLD CALC: 31.5 % — LOW (ref 39–50)
HCV AB S/CO SERPL IA: 0.08 S/CO — SIGNIFICANT CHANGE UP (ref 0–0.99)
HCV AB SERPL-IMP: SIGNIFICANT CHANGE UP
HGB BLD-MCNC: 9.6 G/DL — LOW (ref 13–17)
IANC: 1.33 K/UL — LOW (ref 1.8–7.4)
IMM GRANULOCYTES NFR BLD AUTO: 0 % — SIGNIFICANT CHANGE UP (ref 0–1.5)
LYMPHOCYTES # BLD AUTO: 1.18 K/UL — SIGNIFICANT CHANGE UP (ref 1–3.3)
LYMPHOCYTES # BLD AUTO: 37.8 % — SIGNIFICANT CHANGE UP (ref 13–44)
MAGNESIUM SERPL-MCNC: 1.9 MG/DL — SIGNIFICANT CHANGE UP (ref 1.6–2.6)
MCHC RBC-ENTMCNC: 26.4 PG — LOW (ref 27–34)
MCHC RBC-ENTMCNC: 30.5 GM/DL — LOW (ref 32–36)
MCV RBC AUTO: 86.5 FL — SIGNIFICANT CHANGE UP (ref 80–100)
MONOCYTES # BLD AUTO: 0.42 K/UL — SIGNIFICANT CHANGE UP (ref 0–0.9)
MONOCYTES NFR BLD AUTO: 13.5 % — SIGNIFICANT CHANGE UP (ref 2–14)
NEUTROPHILS # BLD AUTO: 1.33 K/UL — LOW (ref 1.8–7.4)
NEUTROPHILS NFR BLD AUTO: 42.6 % — LOW (ref 43–77)
NRBC # BLD: 0 /100 WBCS — SIGNIFICANT CHANGE UP
NRBC # FLD: 0 K/UL — SIGNIFICANT CHANGE UP
PHOSPHATE SERPL-MCNC: 3.5 MG/DL — SIGNIFICANT CHANGE UP (ref 2.5–4.5)
PLATELET # BLD AUTO: 198 K/UL — SIGNIFICANT CHANGE UP (ref 150–400)
POTASSIUM SERPL-MCNC: 3.8 MMOL/L — SIGNIFICANT CHANGE UP (ref 3.5–5.3)
POTASSIUM SERPL-SCNC: 3.8 MMOL/L — SIGNIFICANT CHANGE UP (ref 3.5–5.3)
PROT SERPL-MCNC: 6.6 G/DL — SIGNIFICANT CHANGE UP (ref 6–8.3)
RBC # BLD: 3.64 M/UL — LOW (ref 4.2–5.8)
RBC # FLD: 15.7 % — HIGH (ref 10.3–14.5)
SODIUM SERPL-SCNC: 141 MMOL/L — SIGNIFICANT CHANGE UP (ref 135–145)
WBC # BLD: 3.12 K/UL — LOW (ref 3.8–10.5)
WBC # FLD AUTO: 3.12 K/UL — LOW (ref 3.8–10.5)

## 2022-08-10 PROCEDURE — 99252 IP/OBS CONSLTJ NEW/EST SF 35: CPT

## 2022-08-10 PROCEDURE — 93010 ELECTROCARDIOGRAM REPORT: CPT

## 2022-08-10 PROCEDURE — 72170 X-RAY EXAM OF PELVIS: CPT | Mod: 26

## 2022-08-10 PROCEDURE — 99232 SBSQ HOSP IP/OBS MODERATE 35: CPT

## 2022-08-10 PROCEDURE — 99233 SBSQ HOSP IP/OBS HIGH 50: CPT

## 2022-08-10 RX ADMIN — Medication 650 MILLIGRAM(S): at 06:22

## 2022-08-10 RX ADMIN — Medication 650 MILLIGRAM(S): at 21:57

## 2022-08-10 RX ADMIN — ENOXAPARIN SODIUM 40 MILLIGRAM(S): 100 INJECTION SUBCUTANEOUS at 21:57

## 2022-08-10 RX ADMIN — Medication 650 MILLIGRAM(S): at 13:41

## 2022-08-10 RX ADMIN — Medication 325 MILLIGRAM(S): at 13:37

## 2022-08-10 RX ADMIN — Medication 500 MILLIGRAM(S): at 13:37

## 2022-08-10 NOTE — BH CONSULTATION LIAISON PROGRESS NOTE - NSBHATTESTCOMMENTATTENDFT_PSY_A_CORE
Chart reviewed. Pt seen, presents as calm though disorganized in thinking, and chronically delusional. He does not present with SI, HI, AVH, or other symptoms. While he has poor insight, he does not appear to meet criteria for involuntary psych admission. Offered him voluntary inpatient psych and he declined politely. Discussed with primary medical MD as well as  leadership (the latter will look into alternative placement). Will continue to follow.

## 2022-08-10 NOTE — DISCHARGE NOTE PROVIDER - CARE PROVIDERS DIRECT ADDRESSES
,DirectAddress_Unknown ,DirectAddress_Unknown,[email or web address]

## 2022-08-10 NOTE — DISCHARGE NOTE PROVIDER - PROVIDER TOKENS
PROVIDER:[TOKEN:[[id number]]] PROVIDER:[TOKEN:[[id number]]],PROVIDER:[TOKEN:[[id number]]]

## 2022-08-10 NOTE — DISCHARGE NOTE PROVIDER - NSDCCPCAREPLAN_GEN_ALL_CORE_FT
PRINCIPAL DISCHARGE DIAGNOSIS  Diagnosis: Fracture of left hip  Assessment and Plan of Treatment: You had surgery on 7/17 and discharged home on 8/8 when you returned back to the emergency room with pain and requesting assistance with placement. Your hip xray was unchanged from previous. You were seen by physical therapy who recommended home with out patient physical therapy. You had an xray of your pelvis that showed ---         SECONDARY DISCHARGE DIAGNOSES  Diagnosis: Leg swelling  Assessment and Plan of Treatment: Your leg swelling was likely due to your recent surgery. You had an ultrasound on your last admission that was negative for a blood clot. You have stated that the swelling has now improved. Please follow up with your primary care provider for further medical management.    Diagnosis: Anemia  Assessment and Plan of Treatment: Follow-up with your outpatient provider for further monitoring of your blood counts. Monitor for signs/symptoms indicating worsening of disease, such as, easy bleeding/bruising, pale skin, fatigue, dizziness, increased heart rate, or chest pain.    Diagnosis: Paranoid schizophrenia  Assessment and Plan of Treatment: You have a history of paranoid schizophrenia for which   you have stopped your meds de to refusal. Please continue to take your resperidone medication as prescribed and follow up with your out patient provider for further medical managment.     PRINCIPAL DISCHARGE DIAGNOSIS  Diagnosis: Fracture of left hip  Assessment and Plan of Treatment: You had surgery on 7/17 and discharged home on 8/8 when you returned back to the emergency room with pain and requesting assistance with placement. Your hip xray was unchanged from previous. You are to be discharged to rehab for more physical therapy. You are to follow up with orthopedics in 2-3 weeks.      SECONDARY DISCHARGE DIAGNOSES  Diagnosis: Leg swelling  Assessment and Plan of Treatment: Your leg swelling was likely due to your recent surgery. You had an ultrasound on your last admission that was negative for a blood clot. You have stated that the swelling has now improved. Please follow up with your primary care provider for further medical management.    Diagnosis: Anemia  Assessment and Plan of Treatment: Follow-up with your outpatient provider for further monitoring of your blood counts. Monitor for signs/symptoms indicating worsening of disease, such as, easy bleeding/bruising, pale skin, fatigue, dizziness, increased heart rate, or chest pain.    Diagnosis: Paranoid schizophrenia  Assessment and Plan of Treatment: You have a history of paranoid schizophrenia for which   you have stopped your meds de to refusal. Please continue to take your resperidone medication as prescribed and follow up with your out patient provider for further medical managment.     PRINCIPAL DISCHARGE DIAGNOSIS  Diagnosis: Fracture of left hip  Assessment and Plan of Treatment: You had surgery on 7/17 and discharged home on 8/8 when you returned back to the emergency room with pain and requesting assistance with placement. Your hip xray was unchanged from previous. You are to be discharged to rehab for more physical therapy. You are to follow up with orthopedics in 2-3 weeks. Continue with aspirin 325 mg twice daily until follow up with Dr. Stafford.      SECONDARY DISCHARGE DIAGNOSES  Diagnosis: Leg swelling  Assessment and Plan of Treatment: Your leg swelling was likely due to your recent surgery. You had an ultrasound on your last admission that was negative for a blood clot. You have stated that the swelling has now improved. Please follow up with your primary care provider for further medical management.    Diagnosis: Anemia  Assessment and Plan of Treatment: Follow-up with your outpatient provider for further monitoring of your blood counts. Monitor for signs/symptoms indicating worsening of disease, such as, easy bleeding/bruising, pale skin, fatigue, dizziness, increased heart rate, or chest pain.    Diagnosis: Paranoid schizophrenia  Assessment and Plan of Treatment: You have a history of paranoid schizophrenia for which   you have stopped your meds de to refusal. Please continue to take your resperidone medication as prescribed and follow up with your out patient provider for further medical managment.

## 2022-08-10 NOTE — BH CONSULTATION LIAISON PROGRESS NOTE - CURRENT MEDICATION
MEDICATIONS  (STANDING):  acetaminophen     Tablet .. 650 milliGRAM(s) Oral every 8 hours  ascorbic acid 500 milliGRAM(s) Oral daily  enoxaparin Injectable 40 milliGRAM(s) SubCutaneous every 24 hours  ferrous    sulfate 325 milliGRAM(s) Oral daily  risperiDONE   Tablet 1 milliGRAM(s) Oral at bedtime    MEDICATIONS  (PRN):  acetaminophen     Tablet .. 650 milliGRAM(s) Oral every 6 hours PRN Temp greater or equal to 38C (100.4F), Mild Pain (1 - 3)  ibuprofen  Tablet. 400 milliGRAM(s) Oral every 6 hours PRN Moderate Pain (4 - 6), Severe Pain (7 - 10)

## 2022-08-10 NOTE — DISCHARGE NOTE PROVIDER - NSDCMRMEDTOKEN_GEN_ALL_CORE_FT
acetaminophen 325 mg oral tablet: 3 tab(s) orally every 8 hours  Adult Aspirin 325 mg oral tablet: 1 tab(s) orally 2 times a day   Physical Therapy :   Walker - 1 piece :    acetaminophen 325 mg oral tablet: 2 tab(s) orally every 6 hours, As needed, Temp greater or equal to 38C (100.4F), Mild Pain (1 - 3)  ascorbic acid 500 mg oral tablet: 1 tab(s) orally once a day  ferrous sulfate 325 mg (65 mg elemental iron) oral tablet: 1 tab(s) orally once a day  ibuprofen 400 mg oral tablet: 1 tab(s) orally every 6 hours, As needed, Moderate Pain (4 - 6), Severe Pain (7 - 10)  Physical Therapy :   risperiDONE 1 mg oral tablet: 1 tab(s) orally once a day (at bedtime)  Walker - 1 piece :

## 2022-08-10 NOTE — PROGRESS NOTE ADULT - SUBJECTIVE AND OBJECTIVE BOX
Patient is a 56y old  Male who presents with a chief complaint of leg pain, placement    SUBJECTIVE / OVERNIGHT EVENTS:    Doing well, denies pain  Keeps talking about court, how he went there to contend he was dc too early and that he should stay here until 8/20  No CP, SOB, f/c/n/v/d    MEDICATIONS  (STANDING):  acetaminophen     Tablet .. 650 milliGRAM(s) Oral every 8 hours  ascorbic acid 500 milliGRAM(s) Oral daily  enoxaparin Injectable 40 milliGRAM(s) SubCutaneous every 24 hours  ferrous    sulfate 325 milliGRAM(s) Oral daily  risperiDONE   Tablet 1 milliGRAM(s) Oral at bedtime    MEDICATIONS  (PRN):  acetaminophen     Tablet .. 650 milliGRAM(s) Oral every 6 hours PRN Temp greater or equal to 38C (100.4F), Mild Pain (1 - 3)  ibuprofen  Tablet. 400 milliGRAM(s) Oral every 6 hours PRN Moderate Pain (4 - 6), Severe Pain (7 - 10)    T(C): 36.9 (08-10-22 @ 06:23), Max: 37.3 (08-09-22 @ 17:30)  HR: 65 (08-10-22 @ 06:23) (65 - 82)  BP: 120/69 (08-10-22 @ 06:23) (110/64 - 126/72)  RR: 18 (08-10-22 @ 06:23) (18 - 18)  SpO2: 100% (08-10-22 @ 06:23) (100% - 100%)    PHYSICAL EXAM:  GENERAL: NAD, well-developed  CHEST/LUNG: Clear to auscultation bilaterally; No wheeze  HEART: Regular rate and rhythm; No murmurs, rubs, or gallops  ABDOMEN: Soft, Nontender, Nondistended; Bowel sounds present  EXTREMITIES:   LLE edema, 3 L incisions c/d/i  PSYCH: disorganized  NEUROLOGY: non-focal, standing up and bedside and bent over, seems to be ambulating well      LABS:                        9.6    3.12  )-----------( 198      ( 10 Aug 2022 06:12 )             31.5     08-10    141  |  108<H>  |  16  ----------------------------<  95  3.8   |  24  |  0.73    Ca    9.0      10 Aug 2022 06:12  Phos  3.5     08-10  Mg     1.90     08-10    TPro  6.6  /  Alb  3.7  /  TBili  0.6  /  DBili  x   /  AST  9   /  ALT  10  /  AlkPhos  146<H>  08-10    Consultant(s) Notes Reviewed:    Care Discussed with Consultants/Other Providers:

## 2022-08-10 NOTE — CONSULT NOTE ADULT - ASSESSMENT
A/P: 56y Male s/p Left intramedullary nailing on 7/17/22  - Pain control  - FU AP pelvis  - PT/OT  - WBAT LLE  - Plan to be discussed with Dr. Stafford A/P: 55 y/o Male s/p Left intramedullary nailing on 7/17/22  - Pain control  - FU AP pelvis  - PT/OT  - WBAT LLE  - FU with Dr. Stafford as an outpatient in 2-3 weeks, call [phone] for appointment  - Plan discussed with Dr. Stafford

## 2022-08-10 NOTE — PROGRESS NOTE ADULT - PROBLEM SELECTOR PLAN 5
Lovenox ppx, asa 325 BID on dc (7/17 x 6 wks)  regular diet  PT rec OP PT  dispo planning, need CM/SW re: safe dispo as readmitted same day as dc

## 2022-08-10 NOTE — CONSULT NOTE ADULT - SUBJECTIVE AND OBJECTIVE BOX
Orthopedics Consult Note:  57 y/o Male with PMH of schizoophrenia, bipolar disorder and left intramedullary nailing on 7/17/22 with Dr. Stafford presents to [facility] for possible rehab placement. Pt was discharged from the hospital on 8/8/22 and felt like he could not perform all activities independently at home. Pt is post-op day 24 from surgery, no staples/sutures present. Medicine Hospitalist called as patient is likely to be lost for follow up given psychiatric history, asked if patient could be assessed while inpatient. Pt denies new injuries/trauma, numbness/tingling.      PAST MEDICAL & SURGICAL HISTORY:  Paranoid schizophrenia  Bipolar 1 disorder  s/p tonsillectomy  History of repair of hip fracture      MEDICATIONS  (STANDING):  acetaminophen     Tablet .. 650 milliGRAM(s) Oral every 8 hours  ascorbic acid 500 milliGRAM(s) Oral daily  enoxaparin Injectable 40 milliGRAM(s) SubCutaneous every 24 hours  ferrous    sulfate 325 milliGRAM(s) Oral daily  risperiDONE   Tablet 1 milliGRAM(s) Oral at bedtime    Allergies  lactose (Unknown)  No Known Drug Allergies  Intolerances      Vital Signs Last 24 Hrs  T(C): 36.9 (08-10-22 @ 13:49), Max: 37.3 (08-09-22 @ 17:30)  T(F): 98.5 (08-10-22 @ 13:49), Max: 99.1 (08-09-22 @ 17:30)  HR: 64 (08-10-22 @ 13:49) (64 - 82)  BP: 138/72 (08-10-22 @ 13:49) (110/64 - 138/72)  BP(mean): --  RR: 16 (08-10-22 @ 13:49) (16 - 18)  SpO2: 100% (08-10-22 @ 13:49) (100% - 100%)                          9.6    3.12  )-----------( 198      ( 10 Aug 2022 06:12 )             31.5     10 Aug 2022 06:12    141    |  108    |  16     ----------------------------<  95     3.8     |  24     |  0.73     Ca    9.0        10 Aug 2022 06:12  Phos  3.5       10 Aug 2022 06:12  Mg     1.90      10 Aug 2022 06:12    TPro  6.6    /  Alb  3.7    /  TBili  0.6    /  DBili  x      /  AST  9      /  ALT  10     /  AlkPhos  146    10 Aug 2022 06:12        Physical Exam:  General: NAD, alert and oriented      Left LE:   Steri-strips noted over incisions.  Full baseline painless ROM at ankle and toes.  Unable to actively SLR, no pain with passive SLR   Sensation intact to light touch distally, DP 1+  Compartments soft and compressible      Imaging: XR left hip and femur with hardware intact, without any acute fractures

## 2022-08-10 NOTE — DISCHARGE NOTE PROVIDER - NSFOLLOWUPCLINICS_GEN_ALL_ED_FT
[facility] Psychiatry  Psychiatry  [address] [place] [address]  Phone: [phone]  Fax:     [facility] Internal Medicine  General Internal Medicine  [address] [place] [address]  Phone: [phone]  Fax:

## 2022-08-10 NOTE — PROGRESS NOTE ADULT - PROBLEM SELECTOR PLAN 4
Seems disorganized and tangential, stopped meds last admit 2/2 refusal  - psych f/u as unclear if able to care for self given delusions however defer to their assessment

## 2022-08-10 NOTE — DISCHARGE NOTE PROVIDER - CARE PROVIDER_API CALL
KATERINA BAUMANN  Internal Medicine  [address] [place] [address]  Phone: [phone]  Fax: [phone]  Follow Up Time:    KATERINA BAUMANN  Internal Medicine  [address] [address]  Phone: [phone]  Fax: [phone]  Follow Up Time:     Lety Stafford (MD; MPH)  Orthopaedic Surgery  [address] [place] [address] [address]  [place] [address]  Phone: [phone]  Fax: [phone]  Follow Up Time:

## 2022-08-10 NOTE — BH CONSULTATION LIAISON PROGRESS NOTE - NSBHCHARTREVIEWLAB_PSY_A_CORE FT
9.6    3.12  )-----------( 198      ( 10 Aug 2022 06:12 )             31.5   08-10    141  |  108<H>  |  16  ----------------------------<  95  3.8   |  24  |  0.73    Ca    9.0      10 Aug 2022 06:12  Phos  3.5     08-10  Mg     1.90     08-10    TPro  6.6  /  Alb  3.7  /  TBili  0.6  /  DBili  x   /  AST  9   /  ALT  10  /  AlkPhos  146<H>  08-10

## 2022-08-10 NOTE — PROGRESS NOTE ADULT - ASSESSMENT
56M with schizophrenia and recent admission for left hip fx s/p IMN, discharged 8/8/22 1630 presenting back to ED 2335 of same day for L hip pain and need for placement.

## 2022-08-10 NOTE — DISCHARGE NOTE PROVIDER - NSDCFUADDAPPT_GEN_ALL_CORE_FT
[facility] [phone] (Walk-in/appt M-F 9am-7pm); [address] [place], [place]  [facility] [phone] (Walk-in/appt M-F 9am-7pm); [address] [place], [place]    Follow up with orthopedic with Dr. Stafford as an outpatient in 2-3 weeks, call [phone] for appointment
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
## 2022-08-10 NOTE — PROGRESS NOTE ADULT - PROBLEM SELECTOR PLAN 2
LLE swelling > RLE. Likely in setting of recent surgery, noted last admission   - b/l LE U/S negative for DVT 7/26  - states swelling has improved from prior

## 2022-08-10 NOTE — PROGRESS NOTE ADULT - PROBLEM SELECTOR PLAN 1
Pt s/p left IMN 7/17/22 w/ ortho and d/sona 8/8/22 to hotel. Per documentation pt was cleared by PT for home. Pt however returns to ED requesting help with rehab or hotel placement.  - c/w pain control w/ Tylenol and NSAIDs prn  - hip x-ray unremarkable/unchanged  - seems to be ambulating w/o difficulty  - PT rec OP PT  - d/w ortho PA today as now >2 wks from surgery when should have had OP f/u, rec pelvic x-ray to complete imaging and likely will not need further eval

## 2022-08-10 NOTE — DISCHARGE NOTE PROVIDER - HOSPITAL COURSE
56M with schizophrenia and recent admission for left hip fx s/p IMN, discharged 8/8/22 1630 presenting back to ED 2335 of same day for L hip pain and need for placement.     Hospital Course:    Fracture of left hip.   Pt s/p left IMN 7/17/22 w/ ortho and d/sona 8/8/22 to hotel. Per documentation pt was cleared by PT for home. Pt however returns to ED requesting help with rehab or hotel placement. Pain controlled w/ Tylenol and NSAIDs prn. Hip x-ray unremarkable/unchanged. Seems to be ambulating w/o difficulty. PT rec OP PT. D/w ortho PA today as now >2 wks from surgery when should have had OP f/u, rec pelvic x-ray to complete imaging and likely will not need further eval. Pelvic xray showing -----    Leg swelling.   LLE swelling > RLE. Likely in setting of recent surgery, noted last admission. B/L LE U/S negative for DVT 7/22. States swelling has improved from prior.    Anemia.   Likely ERICK given low heard sat 11% however some component AOCD, suspect some anemia 2/2 post-op loses. 10.3 on last admit downtrended to 7.7 zach now uptrending. PO iron. OP f/u for routine screening including colonoscopy.    Paranoid schizophrenia.   Seems disorganized and tangential, stopped meds last admit 2/2 refusal. Psych f/u as unclear if able to care for self given delusions however defer to their assessment. Psych recommending ------      On ___ this case was reviewed with  ____, the patient is medically stable and optimized for discharge. All medications were reviewed and prescriptions were sent to mutually agreed upon pharmacy.    56M with schizophrenia and recent admission for left hip fx s/p IMN, discharged 8/8/22 1630 presenting back to ED 2335 of same day for L hip pain and need for placement.     Hospital Course:    Fracture of left hip.   Pt s/p left IMN 7/17/22 w/ ortho and d/sona 8/8/22 to hotel. Per documentation pt was cleared by PT for home. Pt however returns to ED requesting help with rehab or hotel placement. Pain controlled w/ Tylenol and NSAIDs prn. Hip x-ray unremarkable/unchanged. Seems to be ambulating w/o difficulty. PT rec OP PT. D/w ortho PA today as now >2 wks from surgery when should have had OP f/u, rec pelvic x-ray to complete imaging and likely will not need further eval. Pelvic xray showing Intact and unremarkable partially visualized upper end of previously seen left femoral IM keiry/nail with proximal compression screw.  Redemonstrated displaced lesser trochanter avulsion fracture fragment. Remainder of fracture appears anatomically aligned on this study. No dislocations or acute appearing fractures in remaining imaged regions.  Intact pelvic and obturator rings and symmetrically aligned and spaced SI joints pubic symphysis. Preserved bilateral hip joint spaces and no gross radiographic evidence for AVN.   No discrete lytic or blastic lesions. Pt to follow up with orthopedics as outpatient in 2-3 weeks.     Leg swelling.   LLE swelling > RLE. Likely in setting of recent surgery, noted last admission. B/L LE U/S negative for DVT 7/22. States swelling has improved from prior.    Anemia.   Likely ERICK given low heard sat 11% however some component AOCD, suspect some anemia 2/2 post-op loses. 10.3 on last admit downtrended to 7.7 zach now uptrending. PO iron. OP f/u for routine screening including colonoscopy.    Paranoid schizophrenia.   Seems disorganized and tangential, stopped meds last admit 2/2 refusal. Psych f/u as unclear if able to care for self given delusions however defer to their assessment. Psych recommending ------      On 8/18/2022, this case was reviewed with Dr. Malagon, the patient is medically stable and optimized for discharge. All medications were reviewed and prescriptions were sent to mutually agreed upon pharmacy.    56M with schizophrenia and recent admission for left hip fx s/p IMN, discharged 8/8/22 1630 presenting back to ED 2335 of same day for L hip pain and need for placement.     Hospital Course:    Fracture of left hip.   Pt s/p left IMN 7/17/22 w/ ortho and d/sona 8/8/22 to hotel. Per documentation pt was cleared by PT for home. Pt however returns to ED requesting help with rehab or hotel placement. Pain controlled w/ Tylenol and NSAIDs prn. Hip x-ray unremarkable/unchanged. Seems to be ambulating w/o difficulty. PT rec OP PT. D/w ortho PA today as now >2 wks from surgery when should have had OP f/u, rec pelvic x-ray to complete imaging and likely will not need further eval. Pelvic xray showing Intact and unremarkable partially visualized upper end of previously seen left femoral IM keiry/nail with proximal compression screw.  Redemonstrated displaced lesser trochanter avulsion fracture fragment. Remainder of fracture appears anatomically aligned on this study. No dislocations or acute appearing fractures in remaining imaged regions.  Intact pelvic and obturator rings and symmetrically aligned and spaced SI joints pubic symphysis. Preserved bilateral hip joint spaces and no gross radiographic evidence for AVN.   No discrete lytic or blastic lesions.   Pt to follow up with orthopedics as outpatient in 2-3 weeks. Discussed with Orthopedics PA, who recommended pt continue with  BID until outpatient follow up with Dr. Stafford in 2-3 weeks.     Leg swelling.   LLE swelling > RLE. Likely in setting of recent surgery, noted last admission. B/L LE U/S negative for DVT 7/22. States swelling has improved from prior.    Anemia.   Likely ERICK given low heard sat 11% however some component AOCD, suspect some anemia 2/2 post-op loses. 10.3 on last admit downtrended to 7.7 zach now uptrending. PO iron. OP f/u for routine screening including colonoscopy.    Paranoid schizophrenia.   Seems disorganized and tangential, stopped meds last admit 2/2 refusal. Psych f/u as unclear if able to care for self given delusions however defer to their assessment. Psych recommending ------      On 8/18/2022, this case was reviewed with Dr. Malagon, the patient is medically stable and optimized for discharge. All medications were reviewed and prescriptions were sent to mutually agreed upon pharmacy.

## 2022-08-10 NOTE — BH CONSULTATION LIAISON PROGRESS NOTE - NSBHCHARTREVIEWVS_PSY_A_CORE FT
Vital Signs Last 24 Hrs  T(C): 36.9 (10 Aug 2022 13:49), Max: 37.3 (09 Aug 2022 17:30)  T(F): 98.5 (10 Aug 2022 13:49), Max: 99.1 (09 Aug 2022 17:30)  HR: 64 (10 Aug 2022 13:49) (64 - 82)  BP: 138/72 (10 Aug 2022 13:49) (110/64 - 138/72)  BP(mean): --  RR: 16 (10 Aug 2022 13:49) (16 - 18)  SpO2: 100% (10 Aug 2022 13:49) (100% - 100%)    Parameters below as of 10 Aug 2022 13:49  Patient On (Oxygen Delivery Method): room air

## 2022-08-10 NOTE — BH CONSULTATION LIAISON PROGRESS NOTE - NSBHFUPINTERVALHXFT_PSY_A_CORE
Pt seen this PM. Pt found sitting up in bed. Pt AxOx3. Pt calm and cooperative. When interviewer mentioned he was from psychiatry the pt began telling story about how his psych admission in 2008 was a mistake and he was a research pt. Pt states his mood is OK. States he was wrongly discharged from hospital on his previous admission and he would like his leg to get better. Pt states his appetite is well. Pt states it is sometimes hard to sleep due to the noise in the hospital but overall he is sleeping well. Pt states he feels safe and is happy with treatment he is getting. Pt is very overinclusive and perseverative. Pt talks at length about how he works for [employer], and other [employer]. He also talks at length about being a plaintiff at [facility] court against his mother. Pt denies being on any psychiatric medications in the past. States he has only taken vitamins. Denies AH/VH/SI/HI.     No PRNs needed.

## 2022-08-11 LAB
BASOPHILS # BLD AUTO: 0.03 K/UL — SIGNIFICANT CHANGE UP (ref 0–0.2)
BASOPHILS NFR BLD AUTO: 0.8 % — SIGNIFICANT CHANGE UP (ref 0–2)
EOSINOPHIL # BLD AUTO: 0.19 K/UL — SIGNIFICANT CHANGE UP (ref 0–0.5)
EOSINOPHIL NFR BLD AUTO: 5.3 % — SIGNIFICANT CHANGE UP (ref 0–6)
HCT VFR BLD CALC: 32.7 % — LOW (ref 39–50)
HGB BLD-MCNC: 10.1 G/DL — LOW (ref 13–17)
IANC: 1.42 K/UL — LOW (ref 1.8–7.4)
IMM GRANULOCYTES NFR BLD AUTO: 0.3 % — SIGNIFICANT CHANGE UP (ref 0–1.5)
LYMPHOCYTES # BLD AUTO: 1.46 K/UL — SIGNIFICANT CHANGE UP (ref 1–3.3)
LYMPHOCYTES # BLD AUTO: 40.8 % — SIGNIFICANT CHANGE UP (ref 13–44)
MCHC RBC-ENTMCNC: 26.5 PG — LOW (ref 27–34)
MCHC RBC-ENTMCNC: 30.9 GM/DL — LOW (ref 32–36)
MCV RBC AUTO: 85.8 FL — SIGNIFICANT CHANGE UP (ref 80–100)
MONOCYTES # BLD AUTO: 0.47 K/UL — SIGNIFICANT CHANGE UP (ref 0–0.9)
MONOCYTES NFR BLD AUTO: 13.1 % — SIGNIFICANT CHANGE UP (ref 2–14)
NEUTROPHILS # BLD AUTO: 1.42 K/UL — LOW (ref 1.8–7.4)
NEUTROPHILS NFR BLD AUTO: 39.7 % — LOW (ref 43–77)
NRBC # BLD: 0 /100 WBCS — SIGNIFICANT CHANGE UP
NRBC # FLD: 0 K/UL — SIGNIFICANT CHANGE UP
PLATELET # BLD AUTO: 219 K/UL — SIGNIFICANT CHANGE UP (ref 150–400)
RBC # BLD: 3.81 M/UL — LOW (ref 4.2–5.8)
RBC # FLD: 15.8 % — HIGH (ref 10.3–14.5)
WBC # BLD: 3.58 K/UL — LOW (ref 3.8–10.5)
WBC # FLD AUTO: 3.58 K/UL — LOW (ref 3.8–10.5)

## 2022-08-11 PROCEDURE — 99232 SBSQ HOSP IP/OBS MODERATE 35: CPT

## 2022-08-11 RX ADMIN — Medication 325 MILLIGRAM(S): at 13:19

## 2022-08-11 RX ADMIN — Medication 650 MILLIGRAM(S): at 05:04

## 2022-08-11 RX ADMIN — Medication 650 MILLIGRAM(S): at 13:18

## 2022-08-11 RX ADMIN — ENOXAPARIN SODIUM 40 MILLIGRAM(S): 100 INJECTION SUBCUTANEOUS at 21:59

## 2022-08-11 RX ADMIN — Medication 650 MILLIGRAM(S): at 21:59

## 2022-08-11 NOTE — PROVIDER CONTACT NOTE (OTHER) - ASSESSMENT
Pt Lucie x4. patient refusing risperiDONE. [facility] in [place], that he has an order to not take the medication and that he refuses the medicine. As per the psych note. pt restarted on the medicine but is able to refuse the medicine

## 2022-08-11 NOTE — PROGRESS NOTE ADULT - PROBLEM SELECTOR PLAN 4
Seems disorganized and tangential, stopped meds last admit 2/2 refusal  - appreciate psych f/u  - on risperidone, per psych can refuse, intermittent compliance

## 2022-08-11 NOTE — PROGRESS NOTE ADULT - PROBLEM SELECTOR PLAN 5
Lovenox ppx, asa 325 BID on dc (7/17 x 6 wks)  regular diet  PT rec OP PT  dispo planning, need CM/SW re: safe dispo as readmitted same day as dc  dispo time 33 min

## 2022-08-11 NOTE — PROGRESS NOTE ADULT - PROBLEM SELECTOR PLAN 1
Pt s/p left IMN 7/17/22 w/ ortho and d/sona 8/8/22 to hotel. Per documentation pt was cleared by PT for home. Pt however returns to ED requesting help with rehab or hotel placement.  - c/w pain control w/ Tylenol and NSAIDs prn  - hip x-ray unremarkable/unchanged  - seems to be ambulating w/o difficulty  - PT rec OP PT  - appreciate ortho f/u, OP f/u with Dr. Stafford 2-3 wks  - c/w asa 325 on dc until 8/28

## 2022-08-11 NOTE — PROGRESS NOTE ADULT - SUBJECTIVE AND OBJECTIVE BOX
Patient is a 56y old  Male who presents with a chief complaint of leg pain, placement    SUBJECTIVE / OVERNIGHT EVENTS:    No CP, SOB, f/c/n/v   mild leg pain, reports working w/ PT    MEDICATIONS  (STANDING):  acetaminophen     Tablet .. 650 milliGRAM(s) Oral every 8 hours  ascorbic acid 500 milliGRAM(s) Oral daily  enoxaparin Injectable 40 milliGRAM(s) SubCutaneous every 24 hours  ferrous    sulfate 325 milliGRAM(s) Oral daily  risperiDONE   Tablet 1 milliGRAM(s) Oral at bedtime    MEDICATIONS  (PRN):  acetaminophen     Tablet .. 650 milliGRAM(s) Oral every 6 hours PRN Temp greater or equal to 38C (100.4F), Mild Pain (1 - 3)  ibuprofen  Tablet. 400 milliGRAM(s) Oral every 6 hours PRN Moderate Pain (4 - 6), Severe Pain (7 - 10)    T(C): 37.1 (08-11-22 @ 05:00), Max: 37.1 (08-11-22 @ 05:00)  HR: 88 (08-11-22 @ 05:00) (64 - 88)  BP: 132/77 (08-11-22 @ 05:00) (124/68 - 138/72)  RR: 18 (08-11-22 @ 05:00) (16 - 18)  SpO2: 99% (08-11-22 @ 05:00) (98% - 100%)    PHYSICAL EXAM:  GENERAL: NAD, well-developed  CHEST/LUNG: Clear to auscultation bilaterally; No wheeze  HEART: Regular rate and rhythm; No murmurs, rubs, or gallops  ABDOMEN: Soft, Nontender, Nondistended; Bowel sounds present  EXTREMITIES:   LLE edema, 3 L incisions c/d/i  PSYCH: disorganized  NEUROLOGY: non-focal, sitting and standing, walking without assistance  SKIN: R knee abrasion, superfical, healing, no s/s of infection       LABS:                        10.1   3.58  )-----------( 219      ( 11 Aug 2022 06:03 )             32.7     08-10    141  |  108<H>  |  16  ----------------------------<  95  3.8   |  24  |  0.73    Ca    9.0      10 Aug 2022 06:12  Phos  3.5     08-10  Mg     1.90     08-10    TPro  6.6  /  Alb  3.7  /  TBili  0.6  /  DBili  x   /  AST  9   /  ALT  10  /  AlkPhos  146<H>  08-10    Consultant(s) Notes Reviewed:  ortho   Care Discussed with Consultants/Other Providers:

## 2022-08-11 NOTE — PROVIDER CONTACT NOTE (OTHER) - SITUATION
patient refusing risperiDONE. patient states [facility] in [place], that he has an order to not take the medication and that he refuses the med

## 2022-08-12 LAB — SARS-COV-2 RNA SPEC QL NAA+PROBE: SIGNIFICANT CHANGE UP

## 2022-08-12 PROCEDURE — 99232 SBSQ HOSP IP/OBS MODERATE 35: CPT

## 2022-08-12 RX ADMIN — Medication 400 MILLIGRAM(S): at 10:18

## 2022-08-12 RX ADMIN — Medication 325 MILLIGRAM(S): at 13:07

## 2022-08-12 RX ADMIN — Medication 500 MILLIGRAM(S): at 13:07

## 2022-08-12 RX ADMIN — Medication 650 MILLIGRAM(S): at 21:15

## 2022-08-12 RX ADMIN — Medication 650 MILLIGRAM(S): at 05:24

## 2022-08-12 RX ADMIN — Medication 650 MILLIGRAM(S): at 13:06

## 2022-08-12 RX ADMIN — ENOXAPARIN SODIUM 40 MILLIGRAM(S): 100 INJECTION SUBCUTANEOUS at 21:16

## 2022-08-12 RX ADMIN — Medication 400 MILLIGRAM(S): at 10:48

## 2022-08-12 NOTE — PROGRESS NOTE ADULT - SUBJECTIVE AND OBJECTIVE BOX
Patient is a 56y old  Male who presents with a chief complaint of leg pain, placement    SUBJECTIVE / OVERNIGHT EVENTS:    Doing well, no CP, SOB, f/cn/v  "I was doing calisthenics"    MEDICATIONS  (STANDING):  acetaminophen     Tablet .. 650 milliGRAM(s) Oral every 8 hours  ascorbic acid 500 milliGRAM(s) Oral daily  enoxaparin Injectable 40 milliGRAM(s) SubCutaneous every 24 hours  ferrous    sulfate 325 milliGRAM(s) Oral daily  risperiDONE   Tablet 1 milliGRAM(s) Oral at bedtime    MEDICATIONS  (PRN):  acetaminophen     Tablet .. 650 milliGRAM(s) Oral every 6 hours PRN Temp greater or equal to 38C (100.4F), Mild Pain (1 - 3)  ibuprofen  Tablet. 400 milliGRAM(s) Oral every 6 hours PRN Moderate Pain (4 - 6), Severe Pain (7 - 10)    T(C): 37 (08-12-22 @ 11:10), Max: 37.1 (08-11-22 @ 12:24)  HR: 75 (08-12-22 @ 11:10) (69 - 75)  BP: 114/60 (08-12-22 @ 11:10) (114/60 - 129/63)  RR: 18 (08-12-22 @ 11:10) (17 - 18)  SpO2: 100% (08-12-22 @ 11:10) (97% - 100%)    PHYSICAL EXAM:  GENERAL: NAD, well-developed  CHEST/LUNG: Clear to auscultation bilaterally; No wheeze  HEART: Regular rate and rhythm; No murmurs, rubs, or gallops  ABDOMEN: Soft, Nontender, Nondistended; Bowel sounds present  EXTREMITIES:   LLE edema, 3 L incisions c/d/i  PSYCH: disorganized  NEUROLOGY: non-focal, sitting and standing, walking without assistance  SKIN: R knee abrasion, superficial healing, no s/s of infection     LABS:                        10.1   3.58  )-----------( 219      ( 11 Aug 2022 06:03 )             32.7     Consultant(s) Notes Reviewed:    Care Discussed with Consultants/Other Providers:

## 2022-08-13 PROCEDURE — 99232 SBSQ HOSP IP/OBS MODERATE 35: CPT

## 2022-08-13 RX ADMIN — ENOXAPARIN SODIUM 40 MILLIGRAM(S): 100 INJECTION SUBCUTANEOUS at 21:17

## 2022-08-13 RX ADMIN — Medication 650 MILLIGRAM(S): at 21:17

## 2022-08-13 RX ADMIN — Medication 650 MILLIGRAM(S): at 13:54

## 2022-08-13 RX ADMIN — Medication 400 MILLIGRAM(S): at 10:28

## 2022-08-13 RX ADMIN — Medication 400 MILLIGRAM(S): at 11:08

## 2022-08-13 RX ADMIN — Medication 650 MILLIGRAM(S): at 05:34

## 2022-08-13 RX ADMIN — Medication 500 MILLIGRAM(S): at 10:28

## 2022-08-13 NOTE — PROGRESS NOTE ADULT - PROBLEM SELECTOR PLAN 5
Lovenox ppx, asa 325 BID on dc (7/17 x 6 wks)  regular diet  PT rec OP PT  dispo planning, pending LTC, need CM/SW re: safe dispo as readmitted same day as dc

## 2022-08-13 NOTE — PROGRESS NOTE ADULT - SUBJECTIVE AND OBJECTIVE BOX
Patient is a 56y old  Male who presents with a chief complaint of leg pain, placement    SUBJECTIVE / OVERNIGHT EVENTS:    Doing well, no CP, SOB, f/c/n/v  Doing my exercises per PT  states would like Motrin     MEDICATIONS  (STANDING):  acetaminophen     Tablet .. 650 milliGRAM(s) Oral every 8 hours  ascorbic acid 500 milliGRAM(s) Oral daily  enoxaparin Injectable 40 milliGRAM(s) SubCutaneous every 24 hours  ferrous    sulfate 325 milliGRAM(s) Oral daily  risperiDONE   Tablet 1 milliGRAM(s) Oral at bedtime    MEDICATIONS  (PRN):  acetaminophen     Tablet .. 650 milliGRAM(s) Oral every 6 hours PRN Temp greater or equal to 38C (100.4F), Mild Pain (1 - 3)  ibuprofen  Tablet. 400 milliGRAM(s) Oral every 6 hours PRN Moderate Pain (4 - 6), Severe Pain (7 - 10)    T(C): 36.8 (08-13-22 @ 05:32), Max: 37 (08-12-22 @ 11:10)  HR: 70 (08-13-22 @ 05:32) (70 - 75)  BP: 120/67 (08-13-22 @ 05:32) (114/60 - 128/60)  RR: 18 (08-13-22 @ 05:32) (17 - 18)  SpO2: 100% (08-13-22 @ 05:32) (100% - 100%)    PHYSICAL EXAM:  GENERAL: NAD, well-developed  CHEST/LUNG: Clear to auscultation bilaterally; No wheeze  HEART: Regular rate and rhythm; No murmurs, rubs, or gallops  ABDOMEN: Soft, Nontender, Nondistended; Bowel sounds present  EXTREMITIES:   LLE edema, 3 L incisions c/d/i  PSYCH: disorganized  NEUROLOGY: non-focal, sitting and standing, walking without assistance  SKIN: R knee abrasion, superficial healing, no s/s of infection     LABS: no new labs    Consultant(s) Notes Reviewed:    Care Discussed with Consultants/Other Providers:

## 2022-08-14 PROCEDURE — 99232 SBSQ HOSP IP/OBS MODERATE 35: CPT

## 2022-08-14 RX ADMIN — Medication 325 MILLIGRAM(S): at 13:35

## 2022-08-14 RX ADMIN — Medication 400 MILLIGRAM(S): at 11:40

## 2022-08-14 RX ADMIN — Medication 650 MILLIGRAM(S): at 21:30

## 2022-08-14 RX ADMIN — Medication 650 MILLIGRAM(S): at 12:43

## 2022-08-14 RX ADMIN — Medication 650 MILLIGRAM(S): at 05:34

## 2022-08-14 RX ADMIN — ENOXAPARIN SODIUM 40 MILLIGRAM(S): 100 INJECTION SUBCUTANEOUS at 21:30

## 2022-08-14 RX ADMIN — Medication 650 MILLIGRAM(S): at 13:36

## 2022-08-14 RX ADMIN — Medication 400 MILLIGRAM(S): at 10:54

## 2022-08-14 RX ADMIN — Medication 500 MILLIGRAM(S): at 12:43

## 2022-08-14 NOTE — PROGRESS NOTE ADULT - SUBJECTIVE AND OBJECTIVE BOX
Patient is a 56y old  Male who presents with a chief complaint of leg pain, placement    SUBJECTIVE / OVERNIGHT EVENTS:    No CP, SOB, f/c/n/v  States wearing ACE wrap on L upper ext, advised point was for him to wear on lower leg to help edema, states made him itchy    MEDICATIONS  (STANDING):  acetaminophen     Tablet .. 650 milliGRAM(s) Oral every 8 hours  ascorbic acid 500 milliGRAM(s) Oral daily  enoxaparin Injectable 40 milliGRAM(s) SubCutaneous every 24 hours  ferrous    sulfate 325 milliGRAM(s) Oral daily  risperiDONE   Tablet 1 milliGRAM(s) Oral at bedtime    MEDICATIONS  (PRN):  acetaminophen     Tablet .. 650 milliGRAM(s) Oral every 6 hours PRN Temp greater or equal to 38C (100.4F), Mild Pain (1 - 3)  ibuprofen  Tablet. 400 milliGRAM(s) Oral every 6 hours PRN Moderate Pain (4 - 6), Severe Pain (7 - 10)    T(C): 36.8 (08-14-22 @ 05:33), Max: 37.6 (08-13-22 @ 14:34)  HR: 64 (08-14-22 @ 05:33) (63 - 65)  BP: 123/71 (08-14-22 @ 05:33) (116/66 - 134/69)  RR: 18 (08-14-22 @ 05:33) (18 - 18)  SpO2: 100% (08-14-22 @ 05:33) (100% - 100%)    PHYSICAL EXAM:  GENERAL: NAD, well-developed  CHEST/LUNG: Clear to auscultation bilaterally; No wheeze  HEART: Regular rate and rhythm; No murmurs, rubs, or gallops  ABDOMEN: Soft, Nontender, Nondistended; Bowel sounds present  EXTREMITIES:   LLE edema, 3 L incisions c/d/i  PSYCH: disorganized  NEUROLOGY: non-focal, sitting and standing, walking without assistance  SKIN: R knee abrasion, superficial healing, no s/s of infection     LABS:  no new labs    Consultant(s) Notes Reviewed:    Care Discussed with Consultants/Other Providers:

## 2022-08-14 NOTE — PROGRESS NOTE ADULT - PROBLEM SELECTOR PLAN 2
LLE swelling > RLE. Likely in setting of recent surgery, noted last admission   - b/l LE U/S negative for DVT 7/26  - states swelling has improved from prior, advised to wear the compression stocking or ace wrap

## 2022-08-15 PROCEDURE — 99232 SBSQ HOSP IP/OBS MODERATE 35: CPT

## 2022-08-15 RX ADMIN — Medication 400 MILLIGRAM(S): at 10:50

## 2022-08-15 RX ADMIN — Medication 650 MILLIGRAM(S): at 21:06

## 2022-08-15 RX ADMIN — Medication 400 MILLIGRAM(S): at 10:14

## 2022-08-15 RX ADMIN — Medication 650 MILLIGRAM(S): at 05:30

## 2022-08-15 RX ADMIN — Medication 650 MILLIGRAM(S): at 13:16

## 2022-08-15 NOTE — PROGRESS NOTE ADULT - SUBJECTIVE AND OBJECTIVE BOX
Patient is a 56y old  Male who presents with a chief complaint of leg pain, placement (10 Aug 2022 16:36)      SUBJECTIVE / OVERNIGHT EVENTS:    MEDICATIONS  (STANDING):  acetaminophen     Tablet .. 650 milliGRAM(s) Oral every 8 hours  ascorbic acid 500 milliGRAM(s) Oral daily  enoxaparin Injectable 40 milliGRAM(s) SubCutaneous every 24 hours  ferrous    sulfate 325 milliGRAM(s) Oral daily  risperiDONE   Tablet 1 milliGRAM(s) Oral at bedtime    MEDICATIONS  (PRN):  acetaminophen     Tablet .. 650 milliGRAM(s) Oral every 6 hours PRN Temp greater or equal to 38C (100.4F), Mild Pain (1 - 3)  ibuprofen  Tablet. 400 milliGRAM(s) Oral every 6 hours PRN Moderate Pain (4 - 6), Severe Pain (7 - 10)      Vital Signs Last 24 Hrs  T(C): 36.9 (15 Aug 2022 05:30), Max: 36.9 (15 Aug 2022 05:30)  T(F): 98.4 (15 Aug 2022 05:30), Max: 98.4 (15 Aug 2022 05:30)  HR: 63 (15 Aug 2022 05:30) (63 - 69)  BP: 131/75 (15 Aug 2022 05:30) (124/70 - 141/72)  BP(mean): --  RR: 18 (15 Aug 2022 05:30) (18 - 18)  SpO2: 100% (15 Aug 2022 05:30) (99% - 100%)    Parameters below as of 15 Aug 2022 05:30  Patient On (Oxygen Delivery Method): room air      CAPILLARY BLOOD GLUCOSE        I&O's Summary      PHYSICAL EXAM:  GENERAL: NAD, well-developed  HEAD:  Atraumatic, Normocephalic  EYES: EOMI, PERRLA, conjunctiva and sclera clear  NECK: Supple, No JVD  CHEST/LUNG: Clear to auscultation bilaterally; No wheeze  HEART: Regular rate and rhythm; No murmurs, rubs, or gallops  ABDOMEN: Soft, Nontender, Nondistended; Bowel sounds present  EXTREMITIES:  2+ Peripheral Pulses, No clubbing, cyanosis, or edema  PSYCH: AAOx3  NEUROLOGY: non-focal  SKIN: No rashes or lesions    LABS:                    RADIOLOGY & ADDITIONAL TESTS:    Imaging Personally Reviewed:    Consultant(s) Notes Reviewed:      Care Discussed with Consultants/Other Providers:   Patient is a 56y old  Male who presents with a chief complaint of leg pain, placement (10 Aug 2022 16:36)      SUBJECTIVE / OVERNIGHT EVENTS: patient seen and examined by bedside, pt c/o mild left hip pain, denies headache, dizziness, SOB, CP, Palpitations , N/V/D, abdominal pain      MEDICATIONS  (STANDING):  acetaminophen     Tablet .. 650 milliGRAM(s) Oral every 8 hours  ascorbic acid 500 milliGRAM(s) Oral daily  enoxaparin Injectable 40 milliGRAM(s) SubCutaneous every 24 hours  ferrous    sulfate 325 milliGRAM(s) Oral daily  risperiDONE   Tablet 1 milliGRAM(s) Oral at bedtime    MEDICATIONS  (PRN):  acetaminophen     Tablet .. 650 milliGRAM(s) Oral every 6 hours PRN Temp greater or equal to 38C (100.4F), Mild Pain (1 - 3)  ibuprofen  Tablet. 400 milliGRAM(s) Oral every 6 hours PRN Moderate Pain (4 - 6), Severe Pain (7 - 10)      Vital Signs Last 24 Hrs  T(C): 36.9 (15 Aug 2022 05:30), Max: 36.9 (15 Aug 2022 05:30)  T(F): 98.4 (15 Aug 2022 05:30), Max: 98.4 (15 Aug 2022 05:30)  HR: 63 (15 Aug 2022 05:30) (63 - 69)  BP: 131/75 (15 Aug 2022 05:30) (124/70 - 141/72)  BP(mean): --  RR: 18 (15 Aug 2022 05:30) (18 - 18)  SpO2: 100% (15 Aug 2022 05:30) (99% - 100%)    Parameters below as of 15 Aug 2022 05:30  Patient On (Oxygen Delivery Method): room air      CAPILLARY BLOOD GLUCOSE        I&O's Summary        PHYSICAL EXAM:  GENERAL: NAD, well-developed  CHEST/LUNG: Clear to auscultation bilaterally; No wheeze  HEART: Regular rate and rhythm; No murmurs, rubs, or gallops  ABDOMEN: Soft, Nontender, Nondistended; Bowel sounds present  EXTREMITIES:   LLE edema, 3 L incisions c/d/i  PSYCH: disorganized  NEUROLOGY: non-focal, sitting and standing, walking without assistance  SKIN: R knee abrasion, superficial healing, no s/s of infection       LABS:        no new labs             RADIOLOGY & ADDITIONAL TESTS:    Imaging Personally Reviewed:    Consultant(s) Notes Reviewed:      Care Discussed with Consultants/Other Providers:

## 2022-08-15 NOTE — PROGRESS NOTE ADULT - PROBLEM SELECTOR PLAN 1
Pt s/p left IMN 7/17/22 w/ ortho and d/sona 8/8/22 to hotel. Per documentation pt was cleared by PT for home. Pt however returns to ED requesting help with rehab or hotel placement.  - c/w pain control w/ Tylenol and NSAIDs prn  - hip x-ray unremarkable/unchanged  - seems to be ambulating w/o difficulty  - PT rec OP PT  - appreciate ortho f/u, OP f/u with Dr. Stafford 2-3 wks  - c/w asa 325 on dc until 8/28 Pt s/p left IMN 7/17/22 w/ ortho and d/sona 8/8/22 to hotel. Per documentation pt was cleared by PT for home. Pt however returns to ED requesting help with rehab or hotel placement.  - c/w pain control w/ Tylenol and NSAIDs prn  - hip x-ray unremarkable/unchanged  - seems to be ambulating with cane   - PT rec OP PT  - appreciate ortho f/u, OP f/u with Dr. Stafford 2-3 wks  - c/w asa 325 on dc until 8/28

## 2022-08-15 NOTE — DIETITIAN INITIAL EVALUATION ADULT - OTHER INFO
56 year old male with a PMH of schizophrenia and recent admission for left hip fx s/p IMN, discharged 8/8/22 presenting back to ED of same day for L hip pain and need for placement per chart.    Patient reports good appetite in house, noted with intakes % per RN flow sheet. Requesting for nutritional supplement. No GI distress or chewing/swallowing difficulties reported. Reports being lactose intolerant. Patient still unsure of UBW. Per previous RD note (7/25) noted with weight 76.7 kg. ABW is 71.1 kg (8/9) suggesting a -7.4% weight loss x 2 weeks, ?accuracy as it may be r/t scale error, recommend obtaining new weight to verification. Noted with +2 R ankle/foot and +3 L ankle/foot edema with no pressure injuries per RN flow sheet.

## 2022-08-15 NOTE — DIETITIAN INITIAL EVALUATION ADULT - ORAL INTAKE PTA/DIET HISTORY
Patient seen for assessment. Patient known to writer from previous admission, refer to RD note (7/25). Reports no issues with appetite or PO intake since last assessment.

## 2022-08-15 NOTE — PROGRESS NOTE ADULT - PROBLEM SELECTOR PLAN 5
Lovenox ppx, asa 325 BID on dc (7/17 x 6 wks)  regular diet  PT rec OP PT  dispo planning, pending LTC, need CM/SW re: safe dispo as readmitted same day as dc Lovenox ppx, asa 325 BID on dc (7/17 x 6 wks)  regular diet  PT rec OP PT  dispo planning, pending LTC, need CM/SW re: safe dispo as readmitted same day as dc  plan of care d/w pt, ACP and SW

## 2022-08-16 PROCEDURE — 99232 SBSQ HOSP IP/OBS MODERATE 35: CPT

## 2022-08-16 RX ADMIN — Medication 400 MILLIGRAM(S): at 10:28

## 2022-08-16 RX ADMIN — Medication 650 MILLIGRAM(S): at 13:14

## 2022-08-16 RX ADMIN — Medication 400 MILLIGRAM(S): at 11:20

## 2022-08-16 RX ADMIN — Medication 650 MILLIGRAM(S): at 21:31

## 2022-08-16 RX ADMIN — Medication 650 MILLIGRAM(S): at 05:23

## 2022-08-16 NOTE — PROGRESS NOTE ADULT - SUBJECTIVE AND OBJECTIVE BOX
Patient is a 56y old  Male who presents with a chief complaint of Pain of lower leg     (15 Aug 2022 12:46)      SUBJECTIVE / OVERNIGHT EVENTS:    MEDICATIONS  (STANDING):  acetaminophen     Tablet .. 650 milliGRAM(s) Oral every 8 hours  ascorbic acid 500 milliGRAM(s) Oral daily  enoxaparin Injectable 40 milliGRAM(s) SubCutaneous every 24 hours  ferrous    sulfate 325 milliGRAM(s) Oral daily  risperiDONE   Tablet 1 milliGRAM(s) Oral at bedtime    MEDICATIONS  (PRN):  acetaminophen     Tablet .. 650 milliGRAM(s) Oral every 6 hours PRN Temp greater or equal to 38C (100.4F), Mild Pain (1 - 3)  ibuprofen  Tablet. 400 milliGRAM(s) Oral every 6 hours PRN Moderate Pain (4 - 6), Severe Pain (7 - 10)      Vital Signs Last 24 Hrs  T(C): 36.8 (16 Aug 2022 05:20), Max: 37.1 (15 Aug 2022 21:06)  T(F): 98.2 (16 Aug 2022 05:20), Max: 98.8 (15 Aug 2022 21:06)  HR: 66 (16 Aug 2022 05:20) (61 - 81)  BP: 118/65 (16 Aug 2022 05:20) (118/60 - 129/72)  BP(mean): --  RR: 18 (16 Aug 2022 05:20) (18 - 19)  SpO2: 100% (16 Aug 2022 05:20) (100% - 100%)    Parameters below as of 16 Aug 2022 05:20  Patient On (Oxygen Delivery Method): room air      CAPILLARY BLOOD GLUCOSE        I&O's Summary      PHYSICAL EXAM:  GENERAL: NAD, well-developed  HEAD:  Atraumatic, Normocephalic  EYES: EOMI, PERRLA, conjunctiva and sclera clear  NECK: Supple, No JVD  CHEST/LUNG: Clear to auscultation bilaterally; No wheeze  HEART: Regular rate and rhythm; No murmurs, rubs, or gallops  ABDOMEN: Soft, Nontender, Nondistended; Bowel sounds present  EXTREMITIES:  2+ Peripheral Pulses, No clubbing, cyanosis, or edema  PSYCH: AAOx3  NEUROLOGY: non-focal  SKIN: No rashes or lesions    LABS:                    RADIOLOGY & ADDITIONAL TESTS:    Imaging Personally Reviewed:    Consultant(s) Notes Reviewed:      Care Discussed with Consultants/Other Providers:   Patient is a 56y old  Male who presents with a chief complaint of Pain of lower leg     (15 Aug 2022 12:46)      SUBJECTIVE / OVERNIGHT EVENTS: patient seen and examined by bedside, no acute complain ,       MEDICATIONS  (STANDING):  acetaminophen     Tablet .. 650 milliGRAM(s) Oral every 8 hours  ascorbic acid 500 milliGRAM(s) Oral daily  enoxaparin Injectable 40 milliGRAM(s) SubCutaneous every 24 hours  ferrous    sulfate 325 milliGRAM(s) Oral daily  risperiDONE   Tablet 1 milliGRAM(s) Oral at bedtime    MEDICATIONS  (PRN):  acetaminophen     Tablet .. 650 milliGRAM(s) Oral every 6 hours PRN Temp greater or equal to 38C (100.4F), Mild Pain (1 - 3)  ibuprofen  Tablet. 400 milliGRAM(s) Oral every 6 hours PRN Moderate Pain (4 - 6), Severe Pain (7 - 10)      Vital Signs Last 24 Hrs  T(C): 36.8 (16 Aug 2022 05:20), Max: 37.1 (15 Aug 2022 21:06)  T(F): 98.2 (16 Aug 2022 05:20), Max: 98.8 (15 Aug 2022 21:06)  HR: 66 (16 Aug 2022 05:20) (61 - 81)  BP: 118/65 (16 Aug 2022 05:20) (118/60 - 129/72)  BP(mean): --  RR: 18 (16 Aug 2022 05:20) (18 - 19)  SpO2: 100% (16 Aug 2022 05:20) (100% - 100%)    Parameters below as of 16 Aug 2022 05:20  Patient On (Oxygen Delivery Method): room air            PHYSICAL EXAM:  GENERAL: NAD, well-developed  CHEST/LUNG: Clear to auscultation bilaterally; No wheeze  HEART: Regular rate and rhythm; No murmurs, rubs, or gallops  ABDOMEN: Soft, Nontender, Nondistended; Bowel sounds present  EXTREMITIES:   LLE edema, 3 L incisions c/d/i  PSYCH: disorganized  NEUROLOGY: non-focal, sitting and standing, walking without assistance  SKIN: R knee abrasion, superficial healing, no s/s of infection       LABS:              no new labs       RADIOLOGY & ADDITIONAL TESTS:    Imaging Personally Reviewed:    Consultant(s) Notes Reviewed:      Care Discussed with Consultants/Other Providers:

## 2022-08-16 NOTE — PROGRESS NOTE ADULT - PROBLEM SELECTOR PLAN 5
Lovenox ppx, asa 325 BID on dc (7/17 x 6 wks)  regular diet  PT rec OP PT  dispo planning, pending LTC, need CM/SW re: safe dispo as readmitted same day as dc  plan of care d/w pt, ACP and SW

## 2022-08-16 NOTE — PROGRESS NOTE ADULT - PROBLEM SELECTOR PLAN 1
Pt s/p left IMN 7/17/22 w/ ortho and d/sona 8/8/22 to hotel. Per documentation pt was cleared by PT for home. Pt however returns to ED requesting help with rehab or hotel placement.  - c/w pain control w/ Tylenol and NSAIDs prn  - hip x-ray unremarkable/unchanged  - seems to be ambulating with cane   - PT rec OP PT  - appreciate ortho f/u, OP f/u with Dr. Stafford 2-3 wks  - c/w asa 325 on dc until 8/28

## 2022-08-17 LAB — SARS-COV-2 RNA SPEC QL NAA+PROBE: SIGNIFICANT CHANGE UP

## 2022-08-17 PROCEDURE — 99232 SBSQ HOSP IP/OBS MODERATE 35: CPT

## 2022-08-17 RX ADMIN — Medication 650 MILLIGRAM(S): at 21:48

## 2022-08-17 RX ADMIN — Medication 650 MILLIGRAM(S): at 13:33

## 2022-08-17 RX ADMIN — Medication 400 MILLIGRAM(S): at 12:00

## 2022-08-17 RX ADMIN — Medication 650 MILLIGRAM(S): at 05:15

## 2022-08-17 RX ADMIN — Medication 400 MILLIGRAM(S): at 11:00

## 2022-08-17 NOTE — PROGRESS NOTE ADULT - PROBLEM SELECTOR PLAN 5
Lovenox ppx, asa 325 BID on dc (7/17 x 6 wks)  regular diet  PT rec OP PT  dispo planning, pending LTC, need CM/SW re: safe dispo as readmitted same day as dc  plan of care d/w pt, ACP and SW Lovenox ppx, asa 325 BID on dc (7/17 x 6 wks)  regular diet  PT rec OP PT  dispo planning, pending LTC,   d/w SW , pt interviewed by an accepting facility on 8/16   plan of care d/w pt, ACP and SW

## 2022-08-17 NOTE — PROGRESS NOTE ADULT - SUBJECTIVE AND OBJECTIVE BOX
Patient is a 56y old  Male who presents with a chief complaint of leg pain, placement (16 Aug 2022 09:58)      SUBJECTIVE / OVERNIGHT EVENTS:    MEDICATIONS  (STANDING):  acetaminophen     Tablet .. 650 milliGRAM(s) Oral every 8 hours  ascorbic acid 500 milliGRAM(s) Oral daily  enoxaparin Injectable 40 milliGRAM(s) SubCutaneous every 24 hours  ferrous    sulfate 325 milliGRAM(s) Oral daily  risperiDONE   Tablet 1 milliGRAM(s) Oral at bedtime    MEDICATIONS  (PRN):  acetaminophen     Tablet .. 650 milliGRAM(s) Oral every 6 hours PRN Temp greater or equal to 38C (100.4F), Mild Pain (1 - 3)  ibuprofen  Tablet. 400 milliGRAM(s) Oral every 6 hours PRN Moderate Pain (4 - 6), Severe Pain (7 - 10)      Vital Signs Last 24 Hrs  T(C): 36.8 (17 Aug 2022 05:15), Max: 36.8 (17 Aug 2022 05:15)  T(F): 98.2 (17 Aug 2022 05:15), Max: 98.2 (17 Aug 2022 05:15)  HR: 69 (17 Aug 2022 05:15) (68 - 73)  BP: 109/68 (17 Aug 2022 05:15) (109/68 - 121/69)  BP(mean): --  RR: 18 (17 Aug 2022 05:15) (17 - 18)  SpO2: 100% (17 Aug 2022 05:15) (100% - 100%)    Parameters below as of 17 Aug 2022 05:15  Patient On (Oxygen Delivery Method): room air      CAPILLARY BLOOD GLUCOSE        I&O's Summary      PHYSICAL EXAM:  GENERAL: NAD, well-developed  HEAD:  Atraumatic, Normocephalic  EYES: EOMI, PERRLA, conjunctiva and sclera clear  NECK: Supple, No JVD  CHEST/LUNG: Clear to auscultation bilaterally; No wheeze  HEART: Regular rate and rhythm; No murmurs, rubs, or gallops  ABDOMEN: Soft, Nontender, Nondistended; Bowel sounds present  EXTREMITIES:  2+ Peripheral Pulses, No clubbing, cyanosis, or edema  PSYCH: AAOx3  NEUROLOGY: non-focal  SKIN: No rashes or lesions    LABS:                    RADIOLOGY & ADDITIONAL TESTS:    Imaging Personally Reviewed:    Consultant(s) Notes Reviewed:      Care Discussed with Consultants/Other Providers:   Patient is a 56y old  Male who presents with a chief complaint of leg pain, placement (16 Aug 2022 09:58)      SUBJECTIVE / OVERNIGHT EVENTS: patient seen and examined by bedside, pt reports mild difficulty lifting his left leg , ambulating with cane , denies headache, dizziness, SOB, CP, Palpitations , N/V/D, abdominal pain      MEDICATIONS  (STANDING):  acetaminophen     Tablet .. 650 milliGRAM(s) Oral every 8 hours  ascorbic acid 500 milliGRAM(s) Oral daily  enoxaparin Injectable 40 milliGRAM(s) SubCutaneous every 24 hours  ferrous    sulfate 325 milliGRAM(s) Oral daily  risperiDONE   Tablet 1 milliGRAM(s) Oral at bedtime    MEDICATIONS  (PRN):  acetaminophen     Tablet .. 650 milliGRAM(s) Oral every 6 hours PRN Temp greater or equal to 38C (100.4F), Mild Pain (1 - 3)  ibuprofen  Tablet. 400 milliGRAM(s) Oral every 6 hours PRN Moderate Pain (4 - 6), Severe Pain (7 - 10)      Vital Signs Last 24 Hrs  T(C): 36.8 (17 Aug 2022 05:15), Max: 36.8 (17 Aug 2022 05:15)  T(F): 98.2 (17 Aug 2022 05:15), Max: 98.2 (17 Aug 2022 05:15)  HR: 69 (17 Aug 2022 05:15) (68 - 73)  BP: 109/68 (17 Aug 2022 05:15) (109/68 - 121/69)  BP(mean): --  RR: 18 (17 Aug 2022 05:15) (17 - 18)  SpO2: 100% (17 Aug 2022 05:15) (100% - 100%)    Parameters below as of 17 Aug 2022 05:15  Patient On (Oxygen Delivery Method): room air          PHYSICAL EXAM:  GENERAL: NAD, well-developed  CHEST/LUNG: Clear to auscultation bilaterally; No wheeze  HEART: Regular rate and rhythm; No murmurs, rubs, or gallops  ABDOMEN: Soft, Nontender, Nondistended; Bowel sounds present  EXTREMITIES:   LLE edema, 3 L incisions c/d/i  PSYCH: disorganized  NEUROLOGY: non-focal, sitting and standing, walking without assistance  SKIN: R knee abrasion, superficial healing, no s/s of infection       LABS:            no new labs         RADIOLOGY & ADDITIONAL TESTS:    Imaging Personally Reviewed:    Consultant(s) Notes Reviewed:      Care Discussed with Consultants/Other Providers:

## 2022-08-18 ENCOUNTER — TRANSCRIPTION ENCOUNTER (OUTPATIENT)
Age: 57
End: 2022-08-18

## 2022-08-18 VITALS
SYSTOLIC BLOOD PRESSURE: 105 MMHG | RESPIRATION RATE: 18 BRPM | DIASTOLIC BLOOD PRESSURE: 59 MMHG | TEMPERATURE: 98 F | HEART RATE: 89 BPM | OXYGEN SATURATION: 100 %

## 2022-08-18 PROCEDURE — 99239 HOSP IP/OBS DSCHRG MGMT >30: CPT

## 2022-08-18 RX ORDER — FERROUS SULFATE 325(65) MG
1 TABLET ORAL
Qty: 0 | Refills: 0 | DISCHARGE
Start: 2022-08-18

## 2022-08-18 RX ORDER — ACETAMINOPHEN 500 MG
2 TABLET ORAL
Qty: 0 | Refills: 0 | DISCHARGE
Start: 2022-08-18

## 2022-08-18 RX ORDER — RISPERIDONE 4 MG/1
1 TABLET ORAL
Qty: 0 | Refills: 0 | DISCHARGE
Start: 2022-08-18

## 2022-08-18 RX ORDER — ASPIRIN/CALCIUM CARB/MAGNESIUM 324 MG
1 TABLET ORAL
Qty: 60 | Refills: 0
Start: 2022-08-18 | End: 2022-09-16

## 2022-08-18 RX ORDER — ASCORBIC ACID 60 MG
1 TABLET,CHEWABLE ORAL
Qty: 0 | Refills: 0 | DISCHARGE
Start: 2022-08-18

## 2022-08-18 RX ORDER — IBUPROFEN 200 MG
1 TABLET ORAL
Qty: 0 | Refills: 0 | DISCHARGE
Start: 2022-08-18

## 2022-08-18 RX ADMIN — Medication 650 MILLIGRAM(S): at 05:07

## 2022-08-18 RX ADMIN — Medication 650 MILLIGRAM(S): at 11:33

## 2022-08-18 NOTE — PROGRESS NOTE ADULT - PROBLEM SELECTOR PROBLEM 4
Paranoid schizophrenia

## 2022-08-18 NOTE — PROGRESS NOTE ADULT - PROBLEM SELECTOR PROBLEM 2
Leg swelling

## 2022-08-18 NOTE — CHART NOTE - NSCHARTNOTEFT_GEN_A_CORE
Plan for discharge today, discussed with Orthopedics PA, who recommended pt continue with  BID until outpatient follow up with Dr. Stafford in 2-3 weeks.     Yoko De Leon PA-C  Department of Medicine  Pager [phone]

## 2022-08-18 NOTE — PROGRESS NOTE ADULT - PROBLEM SELECTOR PROBLEM 1
Fracture of left hip

## 2022-08-18 NOTE — PROGRESS NOTE ADULT - PROBLEM SELECTOR PLAN 5
Lovenox ppx, asa 325 BID on dc (7/17 x 6 wks)  regular diet  PT rec OP PT  dispo planning, pending LTC,   d/w SW , pt interviewed by an accepting facility on 8/16   plan of care d/w pt, ACP and SW Lovenox ppx, asa 325 BID on dc (7/17 x 6 wks)  regular diet  PT rec OP PT  dispo ; DC to SNF today  Patient hemodynamically stable for discharge  Time spent in discharge process is 40 min  plan of care d/w pt, ACP and SW

## 2022-08-18 NOTE — DISCHARGE NOTE NURSING/CASE MANAGEMENT/SOCIAL WORK - NSDCPEFALRISK_GEN_ALL_CORE
For information on Fall & Injury Prevention, visit: [email or web address] OR  [email or web address] OR  https://www.cdc.gov/steadi/patient.html

## 2022-08-18 NOTE — PROGRESS NOTE ADULT - PROBLEM SELECTOR PLAN 1
Pt s/p left IMN 7/17/22 w/ ortho and d/sona 8/8/22 to hotel. Per documentation pt was cleared by PT for home. Pt however returns to ED requesting help with rehab or hotel placement.  - c/w pain control w/ Tylenol and NSAIDs prn  - hip x-ray unremarkable/unchanged  - seems to be ambulating with cane   - PT rec OP PT  - appreciate ortho f/u, OP f/u with Dr. Stafford 2-3 wks  - c/w asa 325 on dc until 8/28 Pt s/p left IMN 7/17/22 w/ ortho and d/sona 8/8/22 to hotel. Per documentation pt was cleared by PT for home. Pt however returns to ED requesting help with rehab or hotel placement.  - c/w pain control w/ Tylenol and NSAIDs prn  - hip x-ray unremarkable/unchanged  - seems to be ambulating with cane   - PT rec OP PT  - appreciate ortho f/u, OP f/u with Dr. Stafford 2-3 wks  - c/w asa 325 on dc until outpt f/u with ortho

## 2022-08-18 NOTE — PROGRESS NOTE ADULT - SUBJECTIVE AND OBJECTIVE BOX
Patient is a 56y old  Male who presents with a chief complaint of leg pain, placement (17 Aug 2022 09:45)      SUBJECTIVE / OVERNIGHT EVENTS:    MEDICATIONS  (STANDING):  acetaminophen     Tablet .. 650 milliGRAM(s) Oral every 8 hours  ascorbic acid 500 milliGRAM(s) Oral daily  enoxaparin Injectable 40 milliGRAM(s) SubCutaneous every 24 hours  ferrous    sulfate 325 milliGRAM(s) Oral daily  risperiDONE   Tablet 1 milliGRAM(s) Oral at bedtime    MEDICATIONS  (PRN):  acetaminophen     Tablet .. 650 milliGRAM(s) Oral every 6 hours PRN Temp greater or equal to 38C (100.4F), Mild Pain (1 - 3)  ibuprofen  Tablet. 400 milliGRAM(s) Oral every 6 hours PRN Moderate Pain (4 - 6), Severe Pain (7 - 10)      Vital Signs Last 24 Hrs  T(C): 36.8 (18 Aug 2022 05:07), Max: 36.8 (18 Aug 2022 05:07)  T(F): 98.2 (18 Aug 2022 05:07), Max: 98.2 (18 Aug 2022 05:07)  HR: 60 (18 Aug 2022 05:07) (60 - 71)  BP: 121/64 (18 Aug 2022 05:07) (111/55 - 126/67)  BP(mean): --  RR: 18 (18 Aug 2022 05:07) (18 - 18)  SpO2: 100% (18 Aug 2022 05:07) (100% - 100%)    Parameters below as of 18 Aug 2022 05:07  Patient On (Oxygen Delivery Method): room air      CAPILLARY BLOOD GLUCOSE        I&O's Summary      PHYSICAL EXAM:  GENERAL: NAD, well-developed  HEAD:  Atraumatic, Normocephalic  EYES: EOMI, PERRLA, conjunctiva and sclera clear  NECK: Supple, No JVD  CHEST/LUNG: Clear to auscultation bilaterally; No wheeze  HEART: Regular rate and rhythm; No murmurs, rubs, or gallops  ABDOMEN: Soft, Nontender, Nondistended; Bowel sounds present  EXTREMITIES:  2+ Peripheral Pulses, No clubbing, cyanosis, or edema  PSYCH: AAOx3  NEUROLOGY: non-focal  SKIN: No rashes or lesions    LABS:                    RADIOLOGY & ADDITIONAL TESTS:    Imaging Personally Reviewed:    Consultant(s) Notes Reviewed:      Care Discussed with Consultants/Other Providers:   Patient is a 56y old  Male who presents with a chief complaint of leg pain, placement (17 Aug 2022 09:45)      SUBJECTIVE / OVERNIGHT EVENTS: patient seen and examined by bedside, pt denies headache, dizziness, SOB, CP, Palpitations , N/V/D, abdominal pain      MEDICATIONS  (STANDING):  acetaminophen     Tablet .. 650 milliGRAM(s) Oral every 8 hours  ascorbic acid 500 milliGRAM(s) Oral daily  enoxaparin Injectable 40 milliGRAM(s) SubCutaneous every 24 hours  ferrous    sulfate 325 milliGRAM(s) Oral daily  risperiDONE   Tablet 1 milliGRAM(s) Oral at bedtime    MEDICATIONS  (PRN):  acetaminophen     Tablet .. 650 milliGRAM(s) Oral every 6 hours PRN Temp greater or equal to 38C (100.4F), Mild Pain (1 - 3)  ibuprofen  Tablet. 400 milliGRAM(s) Oral every 6 hours PRN Moderate Pain (4 - 6), Severe Pain (7 - 10)      Vital Signs Last 24 Hrs  T(C): 36.8 (18 Aug 2022 05:07), Max: 36.8 (18 Aug 2022 05:07)  T(F): 98.2 (18 Aug 2022 05:07), Max: 98.2 (18 Aug 2022 05:07)  HR: 60 (18 Aug 2022 05:07) (60 - 71)  BP: 121/64 (18 Aug 2022 05:07) (111/55 - 126/67)  BP(mean): --  RR: 18 (18 Aug 2022 05:07) (18 - 18)  SpO2: 100% (18 Aug 2022 05:07) (100% - 100%)    Parameters below as of 18 Aug 2022 05:07  Patient On (Oxygen Delivery Method): room air              PHYSICAL EXAM:  GENERAL: NAD, well-developed  CHEST/LUNG: Clear to auscultation bilaterally; No wheeze  HEART: Regular rate and rhythm; No murmurs, rubs, or gallops  ABDOMEN: Soft, Nontender, Nondistended; Bowel sounds present  EXTREMITIES:   LLE edema, 3 L incisions c/d/i  PSYCH: disorganized  NEUROLOGY: non-focal, sitting and standing, walking without assistance  SKIN: R knee abrasion, superficial healing, no s/s of infection         LABS:          no new labs           RADIOLOGY & ADDITIONAL TESTS:    Imaging Personally Reviewed:    Consultant(s) Notes Reviewed:      Care Discussed with Consultants/Other Providers:

## 2022-08-18 NOTE — PROGRESS NOTE ADULT - PROBLEM SELECTOR PLAN 3
Likely ERICK given low heard sat 11% however some component AOCD, suspect some anemia 2/2 post-op loses. 10.3 on last admit downtrended to 7.7 zach now uptrending  - PO iron  - OP f/u for routine screening including colonoscopy
No

## 2023-04-24 NOTE — ED PROVIDER NOTE - CONSTITUTIONAL, MLM
none normal... Well appearing, awake, alert, oriented to person, place, time/situation and in no apparent distress.

## 2024-11-12 NOTE — BH CONSULTATION LIAISON PROGRESS NOTE - NSBHATTESTTYPEVISIT_PSY_A_CORE
98.3
Resident/Fellow with telephonic supervision
On-site Attending with Resident/Fellow/Student and JERMAN (99XXX codes)
On-site Attending with Resident/Fellow/Student and JERMAN (99XXX codes)
Attending with Resident/Fellow/Student

## 2025-02-24 NOTE — DISCHARGE NOTE PROVIDER - REASON FOR ADMISSION
CARDIOVASCULAR TRANSFER OF CARE/CONSULTATION    Date of admission: 2/23/2025  9:07 AM  Date of consultation: 02/24/25    Primary Cardiologist: Dr. 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 Pt states she and her  came down with a viral illness in the past 1 week. Pt states her SOB did not improve after DC and now worsening with recent illness. Denies orthopnea. Denies leg swelling.     In ED: Slightly hypotensive. Increased RR and tachycardic up to 100 BPM.Troponin was 271>314>292, BNP 4897 (improved from 7K), Procalcitonin 0.43. WBC 17>12.6, Blood cultures pending, C. Diff positive. Viral panel negative,  CXR showed Cardiomegaly and mild congestive changes, similar to 1/20/2025. Pt was given zithromax and ceftriaxone.       PAST HISTORIES      Past Medical History:   Diagnosis Date    ACL tear 12/30/2009    S/P REPAIR    Breast cancer  (CMD) 10/2006    Cardiac arrest (CMD) 03/01/2012    Cardiomyopathy (CMD)     CHF (congestive heart failure), NYHA Class2, systolic 03/01/2012    Churg-Tori syndrome  (CMD)     Congestive Heart Failure     Due to Cytoxin    Degeneration of cervical intervertebral disc 12/01/2007    Degeneration of C4-C6    Degeneration of cervical intervertebral disc 12/01/2007    C4-C6    Degeneration of cervical intervertebral disc 12/01/2007    C4-C6    Degeneration of cervical intervertebral disc     Degeneration of cervical intervertebral disc 12/2077    C4-C6    Depression     Difficult intubation     Dysphagia 12/01/2011    Hypercholesterolemia     Hypertension     ICD- [place] Single Chamber on 02/17/2012     LV  dysfunction     Malignant neoplasm  (CMD)     Nasal polyps     Nonischemic dilated cardiomyopathy  (CMD)     Other chronic pain 12/01/2007    Disc degeneration C4-C6    Personal history of radiation therapy 10/2006    Rt breast    Pneumonia     RAD (reactive airway disease) (CMD)     Radiation 10/01/2006    For Breast CA (Right)    ST elevation myocardial infarction (STEMI), unspecified artery  (CMD) 01/19/2025    Thyroid condition        Past Surgical History:   Procedure Laterality Date    Breast lumpectomy  10/01/2006    Right Breast    Egd dilation of duod w/baloon  12/01/2011    Electrocardiogram complete  02/17/2010    Ep icd implant  02/17/2012    Successful; [place] Single Chamber    Eye service or procedure  01/01/1979    Eye Realignment    Eye surgery      Knee arthroplasty      Nasal polyp surgery      Bilateral OR    Ptca      Service to gastroenterology      Skin biopsy      Speech therapy ip  01/01/2009    Study done and nothing found    Tonsillectomy and adenoidectomy      Tracheostomy tube  07/01/2010    Xr chest ap or pa  02/16/2012    Xr chest ap or pa  02/15/2012    Xr chest ap or pa  02/15/2012    Xr chest ap or pa  02/14/2012    Xr chest ap or pa  02/13/2012    Xr chest pa and lateral  02/18/2012    Xray esophagus single contrast study  12/01/2011       ALLERGIES:   Allergen Reactions    Methylprednisolone CARDIAC DISTURBANCES     Tachycardia    Prednisone CARDIAC DISTURBANCES     Tachycardia - patient takes this medication though    Amoxicillin      HTN    Ampicillin Other (See Comments)     HTN    Latex   (Environmental) RASH     Allergic to adhesives    Percocet [Oxycodone-Acetaminophen] Other (See Comments)     Mouth ulcer    Statins RASH    Sulfa Antibiotics RASH       Social History     Tobacco Use    Smoking status: Former     Current packs/day: 0.00     Average packs/day: 0.5 packs/day for 20.0 years (10.0 ttl pk-yrs)     Types: Cigarettes     Start date: 2/12/1985     Quit date:  2005     Years since quittin.0    Smokeless tobacco: Never   Substance Use Topics    Alcohol use: No     Alcohol/week: 0.0 standard drinks of alcohol    Drug use: No        Family History   Problem Relation Age of Onset    Osteoarthritis Mother     Heart disease Mother     Cancer Mother     Cancer Father     Heart disease Father     Bilateral breast cancer Half-Sister 50 - 59    Dementia/Alzheimers Half-Sister     Cancer, Breast Paternal Aunt         age?       CURRENT MEDICATIONS:  Scheduled:   Current Facility-Administered Medications   Medication Dose Route Frequency Provider Last Rate Last Admin    ondansetron (ZOFRAN) injection 4 mg  4 mg Intravenous Once Nohemi Felipe MD        cefTRIAXone (ROCEPHIN) 2,000 mg in sterile water (preservative free) IV syringe  2,000 mg Intravenous Daily Nohemi Felipe MD        apixaBAN (ELIQUIS) tablet 5 mg  5 mg Oral 2 times per day Javed Crespo MD   5 mg at 25 2159    aspirin chewable 81 mg  81 mg Oral Daily Javed Crespo MD        [Held by provider] lisinopril (ZESTRIL) tablet 5 mg  5 mg Oral Daily Javed Crespo MD        pantoprazole (PROTONIX) EC tablet 40 mg  40 mg Oral QAM AC Javed Crespo MD   40 mg at 25 0622    budesonide-formoterol (SYMBICORT) 160-4.5 MCG/ACT inhaler 2 puff  2 puff Inhalation BID Resp Javed Crespo MD   2 puff at 25 0836    [Held by provider] carvedilol (COREG) tablet 3.125 mg  3.125 mg Oral BID WC Javed Crespo MD        levothyroxine (SYNTHROID, LEVOTHROID) tablet 100 mcg  100 mcg Oral Daily Javed Crespo MD   100 mcg at 25 0622    predniSONE (DELTASONE) tablet 5 mg  5 mg Oral Daily Javed Crespo MD        [Held by provider] spironolactone (ALDACTONE) tablet 25 mg  25 mg Oral Daily Javed Crespo MD        azithromycin (ZITHROMAX) 500 mg in sodium chloride 0.9 % 250 mL IVPB  500 mg Intravenous Daily Javed Crespo MD        sodium chloride 0.9 %  injection 2 mL  2 mL Intracatheter 2 times per day Javed Crespo MD   2 mL at 02/23/25 2013    Potassium Standard Replacement Protocol (Levels 3.5 and lower)   Does not apply See Admin Instructions Javed Crespo MD        Magnesium Standard Replacement Protocol   Does not apply See Admin Instructions Javed Crespo MD        guaiFENesin (MUCINEX) ER tablet 1,200 mg  1,200 mg Oral 2 times per day Javed Crespo MD   1,200 mg at 02/23/25 2200    furosemide (LASIX) tablet 20 mg  20 mg Oral Q Evening Javed Crespo MD   20 mg at 02/23/25 2009    hydroxychloroquine (PLAQUENIL) tablet 200 mg  200 mg Oral Once per day on Sunday Monday Tuesday Wednesday Thursday Saturday Javed Crespo MD        hydroxychloroquine (PLAQUENIL) tablet 200 mg  200 mg Oral Once per day on Sunday Monday Tuesday Wednesday Thursday Saturday Javed Crespo MD   200 mg at 02/23/25 2224    magnesium sulfate 2 g in 50 mL premix IVPB  2 g Intravenous Once Javed Crespo MD        metoPROLOL succinate (TOPROL-XL) ER tablet 25 mg  25 mg Oral BID Javed Crespo MD   25 mg at 02/23/25 2012       Continuous Infusions:   Current Facility-Administered Medications   Medication Dose Route Frequency Provider Last Rate Last Admin       REVIEW OF SYSTEMS        10 systems reviewed and otherwise negative except for what is stated in past medical history and HPI (history of present illness).'    PHYSICAL EXAMINATION     Vital Last Value 24 Hour Range   Temperature 98.6 °F (37 °C) (02/23/25 1945) Temp  Min: 97.9 °F (36.6 °C)  Max: 99 °F (37.2 °C)   Pulse (!) 56 (02/24/25 0520) Pulse  Min: 56  Max: 101   Respiratory 16 (02/24/25 0520) Resp  Min: 16  Max: 25   Non-invasive  blood pressure 113/75 (02/24/25 0520) BP  Min: 94/63  Max: 113/75   Arterial  blood pressure   No data recorded   Pulse oximetry 94 % (02/24/25 0520) SpO2  Min: 92 %  Max: 96 %     Vital Today Admission   Weight 76.2 kg (167 lb 15.9 oz) (02/24/25 0520)  Weight: 79.6 kg (175 lb 7.8 oz) (02/23/25 0909)     Weight over the past 48 Hours:  Patient Vitals for the past 48 hrs:   Weight   02/23/25 0909 79.6 kg (175 lb 7.8 oz)   02/23/25 1617 76.8 kg (169 lb 5 oz)   02/24/25 0520 76.2 kg (167 lb 15.9 oz)        Hemodynamics:   Last Value 24 Hour Range   CVP   No data recorded   PAS/PAD   No data recorded   PCWP   No data recorded   CO   No data recorded   CI   No data recorded   SV02   No data recorded     Intake/Output:    Intake/Output Summary (Last 24 hours) at 2/24/2025 0949  Last data filed at 2/23/2025 1700  Gross per 24 hour   Intake 400 ml   Output --   Net 400 ml       TELEMETRY (personal interpretation):SR    CONSTITUTIONAL: Comfortable, no acute distress.  HEENT: Normocephalic, conjunctivae not pale, no scleral icterus. Moist mucous membranes. Pupils symmetrical.  NECK: No jugular venous distention, trachea midline. No carotid bruits bilaterally.  RESP: Symmetrical lung expansions. Normal respiratory effort. Lungs rales b/l to bases.   CARDIOVASCULAR: S1 and S2 normal intensity, regular rate and rhythm. No murmur/rubs. No edema in lower extremities   Pulses symmetrical 2+ in radial;Good capillary refill bilateral fingers and toes.    ABDOMEN: Soft, nontender. Normoactive bowel sounds. Aorta not palpable.  EXTREMITIES: No limb atrophy. Warm, well perfused all 4 extremities.  NEUROLOGIC: Alert and oriented x3. Moves all 4 extremities equally.  SKIN: Not diaphoretic, warm. No bruising, no new rash.    RESULTS REVIEWED     Recent Labs   Lab 02/24/25  0254 02/23/25  0924 02/23/25  0922   WBC 12.6*  --  17.0*   HCT 39.5  --  41.7   HGB 12.0  --  12.8     --  287   INR  --  1.2  --    PTT  --  29  --    SODIUM 138  --  136   POTASSIUM 3.9  --  3.7   CHLORIDE 106  --  103   CO2 25  --  26   GLUCOSE 89  --  115*   BUN 14  --  12   CREATININE 0.92  --  0.96*       ECG (personal interpretation): ST with PVCs     Imaging:  ECHO 1/20/25  Final Impressions    *  Moderately enlarged left ventricular chamber size. Moderate globally  reduced systolic function, EF 31%, GLS -10%.    * Grade III diastolic dysfunction of the left ventricle (restrictive filling  pattern).    * Normal RV chamber size and systolic function. Moderately increased RVSP 50  mmHg.    * Thickened, tethered mitral valve leaflets. Moderate regurgitation, PISA RV  45 mL, ERO 0.3 cm2.    * Small hemodynamically insignificant pericardial effusion.    * No recent study available for comparison    ECHO 5/2024 ([facility])  Left ventricle is severely enlarged in size. There is moderate left   ventricular systolic dysfunction. The quantitative LVEF based on modified Jaquez's method is 36%.   Global hypokinesis is present with some regional variation.   There is mild (grade I) diastolic dysfunction with normal left atrial   pressure.   Right ventricle is normal in size. There is normal right ventricular systolic   function.   The estimated pulmonary artery systolic pressure is within normal range.   A small circumferential pericardial effusion appears present     [facility] 1/19/25  Assessment:  - Angiographically normal coronary arteries  - Global hypokinesia on LV gram - EF around 30%     CORONARY ANGIOGRAM:  1. Left main is large and angiographically normal. Gives off the LAD and Lcx.   2. LAD is a type III vessel, large and gives off multiple septals and diagonals, before it wraps around the apex. Angiographically normal.  3. Lcx is a normal caliber vessel and gives off a large sized OM1. There are no luminal irregularities. Angiographically normal.  4. RCA is large and dominant which gives off the PDA and PL branch, no mild luminal irregularities. Angiographically normal.     Plan:  - Heparin to resume after 6 hours  - Ep consult for uncontrolled afib. Cont amio.  - Device check   - GDMT - will attempt to start entresto tomorrow. Hold lisinopril   - Patient to return to floor bed  - Bedrest for 6 hours  - Monitor for access  site complications.    ASSESSMENT AND PLAN   Chronic HFrEF  Diastolic dysfunction  Nonischemic cardiomyopathy - chemo induced? S/p remote ICD   - pBNP 4.8K on admit (was 7.1K last month). CXR showing stable congestive changes. Wt 167lbs compared to 171 lbs last month  -  EF 31%, stable. Grade III diastolic dysfunction, worsening since 2024  - GDMT with BB, Aldactone and ACE (on hold) and Jardiance. Entresto cost prohibitive.   - Continue low dose Lasix. Close monitoring to volume status as she is C Diff +   - Cardiac diet / I & Os / daily wts     Normal coronaries 1/19/25     Paroxysmal A. Fib  Recent A. Fib with RVR admission 1/2025  - Now in SR with frequent PVCs/bigeminy   - TSH stable   - Eliquis for AC. Rate controlled with BB. Will need to monitor rates closely off BB as she is hypotensive    Atpical PNA  Leukocytosis   Fevers  - On IV antbx.     Hx of tobacco abuse, quit 20 years ago, smoked for 21 years  Chronic cough      PLAN  - NO HF seen on admission. Pending labs tomorrow, may need to stop Lasix. Close monitoring of volume status. Restart BB when BP allows.     SUSAN Moeller      Physician Note   I saw and examined the patient. The patients symptoms, physical findings, and studies, are as documented above by the Advanced Practice Clinician. I have reviewed and edited the note.  I discussed the patients treatment plans with the patient and family.  Data Reviewed by me included I personally reviewed the following diagnostic studies Cardiac Catheterization, Echocardiogram, and ECG, CXR .    Portions of the History and Physical Examination were performed by the Advanced Practice Clinician and portions of the History and Physical Examination and complete [facility] Medical Decision Making were done by me.    My assessment and plan are as documented above by the Advanced Practice Clinician, and I have reviewed and edited the above note as needed.    Stable chronic HFrEF, nonischemic cardiomyopathy, h/o  paroxysmal AF, now in NSR.  No overt HF on exam.  Current presentation compatible with URI and gastroenteritis.  Back off diuretics due to diarrhoea to avoid dehydration. Cont other HF meds. Monitor volume status and electrolytes.    Don Craig MD     leg pain, placement

## 2025-04-25 NOTE — ED ADULT NURSE NOTE - NSFALLRSKASSISTTYPE_ED_ALL_ED
Patient: Altaf Parekh Date: 2025   : 1960    64 year old male      OUTPATIENT WOUND CARE PROGRESS NOTE    Supervising Wound Care / Hyperbaric Medicine Physician: Not Applicable  Consulting Provider:  Iliana Mathew NP  Date of Consultation/Last Comprehensive Exam:  3/17/25  Referring  Provider:  Jillian Werner NP     SUBJECTIVE:    Chief Complaint:  Right medial ankle wound      Wound/Ulcer Present:    Venous leg ulcer:  Current Vascular Assessment:  Physical exam.  Current Venous Type:  Venous insufficiency ulcer, lower extremity RLE    Has the patient received adequate compression therapy for equal to or greater than 4 weeks?  No  Additional Wound Category:  None  Maximum Baseline Ambulatory Status:  Ambulates unassisted    History of Present Illness:  This is a 64 year old male with past medical history significant for hypertension, hyperlipidemia, PE/DVT on chronic anticoagulation and history of IVC filter since removed , chronic back pain with prior lumbar laminectomies, depression.     He presented to [facility] ER on 3/17/25 with RLE redness, swelling, warmth, pain and worsening of right medial ankle wound. He notes the wound started ~1 month ago. His PCP prescribed topical mupirocin and he felt the wound was improving. He then noted scabs coming off and more drainage and felt the wound was enlarging. He does not currently wear compression. His PCP has ordered him stockings but they havent been delivered. He denies fevers, chills, nausea or vomiting. He is on Eliquis prior to admission for AC. Currently on a heparin drip. Denies smoking or DM history.       At inpatient consult 3/17, during inpatient stay 3/17 - 3/19 for right lower extremity swelling & pain, right medial ankle ulcer treated with mupirocin/Adaptic/gauze/Kerlix/low-compression TG; had seen  Dr. Capps, who planned venous insufficiency study. He received course of IV antibiotics inpatient and was discharged on 7 days of  doxycycline which he has completed.     Interval history (4/25/2025):      Last visit 4/11; infection appeared improved but not resolved with topical mupirocin; repeat cx sent; mupirocin/Iodoflex/Mepitel/Domepaste/cotton/Coban 3x weekly in clinic      Current Treatment Regimen:  Dressing:   as above         Review of Systems:  Pertinent items are noted in HPI (history of present illness).    Past Medical History:   Diagnosis Date    Anemia     Blood clot associated with vein wall inflammation 2006    Bilateral legs    Blood clot in the legs     bilateral    Cardiac failure congestive     Chronic kidney disease     ckd 3    Depression     DJD (degenerative joint disease) of lumbar spine     with chronic back pain    Esophageal reflux     Fungal esophagitis     Glaucoma     High cholesterol     Hypertension     Low iron     Other chronic pain     PE (pulmonary embolism)     \"massive\" bilateral    Presence of IVC filter     S/P robotic jen fundoplication 1/30/2019    s/p robotic heller myotomy  1/30/2019    Sleep apnea     STOP USING 12/2018    Wears prescription eyeglasses      Past Surgical History:   Procedure Laterality Date    Anes laparoscopy myotomy heller  01/30/2019    Robotic Heller Myotomy w/ JEN fundoplication by Dr. Vaughn at [facility]    Back surgery  03/07/2017    L4-L5 yolande laminotomies w/ bilateral partial medial facetectomies by Dr. Staples at [facility]    Colonoscopy  03/02/2017    Past surgical history      fliters in the gorin area for blood clots    Removal gallbladder      Tonsillectomy  1964     Social History     Tobacco Use    Smoking status: Never     Passive exposure: Never    Smokeless tobacco: Never   Substance Use Topics    Alcohol use: No     Alcohol/week: 0.0 standard drinks of alcohol        Family History   Problem Relation Age of Onset    Cancer Mother     Cancer Father     Diabetes Son     Diabetes Daughter        Current Outpatient Medications   Medication Sig    doxycycline monohydrate  (MONODOX) 100 MG capsule Take 1 capsule by mouth in the morning and 1 capsule in the evening. Do all this for 10 days.    promethazine (PHENERGAN) 12.5 MG tablet Take 1 tablet by mouth 3 times daily as needed.    budesonide-formoterol (Symbicort) 160-4.5 MCG/ACT inhaler Inhale 2 puffs into the lungs in the morning and 2 puffs in the evening.    tiotropium (Spiriva HandiHaler) 18 MCG capsule for inhaler Place 1 capsule into inhaler and inhale daily.    promethazine (PHENERGAN) 6.25 MG/5ML syrup Take 5 mLs by mouth 3 times daily as needed.    Vitamin D, Ergocalciferol, 72356 units Cap Take 1 capsule by mouth every 7 days. Sunday    mupirocin (BACTROBAN) 2 % ointment Apply 1 Application topically in the morning and 1 Application at noon and 1 Application in the evening.    apixaBAN (ELIQUIS) 5 MG Tab Take 1 tablet by mouth every 12 hours.    Vonoprazan Fumarate (Voquezna) 20 MG Tab Take 1 tablet by mouth daily.    albuterol (ProAir HFA) 108 (90 Base) MCG/ACT inhaler Inhale 2 puffs into the lungs every 4 hours as needed for Shortness of Breath or Wheezing.    naLOXone (NARCAN) 4 MG/0.1ML nasal liquid Spray the content of 1 device into 1 nostril. Call 911. May repeat with 2nd device in alternate nostril if no response in 2-3 minutes.    rabeprazole (ACIPHEX) 20 MG tablet Take 1 tablet by mouth in the morning and 1 tablet in the evening.    gabapentin (NEURONTIN) 600 MG tablet Take 1 tablet by mouth in the morning and 1 tablet in the evening.    allopurinol (ZYLOPRIM) 100 MG tablet Take 1 tablet every day by oral route.    cetirizine (ZyrTEC) 10 MG tablet Take 10 mg by mouth daily.    amLODIPine (NORVASC) 5 MG tablet Take 1 tablet by mouth daily.    lisinopril (ZESTRIL) 40 MG tablet Take 1 tablet by mouth daily.     No current facility-administered medications for this encounter.        ALLERGIES:  Patient has no known allergies.    OBJECTIVE:  Vital Signs:  Visit Vitals  BP (!) 142/72 (BP Location: RUE - Right upper  extremity, Patient Position: Sitting)   Pulse (!) 60   Temp 96.3 °F (35.7 °C) (Temporal)   Resp 18           Physical Exam:  General appearance: Appears stated age, Alert, in no distress, and cooperative  Head:   normocephalic without obvious abnormality  Pulmonary: normal respiratory effort    RLE with varicosities & gaiter-distribution hemosiderin/melanin deposition   RDP 2+    Right medial ankle wound full-thickness but shallow, slightly smaller than prior   base is granulation with minimal slough,   Periwound crusting   base and periwound, especially posterior to ulcer, improved    periwound edema and erythema improved     No progressive-appearing necrosis, epidermolysis, exposed bone, crepitance, fluctuance, pus, pyocyanin, malodor, or lymphangitis          04/25/2025 07:14     Prior images:    R ankle   04/11/2025 07:30           R medial ankle   03/28/2025 08:02       Wound image 3/17/25:  Legs overview                                                    R medial ankle          Wound Bed Quality:   See Above       Wound Measurements Per Flowsheet:       Wound Ankle Left Inferior/Lower;Interior/Inner Non-pressure Injury (Active)   Number of days: 39       Wound Ankle Right Medial Venous Ulcer (Active)   Wound Length (cm) 4.5 cm 04/25/25 0714   Wound Width (cm) 2.1 cm 04/25/25 0714   Wound Depth (cm) 0.2 cm 04/25/25 0714   Wound Surface Area (cm^2) 9.45 cm^2 04/25/25 0714   Wound Volume (cm^3) 1.89 cm^3 04/25/25 0714   Number of days: 39         PROCEDURE:  None performed  Procedure was Performed by:  Not applicable    Laboratory results:    No results found for: \"PAB\"      Albumin (g/dL)   Date Value   03/17/2025 3.4   08/05/2024 3.6   12/27/2023 3.3 (L)      Absolute Lymph (K/mcL)   Date Value   08/03/2011 1.2     Absolute Lymphocytes (K/mcL)   Date Value   03/17/2025 1.3   02/12/2025 1.3   08/05/2024 1.7      No results found for: \"ZINC\"   No components found for: \"VITAC\"   B12 (pg/mL)   Date  Value   12/30/2019 322        No results found for: \"TSH\" No results found for: \"T4FREE\"          No results available in last 24 hours   Lab Results   Component Value Date    GLUCOSE 95 03/19/2025    HGBA1C 5.4 12/30/2019    HGBA1C 5.6 12/03/2019      Lab Results   Component Value Date    WBC 4.7 03/19/2025    CRP <3.0 08/05/2024    RESR 7 08/05/2024    CREATININE 0.88 03/19/2025    GFRESTIMATE >90 03/19/2025        Infection/inflammation lab results summary:   Recent Labs   Lab 03/19/25  0433 03/18/25  0511 03/17/25  1151 03/17/25  0819 02/12/25  0805 08/05/24  0844 05/28/24  0926   Absolute Neutrophils  --   --   --  2.6 2.3 1.5* 1.8   WBC 4.7 4.2 5.3 4.5 4.0* 3.6* 4.6      Recent Labs   Lab 03/17/25  0825 03/17/25  0819 08/05/24  0844   C-Reactive Protein  --   --  <3.0   RBC Sedimentation Rate  --   --  7   Procalcitonin  --  <0.05  --    VLA 0.7  --   --          Culture results:  Culture, Blood or Bone Marrow (no units)   Date Value   03/17/2025 No Growth 5 Days.     Specimen Description (no units)   Date Value   02/24/2019 URINE, CLEAN CATCH/MIDSTREAM   02/24/2019 BLOOD, PERIPHERAL ARM, LEFT   02/24/2019 BLOOD, PERIPHERAL ARM, RIGHT   01/17/2019 URINE, CLEAN CATCH/MIDSTREAM     Gram Stain (no units)   Date Value   04/11/2025 No polymorphonuclear cells seen.   04/11/2025 No epithelial cells seen.   04/11/2025 Rare Gram positive cocci   03/28/2025 No polymorphonuclear cells seen.   03/28/2025 No epithelial cells seen.   03/28/2025 Few Gram positive bacilli.   03/28/2025 Rare Gram positive cocci     CULTURE WITH GRAM STAIN, AEROBIC (no units)   Date Value   04/11/2025 Few Staphylococcus aureus, methicillin resistant (A)   04/11/2025 Few Mixed aerobic ana   03/28/2025 Few Mixed aerobic naa     CULTURE (no units)   Date Value   02/24/2019 NO GROWTH.   02/24/2019 NO GROWTH 5 DAYS.   02/24/2019 NO GROWTH 5 DAYS.   01/17/2019 NO GROWTH.           Culture results:   Susceptibility data from last 90 days.  Filters  applied: Specimen Type.  Collected Specimen Info Organism CLINDAMYCIN DAPTOMYCIN ERYTHROMYCIN GENTAMICIN LINEZOLID OXACILLIN RIFAMPIN TETRACYCLINE TRIMETHOPRIM/SULFAMETHOXAZOLE VANCOMYCIN   04/11/25 Wound from Ankle, Right Staphylococcus aureus, methicillin resistant  R  S  R  S  S  R  S  R  R  S     Mixed aerobic ana             03/28/25 Wound from Leg, Right Mixed aerobic ana                 Diagnostic Assessments Reviewed:    CT venogram 3/17/25: IMPRESSION:  1.  Likely chronic filling defects within the right femoral vein and common femoral vein as described.   2.  Mild in-stent narrowing within a right external to common iliac venous stent. IVC stent is widely patent.  3.  Possible filling defect within the left femoral vein. Correlation with left lower extremity venous Doppler may be helpful if clinically indicated.  4.  Additional findings as above      RLE Venous duplex 3/17/25: IMPRESSION:  Similar chronic post thrombotic changes within the right common femoral, proximal and mid femoral veins, as well as the popliteal and gastrocnemius veins.      LOWER ARTERIAL DUPLEX ULTRASOUND 1/3/2017:   Exam Conclusions  There is no prior study immediately available for comparison.  The right BECKI performed today was elevated at 1.61.  The left BECKI performed today was elevated at 1.39.  Normal infrainguinal right lower extremity arterial system without evidence  of significant stenosis.  Normal infrainguinal left lower extremity arterial system without evidence of significant stenosis.  Diffuse vessel calcification was noted throughout the bilateral lower extremity arterial systems.  Interpretation Data  Interrogation of the right lower extremity arterial system revealed normal velocities without overt evidence of stenosis.  Interrogation of the left lower extremity arterial system revealed normal velocities without overt evidence of stenosis.  Diffuse vessel calcification was noted throughout the bilateral lower  extremity arterial systems.      Nutritional Assessment:  Prealbumin and/or Albumin reviewed    Wound treatment goals are palliative:  No    DIAGNOSES:  Cellulitis RLE  Venous insufficiency ulcer, chronic, lower extremity RLE    Context issues: H/O DVT  Edema    Medical Decision Making:   Patient with RLE wound in setting of H/O DVT. Not currently wearing compression.    Ongoing concern for right leg cellulitis.     4/25/2025: Not much improvement with separate courses of doxycycline x 7d & Augmentin x 10d  - check Cx (see below)  - change to topical mupirocin/Iodoflex/Adaptic    Local Wound Care:  RLE-  Change 3x weekly in wound clinic.   Wash with mild soap and water, rinse, pat dry  Apply mupirocin to open areas   Cover with Iodoflex, then Mepitel  Cover with absorbent dressing appropriate to amount of drainage   wrap with Domepaste/cotton/Coban       Vascular:  Arterial: DP pulse is palpable. Appears to have adequate local circulation for healing; evaluated and followed by Vascular Surgery Dr. Capps     Venous: On Eliquis for DVT  venous insufficiency study ordered by Dr. Capps, scheduled for 5/1/25    Edema: Elevate legs for now, compression as above; consider advance to Profore based on response       Infectious Disease:  No antibiotic allergies; eGFR >90; not on warfarin (on Eliquis)  - RLE appears to have mild recurrent/persistent cellulitis  - completed 7 day course of doxycycline & 10-days of Augmentin 875/125 without much improvement     4/11/2025:   Cx from 3/28 \"mixed aerobic ana\"; smear showed Gm-pos cocci & Gm-pos bacilli   - add mupirocin back to topical care with consideration for prior \"sticking\"   - check culture again;   - ID/susc if coag-neg Staph or diphtheroid (suggested by prior smear)   - requested susc doxy & amadeo if TCN resistant  - consider ABX based on result:   - especially for organism not covered by Augmentin or doxycycline   - may need longer course     4/25/2025:   Cx 4/11 grew  MRSA among the mixed ana  resistant to TCN but doxy & amadeo not tested;   improving with topical mupirocin but   prior PO doxy not effective  Rx minocycline 100 mg PO BID x 10 days      Nutrition/ Diabetes:  - Encourage high protein diet for wound healing.    - Registered dietician following, appreciate recommendations.    - Patient denies DM or thyroid disease        Follow up:    3x weekly dressing changes in wound clinic  provider in 2-3  weeks, earlier PRN        Plan of Care:  Advanced Wound Care Recommendations:  See Above  Percent Wound Closure from consult:  N/A, consult 3/17/25  Care plan to augment wound closure: Wound closure greater than or equal to 30% at four weeks.  SOC 3/17/25        Giancarlo Bazzi MD [facility]:  [phone]   Hyperbaric Medicine & Wound Care [facility]:  [phone]         Standing/Walking/Toileting

## 2025-05-20 NOTE — BH CONSULTATION LIAISON ASSESSMENT NOTE - PREPARATORY ACTS:
Were seen here today for headaches, dizziness, and changes in your left eye.  While you noted that these have been occurring for over a year, your cardiac enzymes were elevated which is concerning for potential transient ischemic attack versus presyncope versus cardiac arrhythmia/cardiac dysfunction.  Please note that we did recommend admission for further cardiac and presyncope workup.  However you preferred to be discharged at this time.  We discussed risks and benefits, and he voiced understanding, agreeable to follow-up with your primary care physician at [facility].  If anything should change, please return to the emergency department either here or at [facility] for further workup.    Please return to the emergency department, should you have any worsening symptoms, are unable to get an appointment with your primary care physician and/or specialty provider within the discussed time frame, or have any further concerns.     Please follow up with: Primary care physician in the next few days.  Please also discuss follow-up with neurology/cardiology for outpatient syncope/cardiac workup.    
None known

## 2025-05-21 NOTE — ED PROVIDER NOTE - OBJECTIVE STATEMENT
Patient ID:  Karan Melgoza is a 46 year old male.    Chief Complaint   Patient presents with   • Office Visit   • Physical       HPI  Pt here for yearly check up  Feeling well  Non-smoker    Exercises occasionally  No chest pains or sob  No abd pains or urinary symptoms.     Review of Systems   Constitutional:  Negative for chills and fever.   HENT:  Negative for congestion.    Eyes:  Negative for visual disturbance.   Respiratory:  Negative for cough, shortness of breath and wheezing.    Cardiovascular:  Negative for chest pain and palpitations.   Gastrointestinal:  Negative for abdominal pain, constipation, diarrhea and nausea.   Genitourinary:  Negative for dysuria, frequency, hematuria, testicular pain and urgency.   Musculoskeletal:  Negative for myalgias.   Skin:  Negative for rash.   Allergic/Immunologic: Negative for immunocompromised state.   Neurological:  Negative for dizziness, light-headedness and headaches.   Hematological:  Negative for adenopathy. Does not bruise/bleed easily.       ALLERGIES:  No Known Allergies    Past Medical History:   Diagnosis Date   • Essential (primary) hypertension        Past Surgical History:   Procedure Laterality Date   • No past surgeries         Family History   Problem Relation Age of Onset   • Hypertension Mother    • Patient is unaware of any medical problems Father    • Patient is unaware of any medical problems Sister    • Patient is unaware of any medical problems Brother        Social History     Socioeconomic History   • Marital status: Single     Spouse name: Not on file   • Number of children: Not on file   • Years of education: Not on file   • Highest education level: Not on file   Occupational History   • Not on file   Tobacco Use   • Smoking status: Never   • Smokeless tobacco: Never   Substance and Sexual Activity   • Alcohol use: Yes     Alcohol/week: 3.0 standard drinks of alcohol     Types: 3 Cans of beer per week     Comment: occ   • Drug use: Never    • Sexual activity: Not on file   Other Topics Concern   • Not on file   Social History Narrative   • Not on file     Social Drivers of Health     Financial Resource Strain: Not on file   Food Insecurity: Patient Declined (5/19/2025)    Food Insecurity    • Worried about Food: Patient declined    • Food is Gone: Patient declined   Transportation Needs: Not At Risk (5/19/2025)    Transportation Needs    • Lack of Reliable Transportation: No   Physical Activity: Not on file   Stress: Not on file   Social Connections: Not on file   Feeling safe in your relationship: Not on file       Current Outpatient Medications   Medication Sig Dispense Refill   • lisinopril-hydroCHLOROthiazide (ZESTORETIC) 10-12.5 MG per tablet Take 1 tablet by mouth daily. 90 tablet 1     No current facility-administered medications for this visit.       Visit Vitals  /78   Pulse 75   Temp 96.5 °F (35.8 °C)   Ht 5' 9\" (1.753 m)   Wt 96.3 kg (212 lb 3.2 oz)   SpO2 98%   BMI 31.34 kg/m²       Physical Exam  Constitutional:       General: He is not in acute distress.     Appearance: Normal appearance.   HENT:      Right Ear: Tympanic membrane, ear canal and external ear normal.      Left Ear: Tympanic membrane, ear canal and external ear normal.      Mouth/Throat:      Pharynx: No oropharyngeal exudate or posterior oropharyngeal erythema.      Neck: No rigidity or tenderness.   Eyes:      Extraocular Movements: Extraocular movements intact.      Conjunctiva/sclera: Conjunctivae normal.      Pupils: Pupils are equal, round, and reactive to light.   Cardiovascular:      Rate and Rhythm: Normal rate and regular rhythm.      Pulses: Normal pulses.      Heart sounds: Normal heart sounds. No murmur heard.  Pulmonary:      Effort: Pulmonary effort is normal.      Breath sounds: Normal breath sounds. No wheezing, rhonchi or rales.   Abdominal:      General: Abdomen is flat. Bowel sounds are normal. There is no distension.      Palpations: Abdomen is  soft. There is no mass.      Tenderness: There is no abdominal tenderness. There is no right CVA tenderness, left CVA tenderness, guarding or rebound.   Musculoskeletal:      Right lower leg: No edema.      Left lower leg: No edema.   Lymphadenopathy:      Cervical: No cervical adenopathy.   Skin:     General: Skin is warm and dry.      Findings: No lesion or rash.   Neurological:      General: No focal deficit present.      Mental Status: He is alert and oriented to person, place, and time.      Gait: Gait normal.      Deep Tendon Reflexes: Reflexes normal.   Psychiatric:         Mood and Affect: Mood normal.         Behavior: Behavior normal.          Assessment/Plan:      Problem List Items Addressed This Visit        Cardiac and Vasculature    Primary hypertension    Relevant Medications    lisinopril-hydroCHLOROthiazide (ZESTORETIC) 10-12.5 MG per tablet    Other Relevant Orders    CBC with Automated Differential    Comprehensive Metabolic Panel    Lipid Panel With Reflex    Urinalysis & Reflex Microscopy With Culture If Indicated   Other Visit Diagnoses       Annual physical exam    -  Primary    Relevant Orders    CBC with Automated Differential    Comprehensive Metabolic Panel    Lipid Panel With Reflex    Urinalysis & Reflex Microscopy With Culture If Indicated      Immunity status testing        Relevant Orders    Hepatitis B Surface Antibody      Labs today  Stay active.     Schedule follow up: as needed        Elsy Anders CNP   per  note from recent admission, discharged today  "Patient is a 55 y/o M w PSH of tonsillectomy, no prevenlant PMH, PPHx of schizophrenia, presents with acute left hip fracture. Patient has a hx of inpatient psychiatric admissions, as per chart review patient with multiple inpatient admission, patient has 2 admission to [facility] in 2008 and 2015. It is noted that patient has a hx of being on haldol 5mg TID and cogentin, however currently on no medications. Patient has no known SA, no hx of violence noted in chart review, no reported substance abuse. Patient not in current psychiatric treatment. Psychiatry called for capacity as patient is refusing recommended surgical repair of hip fracture. "  pt ended up getting surgery and was in hospital approx 3 weeks; per patient SW would not allow him to go to home he has right to go to because mother did not want him back.  Mother is hard of hearing to point she can not hear phone ring so pt not able to contact her to understand issues.  Feels discharged "way too early" and not stable on his feet.  When discharged went to court to write up papers to state his legal right to house mother lives in.  States can not go to shelter as very gingerly walking.  per records appeared rehab had cleared for d/c.  pt states would even accept inpatient rehab.  No acute worsening.

## (undated) DEVICE — DRSG ACE BANDAGE 6"

## (undated) DEVICE — DRSG COBAN 4"

## (undated) DEVICE — DRILL BIT STRYKER ORTHO 4.2 X 340MM

## (undated) DEVICE — LIJ-ORTHO TRIANGLES: Type: DURABLE MEDICAL EQUIPMENT

## (undated) DEVICE — SUT VICRYL 2-0 27" FS-1 UNDYED

## (undated) DEVICE — DRSG WEBRIL 4"

## (undated) DEVICE — TOURNIQUET ESMARK 6"

## (undated) DEVICE — SUT VICRYL 0 27" OS-6 UNDYED

## (undated) DEVICE — ANESTHESIA CIRCUIT ADULT HMEF

## (undated) DEVICE — ELCTR GROUNDING PAD ADULT COVIDIEN

## (undated) DEVICE — SYR LUER LOK 20CC

## (undated) DEVICE — DRAPE IOBAN 33" X 23"

## (undated) DEVICE — PACK LIJ BASIC ORTHO

## (undated) DEVICE — SOL IRR BAG NS 0.9% 3000ML

## (undated) DEVICE — DRAPE 3/4 SHEET 52X76"

## (undated) DEVICE — DRSG COBAN 6"

## (undated) DEVICE — DRSG STERISTRIPS 0.5 X 4"

## (undated) DEVICE — DRAPE COVER SNAP 36X30"

## (undated) DEVICE — DRSG TELFA 3 X 8

## (undated) DEVICE — CANISTER DISPOSABLE THIN WALL 3000CC

## (undated) DEVICE — DRAPE TOWEL BLUE 17" X 24"

## (undated) DEVICE — DRSG STOCKINETTE IMPERVIOUS XL

## (undated) DEVICE — LABELS BLANK W PEN

## (undated) DEVICE — DRAPE U POLY BLUE 60"X60"